# Patient Record
Sex: MALE | Race: WHITE | NOT HISPANIC OR LATINO | URBAN - METROPOLITAN AREA
[De-identification: names, ages, dates, MRNs, and addresses within clinical notes are randomized per-mention and may not be internally consistent; named-entity substitution may affect disease eponyms.]

---

## 2017-05-09 ENCOUNTER — OFFICE (OUTPATIENT)
Dept: URBAN - METROPOLITAN AREA CLINIC 64 | Facility: CLINIC | Age: 62
End: 2017-05-09

## 2017-05-09 VITALS
HEART RATE: 61 BPM | WEIGHT: 171 LBS | HEIGHT: 68 IN | DIASTOLIC BLOOD PRESSURE: 84 MMHG | SYSTOLIC BLOOD PRESSURE: 133 MMHG

## 2017-05-09 DIAGNOSIS — K51.00 ULCERATIVE (CHRONIC) PANCOLITIS WITHOUT COMPLICATIONS: ICD-10-CM

## 2017-05-09 LAB
C-REACTIVE PROTEIN, QUANT: 0.6 MG/L (ref 0–4.9)
CBC WITH DIFFERENTIAL/PLATELET: BASO (ABSOLUTE): 0 X10E3/UL (ref 0–0.2)
CBC WITH DIFFERENTIAL/PLATELET: BASOS: 0 %
CBC WITH DIFFERENTIAL/PLATELET: EOS (ABSOLUTE): 0.2 X10E3/UL (ref 0–0.4)
CBC WITH DIFFERENTIAL/PLATELET: EOS: 3 %
CBC WITH DIFFERENTIAL/PLATELET: HEMATOCRIT: 48.9 % (ref 37.5–51)
CBC WITH DIFFERENTIAL/PLATELET: HEMATOLOGY COMMENTS: (no result)
CBC WITH DIFFERENTIAL/PLATELET: HEMOGLOBIN: 16.7 G/DL (ref 12.6–17.7)
CBC WITH DIFFERENTIAL/PLATELET: IMMATURE CELLS: (no result)
CBC WITH DIFFERENTIAL/PLATELET: IMMATURE GRANS (ABS): 0 X10E3/UL (ref 0–0.1)
CBC WITH DIFFERENTIAL/PLATELET: IMMATURE GRANULOCYTES: 0 %
CBC WITH DIFFERENTIAL/PLATELET: LYMPHS (ABSOLUTE): 1.2 X10E3/UL (ref 0.7–3.1)
CBC WITH DIFFERENTIAL/PLATELET: LYMPHS: 19 %
CBC WITH DIFFERENTIAL/PLATELET: MCH: 32.2 PG (ref 26.6–33)
CBC WITH DIFFERENTIAL/PLATELET: MCHC: 34.2 G/DL (ref 31.5–35.7)
CBC WITH DIFFERENTIAL/PLATELET: MCV: 94 FL (ref 79–97)
CBC WITH DIFFERENTIAL/PLATELET: MONOCYTES(ABSOLUTE): 0.6 X10E3/UL (ref 0.1–0.9)
CBC WITH DIFFERENTIAL/PLATELET: MONOCYTES: 10 %
CBC WITH DIFFERENTIAL/PLATELET: NEUTROPHILS (ABSOLUTE): 4.2 X10E3/UL (ref 1.4–7)
CBC WITH DIFFERENTIAL/PLATELET: NEUTROPHILS: 68 %
CBC WITH DIFFERENTIAL/PLATELET: NRBC: (no result)
CBC WITH DIFFERENTIAL/PLATELET: PLATELETS: 212 X10E3/UL (ref 150–379)
CBC WITH DIFFERENTIAL/PLATELET: RBC: 5.19 X10E6/UL (ref 4.14–5.8)
CBC WITH DIFFERENTIAL/PLATELET: RDW: 13.5 % (ref 12.3–15.4)
CBC WITH DIFFERENTIAL/PLATELET: WBC: 6.2 X10E3/UL (ref 3.4–10.8)
COMP. METABOLIC PANEL (14): A/G RATIO: 1.7 (ref 1.2–2.2)
COMP. METABOLIC PANEL (14): ALBUMIN, SERUM: 4.5 G/DL (ref 3.6–4.8)
COMP. METABOLIC PANEL (14): ALKALINE PHOSPHATASE, S: 67 IU/L (ref 39–117)
COMP. METABOLIC PANEL (14): ALT (SGPT): 25 IU/L (ref 0–44)
COMP. METABOLIC PANEL (14): AST (SGOT): 29 IU/L (ref 0–40)
COMP. METABOLIC PANEL (14): BILIRUBIN, TOTAL: 0.5 MG/DL (ref 0–1.2)
COMP. METABOLIC PANEL (14): BUN/CREATININE RATIO: 21 (ref 10–24)
COMP. METABOLIC PANEL (14): BUN: 18 MG/DL (ref 8–27)
COMP. METABOLIC PANEL (14): CALCIUM, SERUM: 9.3 MG/DL (ref 8.6–10.2)
COMP. METABOLIC PANEL (14): CARBON DIOXIDE, TOTAL: 20 MMOL/L (ref 18–29)
COMP. METABOLIC PANEL (14): CHLORIDE, SERUM: 101 MMOL/L (ref 96–106)
COMP. METABOLIC PANEL (14): CREATININE, SERUM: 0.85 MG/DL (ref 0.76–1.27)
COMP. METABOLIC PANEL (14): EGFR IF AFRICN AM: 109 ML/MIN/1.73 (ref 59–?)
COMP. METABOLIC PANEL (14): EGFR IF NONAFRICN AM: 94 ML/MIN/1.73 (ref 59–?)
COMP. METABOLIC PANEL (14): GLOBULIN, TOTAL: 2.7 G/DL (ref 1.5–4.5)
COMP. METABOLIC PANEL (14): GLUCOSE, SERUM: 91 MG/DL (ref 65–99)
COMP. METABOLIC PANEL (14): POTASSIUM, SERUM: 4.3 MMOL/L (ref 3.5–5.2)
COMP. METABOLIC PANEL (14): PROTEIN, TOTAL, SERUM: 7.2 G/DL (ref 6–8.5)
COMP. METABOLIC PANEL (14): SODIUM, SERUM: 140 MMOL/L (ref 134–144)
SEDIMENTATION RATE-WESTERGREN: 6 MM/HR (ref 0–30)

## 2017-05-09 PROCEDURE — 99213 OFFICE O/P EST LOW 20 MIN: CPT | Performed by: NURSE PRACTITIONER

## 2017-05-09 RX ORDER — MESALAMINE 1.2 G/1
TABLET, DELAYED RELEASE ORAL
Qty: 360 | Refills: 3 | Status: ACTIVE

## 2017-05-09 RX ORDER — PREDNISONE 10 MG/1
TABLET ORAL
Qty: 100 | Refills: 1 | Status: COMPLETED
Start: 2017-05-09 | End: 2017-07-14

## 2017-07-14 ENCOUNTER — OFFICE (OUTPATIENT)
Dept: URBAN - METROPOLITAN AREA CLINIC 64 | Facility: CLINIC | Age: 62
End: 2017-07-14

## 2017-07-14 VITALS
SYSTOLIC BLOOD PRESSURE: 131 MMHG | HEIGHT: 68 IN | WEIGHT: 171 LBS | DIASTOLIC BLOOD PRESSURE: 81 MMHG | HEART RATE: 62 BPM

## 2017-07-14 DIAGNOSIS — K51.00 ULCERATIVE (CHRONIC) PANCOLITIS WITHOUT COMPLICATIONS: ICD-10-CM

## 2017-07-14 DIAGNOSIS — K62.5 HEMORRHAGE OF ANUS AND RECTUM: ICD-10-CM

## 2017-07-14 DIAGNOSIS — R19.7 DIARRHEA, UNSPECIFIED: ICD-10-CM

## 2017-07-14 PROCEDURE — 99213 OFFICE O/P EST LOW 20 MIN: CPT | Performed by: INTERNAL MEDICINE

## 2018-02-05 ENCOUNTER — OFFICE (OUTPATIENT)
Dept: URBAN - METROPOLITAN AREA CLINIC 64 | Facility: CLINIC | Age: 63
End: 2018-02-05

## 2018-02-05 VITALS
DIASTOLIC BLOOD PRESSURE: 68 MMHG | WEIGHT: 170 LBS | HEART RATE: 40 BPM | SYSTOLIC BLOOD PRESSURE: 128 MMHG | HEIGHT: 68 IN

## 2018-02-05 DIAGNOSIS — K51.00 ULCERATIVE (CHRONIC) PANCOLITIS WITHOUT COMPLICATIONS: ICD-10-CM

## 2018-02-05 PROCEDURE — 99213 OFFICE O/P EST LOW 20 MIN: CPT | Performed by: INTERNAL MEDICINE

## 2018-02-05 RX ORDER — BUDESONIDE 3 MG/1
9 CAPSULE ORAL
Qty: 90 | Refills: 1 | Status: COMPLETED
Start: 2018-01-26 | End: 2018-05-07

## 2018-05-07 ENCOUNTER — OFFICE (OUTPATIENT)
Dept: URBAN - METROPOLITAN AREA CLINIC 64 | Facility: CLINIC | Age: 63
End: 2018-05-07

## 2018-05-07 VITALS
SYSTOLIC BLOOD PRESSURE: 129 MMHG | WEIGHT: 165 LBS | HEART RATE: 59 BPM | HEIGHT: 68 IN | DIASTOLIC BLOOD PRESSURE: 73 MMHG

## 2018-05-07 DIAGNOSIS — K51.00 ULCERATIVE (CHRONIC) PANCOLITIS WITHOUT COMPLICATIONS: ICD-10-CM

## 2018-05-07 PROCEDURE — 99213 OFFICE O/P EST LOW 20 MIN: CPT | Performed by: INTERNAL MEDICINE

## 2018-10-29 ENCOUNTER — OFFICE (OUTPATIENT)
Dept: URBAN - METROPOLITAN AREA CLINIC 64 | Facility: CLINIC | Age: 63
End: 2018-10-29

## 2018-10-29 VITALS
HEART RATE: 95 BPM | DIASTOLIC BLOOD PRESSURE: 79 MMHG | SYSTOLIC BLOOD PRESSURE: 120 MMHG | WEIGHT: 152 LBS | HEIGHT: 68 IN

## 2018-10-29 DIAGNOSIS — R53.83 OTHER FATIGUE: ICD-10-CM

## 2018-10-29 DIAGNOSIS — K62.5 HEMORRHAGE OF ANUS AND RECTUM: ICD-10-CM

## 2018-10-29 DIAGNOSIS — R10.30 LOWER ABDOMINAL PAIN, UNSPECIFIED: ICD-10-CM

## 2018-10-29 DIAGNOSIS — R19.7 DIARRHEA, UNSPECIFIED: ICD-10-CM

## 2018-10-29 DIAGNOSIS — K51.30 ULCERATIVE (CHRONIC) RECTOSIGMOIDITIS WITHOUT COMPLICATIONS: ICD-10-CM

## 2018-10-29 DIAGNOSIS — R11.0 NAUSEA: ICD-10-CM

## 2018-10-29 PROCEDURE — 99214 OFFICE O/P EST MOD 30 MIN: CPT | Performed by: NURSE PRACTITIONER

## 2018-10-29 RX ORDER — DICYCLOMINE HYDROCHLORIDE 20 MG/1
40 TABLET ORAL
Qty: 60 | Refills: 11 | Status: COMPLETED
Start: 2018-10-29 | End: 2019-02-08

## 2018-10-29 RX ORDER — METRONIDAZOLE 500 MG/1
1500 TABLET, FILM COATED ORAL
Qty: 42 | Refills: 0 | Status: COMPLETED
Start: 2018-10-29 | End: 2018-11-29

## 2018-10-29 RX ORDER — ONDANSETRON HYDROCHLORIDE 4 MG/1
16 TABLET, ORALLY DISINTEGRATING ORAL
Qty: 60 | Refills: 0 | Status: COMPLETED
Start: 2018-10-29 | End: 2018-11-29

## 2018-11-29 ENCOUNTER — OFFICE (OUTPATIENT)
Dept: URBAN - METROPOLITAN AREA CLINIC 64 | Facility: CLINIC | Age: 63
End: 2018-11-29

## 2018-11-29 VITALS
HEART RATE: 97 BPM | WEIGHT: 141 LBS | HEIGHT: 68 IN | DIASTOLIC BLOOD PRESSURE: 76 MMHG | SYSTOLIC BLOOD PRESSURE: 117 MMHG

## 2018-11-29 DIAGNOSIS — R19.7 DIARRHEA, UNSPECIFIED: ICD-10-CM

## 2018-11-29 DIAGNOSIS — K51.30 ULCERATIVE (CHRONIC) RECTOSIGMOIDITIS WITHOUT COMPLICATIONS: ICD-10-CM

## 2018-11-29 DIAGNOSIS — A04.72 ENTEROCOLITIS DUE TO CLOSTRIDIUM DIFFICILE, NOT SPECIFIED AS: ICD-10-CM

## 2018-11-29 PROCEDURE — 99213 OFFICE O/P EST LOW 20 MIN: CPT | Performed by: INTERNAL MEDICINE

## 2018-11-29 RX ORDER — SACCHAROMYCES BOULARDII 50 MG
500 CAPSULE ORAL
Qty: 60 | Refills: 1 | Status: COMPLETED
Start: 2018-11-29 | End: 2019-02-08

## 2018-12-17 ENCOUNTER — CONVERSION ENCOUNTER (OUTPATIENT)
Dept: FAMILY MEDICINE CLINIC | Facility: CLINIC | Age: 63
End: 2018-12-17

## 2018-12-18 LAB
ALBUMIN SERPL-MCNC: 3.7 G/DL (ref 3.6–5.1)
ALP SERPL-CCNC: 72 U/L (ref 40–115)
ALT SERPL-CCNC: 16 U/L (ref 9–46)
AST SERPL-CCNC: 18 U/L (ref 10–35)
BASOPHILS # BLD AUTO: 40 CELLS/UL (ref 0–200)
BASOPHILS NFR BLD AUTO: 0.7 %
BILIRUB SERPL-MCNC: 0.4 MG/DL (ref 0.2–1.2)
BUN SERPL-MCNC: 12 MG/DL (ref 7–25)
BUN/CREAT SERPL: NORMAL (CALC) (ref 6–22)
CALCIUM SERPL-MCNC: 9.1 MG/DL (ref 8.6–10.3)
CHLORIDE SERPL-SCNC: 105 MMOL/L (ref 98–110)
CHOLEST SERPL-MCNC: 229 MG/DL
CHOLEST/HDLC SERPL: 5.6 (CALC)
CONV CO2: 31 MMOL/L (ref 20–32)
CONV TOTAL PROTEIN: 6.5 G/DL (ref 6.1–8.1)
CREAT UR-MCNC: 0.78 MG/DL (ref 0.7–1.25)
EOSINOPHIL # BLD AUTO: 262 CELLS/UL (ref 15–500)
EOSINOPHIL # BLD AUTO: 4.6 %
ERYTHROCYTE [DISTWIDTH] IN BLOOD BY AUTOMATED COUNT: 15.1 % (ref 11–15)
GLOBULIN UR ELPH-MCNC: 2.8 MG/DL (ref 1.9–3.7)
GLUCOSE UR QL: 75 MG/DL (ref 65–99)
HCT VFR BLD AUTO: 42.7 % (ref 38.5–50)
HDLC SERPL-MCNC: 41 MG/DL
HGB BLD-MCNC: 14.4 G/DL (ref 13.2–17.1)
INSULIN SERPL-ACNC: 1.3 (CALC) (ref 1–2.5)
LDLC SERPL CALC-MCNC: 166 MG/DL
LYMPHOCYTES # BLD AUTO: 906 CELLS/UL (ref 850–3900)
LYMPHOCYTES NFR BLD AUTO: 15.9 %
MCH RBC QN AUTO: 32 PG (ref 27–33)
MCHC RBC AUTO-ENTMCNC: 33.7 G/DL (ref 32–36)
MCV RBC AUTO: 94.9 FL (ref 80–100)
MONOCYTES # BLD AUTO: 462 CELLS/UL (ref 200–950)
MONOCYTES NFR BLD AUTO: 8.1 %
NEUTROPHILS # BLD AUTO: 4030 CELLS/UL (ref 1500–7800)
NEUTROPHILS NFR BLD AUTO: 70.7 %
NONHDLC SERPL-MCNC: 188 MG/DL
PLATELET # BLD AUTO: 248 10*3/UL (ref 140–400)
PMV BLD AUTO: 10.4 FL (ref 7.5–12.5)
POTASSIUM SERPL-SCNC: 4.5 MMOL/L (ref 3.5–5.3)
PSA SERPL-MCNC: 2.6 NG/ML
RBC # BLD AUTO: 4.5 MILLION/UL (ref 4.2–5.8)
SODIUM SERPL-SCNC: 141 MMOL/L (ref 135–146)
TRIGL SERPL-MCNC: 106 MG/DL
TSH SERPL-ACNC: 2.38 MIU/L (ref 0.4–4.5)
WBC # BLD AUTO: 5.7 10*3/UL (ref 3.8–10.8)

## 2019-02-08 ENCOUNTER — OFFICE (OUTPATIENT)
Dept: URBAN - METROPOLITAN AREA CLINIC 64 | Facility: CLINIC | Age: 64
End: 2019-02-08

## 2019-02-08 VITALS
WEIGHT: 161 LBS | SYSTOLIC BLOOD PRESSURE: 130 MMHG | DIASTOLIC BLOOD PRESSURE: 65 MMHG | HEART RATE: 39 BPM | HEIGHT: 68 IN

## 2019-02-08 DIAGNOSIS — K51.00 ULCERATIVE (CHRONIC) PANCOLITIS WITHOUT COMPLICATIONS: ICD-10-CM

## 2019-02-08 PROCEDURE — 99213 OFFICE O/P EST LOW 20 MIN: CPT | Performed by: INTERNAL MEDICINE

## 2019-04-03 ENCOUNTER — ON CAMPUS - OUTPATIENT (OUTPATIENT)
Dept: RURAL HOSPITAL 3 | Facility: HOSPITAL | Age: 64
End: 2019-04-03

## 2019-04-03 DIAGNOSIS — K51.90 ULCERATIVE COLITIS, UNSPECIFIED, WITHOUT COMPLICATIONS: ICD-10-CM

## 2019-04-03 DIAGNOSIS — K63.5 POLYP OF COLON: ICD-10-CM

## 2019-04-03 PROCEDURE — 45380 COLONOSCOPY AND BIOPSY: CPT | Mod: 33 | Performed by: INTERNAL MEDICINE

## 2019-08-01 ENCOUNTER — OFFICE (OUTPATIENT)
Dept: URBAN - METROPOLITAN AREA CLINIC 64 | Facility: CLINIC | Age: 64
End: 2019-08-01

## 2019-08-01 VITALS
HEIGHT: 68 IN | DIASTOLIC BLOOD PRESSURE: 85 MMHG | SYSTOLIC BLOOD PRESSURE: 131 MMHG | WEIGHT: 161 LBS | HEART RATE: 60 BPM

## 2019-08-01 DIAGNOSIS — K51.00 ULCERATIVE (CHRONIC) PANCOLITIS WITHOUT COMPLICATIONS: ICD-10-CM

## 2019-08-01 PROCEDURE — 99213 OFFICE O/P EST LOW 20 MIN: CPT | Performed by: INTERNAL MEDICINE

## 2019-12-18 PROBLEM — E66.3 OVERWEIGHT (BMI 25.0-29.9): Status: ACTIVE | Noted: 2019-12-18

## 2019-12-18 PROBLEM — E78.5 HYPERLIPIDEMIA: Status: ACTIVE | Noted: 2019-12-18

## 2019-12-18 PROBLEM — A04.72 C. DIFFICILE DIARRHEA: Status: ACTIVE | Noted: 2019-12-18

## 2019-12-18 PROBLEM — Z00.01 ANNUAL VISIT FOR GENERAL ADULT MEDICAL EXAMINATION WITH ABNORMAL FINDINGS: Status: ACTIVE | Noted: 2019-12-18

## 2019-12-18 PROBLEM — K51.20 CHRONIC ULCERATIVE PROCTITIS WITHOUT COMPLICATIONS (HCC): Status: ACTIVE | Noted: 2019-12-18

## 2019-12-18 PROBLEM — N41.0 PROSTATITIS, ACUTE: Status: ACTIVE | Noted: 2019-12-18

## 2019-12-18 PROBLEM — N52.9 ED (ERECTILE DYSFUNCTION): Status: ACTIVE | Noted: 2019-12-18

## 2019-12-18 PROBLEM — M15.9 GENERALIZED OSTEOARTHRITIS: Status: ACTIVE | Noted: 2019-12-18

## 2019-12-18 PROBLEM — Z12.11 COLON CANCER SCREENING: Status: ACTIVE | Noted: 2019-12-18

## 2019-12-18 PROBLEM — Z87.891 HISTORY OF TOBACCO USE: Status: ACTIVE | Noted: 2019-12-18

## 2019-12-18 PROBLEM — B35.1 ONYCHOMYCOSIS: Status: ACTIVE | Noted: 2019-12-18

## 2019-12-18 PROBLEM — Z12.5 SCREENING PSA (PROSTATE SPECIFIC ANTIGEN): Status: ACTIVE | Noted: 2019-12-18

## 2019-12-18 RX ORDER — MULTIVITAMIN/IRON/FOLIC ACID 18MG-0.4MG
1 TABLET ORAL DAILY
Status: ON HOLD | COMMUNITY
End: 2021-01-01

## 2019-12-18 RX ORDER — UREA 10 %
800 LOTION (ML) TOPICAL DAILY
COMMUNITY

## 2019-12-18 RX ORDER — VARDENAFIL HYDROCHLORIDE 20 MG/1
TABLET ORAL
COMMUNITY
Start: 2019-02-24 | End: 2020-06-10 | Stop reason: SDUPTHER

## 2019-12-18 RX ORDER — CRANBERRY FRUIT EXTRACT 200 MG
2 CAPSULE ORAL DAILY
COMMUNITY
End: 2021-01-01 | Stop reason: HOSPADM

## 2019-12-18 RX ORDER — DICYCLOMINE HCL 20 MG
TABLET ORAL
COMMUNITY
End: 2020-01-01

## 2019-12-18 RX ORDER — MESALAMINE 1.2 G/1
4800 TABLET, DELAYED RELEASE ORAL DAILY
COMMUNITY

## 2019-12-18 RX ORDER — BUDESONIDE 3 MG/1
1 CAPSULE, COATED PELLETS ORAL 3 TIMES DAILY
COMMUNITY
End: 2020-01-01

## 2019-12-18 NOTE — PROGRESS NOTES
Subjective   Gabriel Sandhu is a 64 y.o. male.     Chief Complaint   Patient presents with   • Annual Exam   • Hyperlipidemia       The patient is here: to discuss health maintenance and disease prevention to follow up on multiple medical problems.  Last comprehensive physical was on 12/19/2018.  Previous physical was performed by  Jose D Antunez MD  Overall has: moderate activity with work/home activities, exercises 2 - 3 times per week, good appetite, feels well with minor complaints, good energy level and is sleeping well. Nutrition: balanced diet and eating a variety of foods. Last tetanus shot was 11/12/2013. Patient's last colonoscopy was: 4/3/2019. Patient's last PSA was: 12/18/2019 2.5    Hyperlipidemia   This is a chronic problem. The current episode started more than 1 year ago. Recent lipid tests were reviewed and are high. Pertinent negatives include no chest pain or shortness of breath. Current antihyperlipidemic treatment includes diet change and exercise. There are no compliance problems.  Risk factors for coronary artery disease include dyslipidemia and male sex.        Recent Hospitalizations:  Recently treated at the following:  [facility]  for diarrhea 11/11/2019.  ccc      I personally reviewed and updated the patient's allergies, medications, problem list, and past medical, surgical, social, and family history.     Family History   Problem Relation Age of Onset   • Alzheimer's disease Father    • Dementia Father        Social History     Tobacco Use   • Smoking status: Former Smoker   • Smokeless tobacco: Never Used   Substance Use Topics   • Alcohol use: Yes     Frequency: Monthly or less     Comment: Drinks wine, Occasional alcohol use.   • Drug use: No       History reviewed. No pertinent surgical history.    Patient Active Problem List   Diagnosis   • Annual visit for general adult medical examination with abnormal findings   • Colon cancer screening   • Screening PSA (prostate  "specific antigen)   • C. 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   HENT: Negative for trouble swallowing and voice change.    Eyes: Negative for visual disturbance.   Respiratory: Negative for shortness of breath.    Cardiovascular: Negative for chest pain and palpitations.   Gastrointestinal: Negative for abdominal pain and nausea.   Endocrine: Negative for polydipsia and polyphagia.   Genitourinary: Negative for hematuria.   Musculoskeletal: Negative for neck stiffness.   Skin: Negative for color change and pallor.   Allergic/Immunologic: Negative for immunocompromised state.   Neurological: Negative for seizures and syncope.   Hematological: Negative for adenopathy.   Psychiatric/Behavioral: Negative for sleep disturbance and suicidal ideas.       Objective   /74   Pulse 98   Temp 98.5 °F (36.9 °C)   Resp 16   Ht 172.7 cm (68\")   Wt 77.2 kg (170 lb 3.2 oz)   SpO2 96%   BMI 25.88 kg/m²   Wt Readings from Last 3 Encounters:   12/20/19 77.2 kg (170 lb 3.2 oz)   12/19/18 67.5 kg (148 lb 12.8 oz)   11/19/18 64.7 kg (142 lb 9.6 oz)     Physical Exam   Constitutional: He is oriented to person, place, and time. He appears well-developed and well-nourished.   HENT:   Head: Normocephalic.   Right Ear: Tympanic membrane, external ear and ear canal normal.   Left Ear: Tympanic membrane, external ear and ear canal normal.   Nose: Nose normal.   Eyes: Pupils are equal, round, and reactive to light. Conjunctivae, EOM and lids are normal.   Neck: No JVD present. Carotid bruit is not present. No tracheal deviation present. No thyromegaly present.   Cardiovascular: Normal rate, regular rhythm, normal heart sounds and " intact distal pulses. Exam reveals no gallop and no friction rub.   No murmur heard.  Pulmonary/Chest: Effort normal and breath sounds normal. No stridor. He has no decreased breath sounds. He has no wheezes. He has no rales.   Abdominal: Soft. Bowel sounds are normal. He exhibits no distension and no mass. There is no tenderness. There is no rebound and no guarding. No hernia. Hernia confirmed negative in the right inguinal area and confirmed negative in the left inguinal area.   Genitourinary: Rectum normal, testes normal and penis normal. Rectal exam shows no mass, no tenderness and guaiac negative stool. Prostate is enlarged (mild, symmetric, no nodules). Prostate is not tender. Right testis shows no mass, no swelling and no tenderness. Left testis shows no mass, no swelling and no tenderness.   Lymphadenopathy:        Head (right side): No submental, no submandibular, no tonsillar, no preauricular, no posterior auricular and no occipital adenopathy present.        Head (left side): No submental, no submandibular, no tonsillar, no preauricular, no posterior auricular and no occipital adenopathy present.     He has no cervical adenopathy. No inguinal adenopathy noted on the right or left side.   Neurological: He is alert and oriented to person, place, and time. He has normal strength and normal reflexes. No cranial nerve deficit or sensory deficit. Coordination and gait normal.   Skin: Skin is warm and dry. Turgor is normal. He is not diaphoretic. No pallor.       Recent Lab Results:    Lab Results   Component Value Date    GLU 93 12/18/2019        Lab Results   Component Value Date    CHOL 229 (H) 12/17/2018    TRIG 139 12/18/2019    HDL 39 (L) 12/18/2019    VLDL 28 12/18/2019     LDL Cholesterol    Date Value Ref Range Status   12/18/2019 177 (H) 0 - 99 mg/dL Final   12/17/2018 166 (H) NR Final   12/15/2017 183 (H) <130 mg/dL Final   11/26/2016 146 (H) <130 Final     Lab Results   Component Value Date    PSA  2.5 12/18/2019    PSA 2.6 12/17/2018    PSA 1.4 12/15/2017     Lab Results   Component Value Date    WBC 5.5 12/18/2019    HGB 16.5 12/18/2019    HCT 47.8 12/18/2019    MCV 98 (H) 12/18/2019     12/18/2019     Lab Results   Component Value Date    TSH 2.620 12/18/2019     Lab Results   Component Value Date    GLUCOSE 116 (H) 11/11/2018    BUN 13 12/18/2019    CREATININE 1.13 12/18/2019    EGFRIFNONA 68 12/18/2019    EGFRIFAFRI 79 12/18/2019    BCR 12 12/18/2019    K 4.6 12/18/2019    CO2 26 12/18/2019    CALCIUM 9.7 12/18/2019    PROTENTOTREF 6.8 12/18/2019    ALBUMIN 4.7 12/18/2019    LABIL2 2.2 12/18/2019    AST 26 12/18/2019    ALT 21 12/18/2019         Age-appropriate Screening Schedule:  Refer to the list below for future screening recommendations based on patient's age, sex and/or medical conditions. Orders for these recommended tests are listed in the plan section. The patient has been provided with a written plan.    Health Maintenance   Topic Date Due   • ZOSTER VACCINE (1 of 2) 03/29/2013   • INFLUENZA VACCINE  08/01/2019   • LIPID PANEL  12/18/2020   • TDAP/TD VACCINES (2 - Td) 11/12/2023   • COLONOSCOPY  04/03/2029           Assessment/Plan       Physical.  Doing well, vaccines current.  He does not tolerate baby aspirin.  Discussed health maintenance, screening test, and lifestyle modification.  Hyperlipidemia.  Moderate elevation, overall improved on red yeast rice 2400 mg daily.  He is working hard on diet and exercise.  Recheck 1 year.  Colon cancer screening.  Colonoscopy current, followed by [facility].  Prostate cancer screening.  PSA/GABRIELLE benign.  Crohn's disease.  Overall stable on Rx per [facility].  Status post recent flare/C. difficile colitis.    Problem List Items Addressed This Visit        Cardiovascular and Mediastinum    Hyperlipidemia       Other    Annual visit for general adult medical examination with abnormal findings - Primary    Colon cancer screening    Screening PSA (prostate specific  antigen)    History of tobacco use    Overweight (BMI 25.0-29.9)              Expected course, medications, and adverse effects discussed.  Call or return if worsening or persistent symptoms.

## 2019-12-20 ENCOUNTER — OFFICE VISIT (OUTPATIENT)
Dept: FAMILY MEDICINE CLINIC | Facility: CLINIC | Age: 64
End: 2019-12-20

## 2019-12-20 VITALS
WEIGHT: 170.2 LBS | SYSTOLIC BLOOD PRESSURE: 132 MMHG | TEMPERATURE: 98.5 F | BODY MASS INDEX: 25.79 KG/M2 | OXYGEN SATURATION: 96 % | RESPIRATION RATE: 16 BRPM | HEART RATE: 98 BPM | DIASTOLIC BLOOD PRESSURE: 74 MMHG | HEIGHT: 68 IN

## 2019-12-20 DIAGNOSIS — Z12.5 SCREENING PSA (PROSTATE SPECIFIC ANTIGEN): ICD-10-CM

## 2019-12-20 DIAGNOSIS — Z12.11 COLON CANCER SCREENING: ICD-10-CM

## 2019-12-20 DIAGNOSIS — E78.2 MIXED HYPERLIPIDEMIA: ICD-10-CM

## 2019-12-20 DIAGNOSIS — Z00.01 ANNUAL VISIT FOR GENERAL ADULT MEDICAL EXAMINATION WITH ABNORMAL FINDINGS: Primary | ICD-10-CM

## 2019-12-20 DIAGNOSIS — Z87.891 HISTORY OF TOBACCO USE: ICD-10-CM

## 2019-12-20 DIAGNOSIS — E66.3 OVERWEIGHT (BMI 25.0-29.9): ICD-10-CM

## 2019-12-20 LAB
BILIRUB BLD-MCNC: NEGATIVE MG/DL
CLARITY, POC: CLEAR
COLOR UR: YELLOW
GLUCOSE UR STRIP-MCNC: NEGATIVE MG/DL
KETONES UR QL: NEGATIVE
LEUKOCYTE EST, POC: NEGATIVE
NITRITE UR-MCNC: NEGATIVE MG/ML
PH UR: 5 [PH] (ref 5–8)
PROT UR STRIP-MCNC: ABNORMAL MG/DL
RBC # UR STRIP: ABNORMAL /UL
SP GR UR: 1.01 (ref 1–1.03)
UROBILINOGEN UR QL: NORMAL

## 2019-12-20 PROCEDURE — 81003 URINALYSIS AUTO W/O SCOPE: CPT | Performed by: FAMILY MEDICINE

## 2019-12-20 PROCEDURE — 99396 PREV VISIT EST AGE 40-64: CPT | Performed by: FAMILY MEDICINE

## 2019-12-20 RX ORDER — AZATHIOPRINE 50 MG/1
75 TABLET ORAL DAILY
COMMUNITY
Start: 2019-11-12 | End: 2021-01-01 | Stop reason: HOSPADM

## 2020-01-01 ENCOUNTER — TELEPHONE (OUTPATIENT)
Dept: FAMILY MEDICINE CLINIC | Facility: CLINIC | Age: 65
End: 2020-01-01

## 2020-01-01 ENCOUNTER — OFFICE VISIT (OUTPATIENT)
Dept: FAMILY MEDICINE CLINIC | Facility: CLINIC | Age: 65
End: 2020-01-01

## 2020-01-01 VITALS
BODY MASS INDEX: 25.7 KG/M2 | WEIGHT: 169.6 LBS | OXYGEN SATURATION: 98 % | RESPIRATION RATE: 18 BRPM | HEIGHT: 68 IN | SYSTOLIC BLOOD PRESSURE: 129 MMHG | HEART RATE: 97 BPM | TEMPERATURE: 97.7 F | DIASTOLIC BLOOD PRESSURE: 84 MMHG

## 2020-01-01 DIAGNOSIS — Z00.00 WELCOME TO MEDICARE PREVENTIVE VISIT: Primary | ICD-10-CM

## 2020-01-01 DIAGNOSIS — R31.9 HEMATURIA, UNSPECIFIED TYPE: ICD-10-CM

## 2020-01-01 DIAGNOSIS — E66.3 OVERWEIGHT (BMI 25.0-29.9): ICD-10-CM

## 2020-01-01 DIAGNOSIS — Z87.891 HISTORY OF TOBACCO ABUSE: ICD-10-CM

## 2020-01-01 DIAGNOSIS — R07.9 CHEST PAIN, UNSPECIFIED TYPE: ICD-10-CM

## 2020-01-01 DIAGNOSIS — N52.9 ERECTILE DYSFUNCTION, UNSPECIFIED ERECTILE DYSFUNCTION TYPE: ICD-10-CM

## 2020-01-01 DIAGNOSIS — Z12.11 COLON CANCER SCREENING: ICD-10-CM

## 2020-01-01 DIAGNOSIS — Z23 NEED FOR PNEUMOCOCCAL VACCINE: ICD-10-CM

## 2020-01-01 DIAGNOSIS — Z00.00 WELCOME TO MEDICARE PREVENTIVE VISIT: ICD-10-CM

## 2020-01-01 DIAGNOSIS — E78.2 MIXED HYPERLIPIDEMIA: ICD-10-CM

## 2020-01-01 DIAGNOSIS — Z12.5 SCREENING PSA (PROSTATE SPECIFIC ANTIGEN): ICD-10-CM

## 2020-01-01 LAB
BACTERIA UR CULT: NO GROWTH
BACTERIA UR CULT: NORMAL
BILIRUB BLD-MCNC: NEGATIVE MG/DL
CLARITY, POC: CLEAR
COLOR UR: YELLOW
GLUCOSE UR STRIP-MCNC: NEGATIVE MG/DL
KETONES UR QL: NEGATIVE
LEUKOCYTE EST, POC: NEGATIVE
NITRITE UR-MCNC: NEGATIVE MG/ML
PH UR: 6.5 [PH] (ref 5–8)
PROT UR STRIP-MCNC: ABNORMAL MG/DL
PSA SERPL-MCNC: 2.6 NG/ML (ref 0–4)
RBC # UR STRIP: ABNORMAL /UL
SP GR UR: 1.01 (ref 1–1.03)
UROBILINOGEN UR QL: NORMAL

## 2020-01-01 PROCEDURE — 81003 URINALYSIS AUTO W/O SCOPE: CPT | Performed by: FAMILY MEDICINE

## 2020-01-01 PROCEDURE — G0009 ADMIN PNEUMOCOCCAL VACCINE: HCPCS | Performed by: FAMILY MEDICINE

## 2020-01-01 PROCEDURE — G0402 INITIAL PREVENTIVE EXAM: HCPCS | Performed by: FAMILY MEDICINE

## 2020-01-01 PROCEDURE — 90670 PCV13 VACCINE IM: CPT | Performed by: FAMILY MEDICINE

## 2020-01-01 PROCEDURE — 99213 OFFICE O/P EST LOW 20 MIN: CPT | Performed by: FAMILY MEDICINE

## 2020-01-01 PROCEDURE — 99497 ADVNCD CARE PLAN 30 MIN: CPT | Performed by: FAMILY MEDICINE

## 2020-01-01 RX ORDER — VARDENAFIL HYDROCHLORIDE 20 MG/1
20 TABLET ORAL DAILY PRN
Qty: 6 TABLET | Refills: 12 | Status: ON HOLD | OUTPATIENT
Start: 2020-01-01 | End: 2021-01-01 | Stop reason: SDUPTHER

## 2020-02-13 ENCOUNTER — OFFICE (OUTPATIENT)
Dept: URBAN - METROPOLITAN AREA CLINIC 64 | Facility: CLINIC | Age: 65
End: 2020-02-13

## 2020-02-13 VITALS
SYSTOLIC BLOOD PRESSURE: 127 MMHG | HEIGHT: 68 IN | DIASTOLIC BLOOD PRESSURE: 83 MMHG | HEART RATE: 67 BPM | WEIGHT: 173 LBS

## 2020-02-13 DIAGNOSIS — K51.00 ULCERATIVE (CHRONIC) PANCOLITIS WITHOUT COMPLICATIONS: ICD-10-CM

## 2020-02-13 DIAGNOSIS — R19.5 OTHER FECAL ABNORMALITIES: ICD-10-CM

## 2020-02-13 PROCEDURE — 99213 OFFICE O/P EST LOW 20 MIN: CPT | Performed by: INTERNAL MEDICINE

## 2020-02-13 RX ORDER — MESALAMINE 1.2 G/1
TABLET, DELAYED RELEASE ORAL
Qty: 360 | Refills: 3 | Status: ACTIVE

## 2020-02-13 RX ORDER — AZATHIOPRINE 50 MG/1
75 TABLET ORAL
Qty: 135 | Refills: 3 | Status: ACTIVE
Start: 2019-04-03

## 2020-06-10 ENCOUNTER — TELEPHONE (OUTPATIENT)
Dept: FAMILY MEDICINE CLINIC | Facility: CLINIC | Age: 65
End: 2020-06-10

## 2020-06-10 DIAGNOSIS — N52.9 ERECTILE DYSFUNCTION, UNSPECIFIED ERECTILE DYSFUNCTION TYPE: Primary | ICD-10-CM

## 2020-06-10 RX ORDER — VARDENAFIL HYDROCHLORIDE 20 MG/1
20 TABLET ORAL DAILY PRN
Qty: 6 TABLET | Refills: 2 | Status: SHIPPED | OUTPATIENT
Start: 2020-06-10 | End: 2020-07-27

## 2020-06-10 NOTE — TELEPHONE ENCOUNTER
Patient changed insurance company. Needs rx for Vardenafil 20mg sent to [facility]. He will want all future meds to also be sent to [facility] please.

## 2020-07-26 DIAGNOSIS — N52.9 ERECTILE DYSFUNCTION, UNSPECIFIED ERECTILE DYSFUNCTION TYPE: ICD-10-CM

## 2020-07-27 RX ORDER — VARDENAFIL HYDROCHLORIDE 20 MG/1
TABLET ORAL
Qty: 6 TABLET | Refills: 0 | Status: ON HOLD | OUTPATIENT
Start: 2020-07-27 | End: 2021-01-01

## 2020-08-27 ENCOUNTER — OFFICE (OUTPATIENT)
Dept: URBAN - METROPOLITAN AREA CLINIC 64 | Facility: CLINIC | Age: 65
End: 2020-08-27

## 2020-08-27 VITALS
HEART RATE: 69 BPM | SYSTOLIC BLOOD PRESSURE: 139 MMHG | DIASTOLIC BLOOD PRESSURE: 87 MMHG | WEIGHT: 170 LBS | HEIGHT: 68 IN

## 2020-08-27 DIAGNOSIS — K51.00 ULCERATIVE (CHRONIC) PANCOLITIS WITHOUT COMPLICATIONS: ICD-10-CM

## 2020-08-27 PROCEDURE — 99212 OFFICE O/P EST SF 10 MIN: CPT | Performed by: INTERNAL MEDICINE

## 2020-11-02 ENCOUNTER — OFFICE VISIT (OUTPATIENT)
Dept: FAMILY MEDICINE CLINIC | Facility: CLINIC | Age: 65
End: 2020-11-02

## 2020-11-02 ENCOUNTER — HOSPITAL ENCOUNTER (OUTPATIENT)
Dept: GENERAL RADIOLOGY | Facility: HOSPITAL | Age: 65
Discharge: HOME | End: 2020-11-02
Admitting: FAMILY MEDICINE

## 2020-11-02 VITALS
WEIGHT: 170.2 LBS | BODY MASS INDEX: 25.79 KG/M2 | RESPIRATION RATE: 16 BRPM | HEART RATE: 84 BPM | OXYGEN SATURATION: 98 % | TEMPERATURE: 97.7 F | DIASTOLIC BLOOD PRESSURE: 80 MMHG | SYSTOLIC BLOOD PRESSURE: 124 MMHG | HEIGHT: 68 IN

## 2020-11-02 DIAGNOSIS — R30.0 DYSURIA: Primary | ICD-10-CM

## 2020-11-02 DIAGNOSIS — M94.0 COSTAL CHONDRITIS: ICD-10-CM

## 2020-11-02 LAB
BILIRUB BLD-MCNC: NEGATIVE MG/DL
CLARITY, POC: CLEAR
COLOR UR: ABNORMAL
GLUCOSE UR STRIP-MCNC: NEGATIVE MG/DL
KETONES UR QL: NEGATIVE
LEUKOCYTE EST, POC: NEGATIVE
NITRITE UR-MCNC: NEGATIVE MG/ML
PH UR: 5 [PH] (ref 5–8)
PROT UR STRIP-MCNC: ABNORMAL MG/DL
RBC # UR STRIP: ABNORMAL /UL
SP GR UR: 1 (ref 1–1.03)
UROBILINOGEN UR QL: NORMAL

## 2020-11-02 PROCEDURE — 71046 X-RAY EXAM CHEST 2 VIEWS: CPT

## 2020-11-02 PROCEDURE — 81003 URINALYSIS AUTO W/O SCOPE: CPT | Performed by: FAMILY MEDICINE

## 2020-11-02 PROCEDURE — 99214 OFFICE O/P EST MOD 30 MIN: CPT | Performed by: FAMILY MEDICINE

## 2020-11-02 RX ORDER — SULFAMETHOXAZOLE AND TRIMETHOPRIM 800; 160 MG/1; MG/1
1 TABLET ORAL 2 TIMES DAILY
Qty: 20 TABLET | Refills: 0 | Status: SHIPPED | OUTPATIENT
Start: 2020-11-02 | End: 2020-01-01

## 2020-11-04 LAB
BACTERIA UR CULT: NO GROWTH
BACTERIA UR CULT: NORMAL

## 2020-11-13 ENCOUNTER — TELEPHONE (OUTPATIENT)
Dept: FAMILY MEDICINE CLINIC | Facility: CLINIC | Age: 65
End: 2020-11-13

## 2020-11-13 DIAGNOSIS — E78.2 MIXED HYPERLIPIDEMIA: Primary | ICD-10-CM

## 2020-11-13 DIAGNOSIS — N39.0 URINARY TRACT INFECTION WITH HEMATURIA, SITE UNSPECIFIED: Primary | ICD-10-CM

## 2020-11-13 DIAGNOSIS — R31.9 URINARY TRACT INFECTION WITH HEMATURIA, SITE UNSPECIFIED: Primary | ICD-10-CM

## 2020-11-13 DIAGNOSIS — R53.83 MALAISE AND FATIGUE: ICD-10-CM

## 2020-11-13 DIAGNOSIS — R53.81 MALAISE AND FATIGUE: ICD-10-CM

## 2020-11-13 RX ORDER — CIPROFLOXACIN 500 MG/1
500 TABLET, FILM COATED ORAL 2 TIMES DAILY
Qty: 30 TABLET | Refills: 0 | Status: SHIPPED | OUTPATIENT
Start: 2020-11-13 | End: 2020-11-23

## 2020-11-13 NOTE — TELEPHONE ENCOUNTER
Gabriel called and said he was seen for uti. Took all of antibiotic. It was improving but came right back after finished antibiotic. He is asking for another round?

## 2020-11-13 NOTE — TELEPHONE ENCOUNTER
Okay to send ciprofloxacin 500 mg twice daily for 15 days, but schedule him back in here in 3 weeks to recheck we will repeat a UA on him at that time to ensure his prostate infection is clearing up, thanks

## 2020-11-24 LAB
ALBUMIN SERPL-MCNC: 4.3 G/DL (ref 3.8–4.8)
ALBUMIN/GLOB SERPL: 1.7 {RATIO} (ref 1.2–2.2)
ALP SERPL-CCNC: 63 IU/L (ref 39–117)
ALT SERPL-CCNC: 18 IU/L (ref 0–44)
AST SERPL-CCNC: 27 IU/L (ref 0–40)
BASOPHILS # BLD AUTO: 0 X10E3/UL (ref 0–0.2)
BASOPHILS NFR BLD AUTO: 1 %
BILIRUB SERPL-MCNC: 0.4 MG/DL (ref 0–1.2)
BUN SERPL-MCNC: 15 MG/DL (ref 8–27)
BUN/CREAT SERPL: 15 (ref 10–24)
CALCIUM SERPL-MCNC: 9.2 MG/DL (ref 8.6–10.2)
CHLORIDE SERPL-SCNC: 104 MMOL/L (ref 96–106)
CHOLEST SERPL-MCNC: 209 MG/DL (ref 100–199)
CHOLEST/HDLC SERPL: 5.6 RATIO (ref 0–5)
CO2 SERPL-SCNC: 26 MMOL/L (ref 20–29)
CREAT SERPL-MCNC: 0.98 MG/DL (ref 0.76–1.27)
EOSINOPHIL # BLD AUTO: 0.1 X10E3/UL (ref 0–0.4)
EOSINOPHIL NFR BLD AUTO: 2 %
ERYTHROCYTE [DISTWIDTH] IN BLOOD BY AUTOMATED COUNT: 13.1 % (ref 11.6–15.4)
GLOBULIN SER CALC-MCNC: 2.6 G/DL (ref 1.5–4.5)
GLUCOSE SERPL-MCNC: 88 MG/DL (ref 65–99)
HCT VFR BLD AUTO: 47.9 % (ref 37.5–51)
HDLC SERPL-MCNC: 37 MG/DL
HGB BLD-MCNC: 16 G/DL (ref 13–17.7)
IMM GRANULOCYTES # BLD AUTO: 0 X10E3/UL (ref 0–0.1)
IMM GRANULOCYTES NFR BLD AUTO: 0 %
LDLC SERPL CALC-MCNC: 146 MG/DL (ref 0–99)
LYMPHOCYTES # BLD AUTO: 0.8 X10E3/UL (ref 0.7–3.1)
LYMPHOCYTES NFR BLD AUTO: 18 %
MCH RBC QN AUTO: 33.6 PG (ref 26.6–33)
MCHC RBC AUTO-ENTMCNC: 33.4 G/DL (ref 31.5–35.7)
MCV RBC AUTO: 101 FL (ref 79–97)
MONOCYTES # BLD AUTO: 0.5 X10E3/UL (ref 0.1–0.9)
MONOCYTES NFR BLD AUTO: 11 %
NEUTROPHILS # BLD AUTO: 3 X10E3/UL (ref 1.4–7)
NEUTROPHILS NFR BLD AUTO: 68 %
PLATELET # BLD AUTO: 188 X10E3/UL (ref 150–450)
POTASSIUM SERPL-SCNC: 4.5 MMOL/L (ref 3.5–5.2)
PROT SERPL-MCNC: 6.9 G/DL (ref 6–8.5)
RBC # BLD AUTO: 4.76 X10E6/UL (ref 4.14–5.8)
SODIUM SERPL-SCNC: 141 MMOL/L (ref 134–144)
TRIGL SERPL-MCNC: 144 MG/DL (ref 0–149)
TSH SERPL DL<=0.005 MIU/L-ACNC: 2.61 UIU/ML (ref 0.45–4.5)
VLDLC SERPL CALC-MCNC: 26 MG/DL (ref 5–40)
WBC # BLD AUTO: 4.4 X10E3/UL (ref 3.4–10.8)

## 2020-12-23 PROBLEM — Z00.00 WELCOME TO MEDICARE PREVENTIVE VISIT: Status: ACTIVE | Noted: 2020-01-01

## 2020-12-23 NOTE — PROGRESS NOTES
Subjective   Gabriel Sandhu is a 65 y.o. male.     Chief Complaint   Patient presents with   • Welcome To Medicare   • Hyperlipidemia       The patient is here: to discuss health maintenance and disease prevention to follow up on multiple medical problems.  Last comprehensive physical was on 2019.  Previous physical was performed by  Jose D Antunez MD  Overall has: moderate activity with work/home activities, good appetite, feels well with no complaints, good energy level and is sleeping well. Nutrition: balanced diet and eating a variety of foods. Last tetanus shot was 2013. Patient's last colonoscopy was: 4/3/2019. Patients last PSA was 2.5 on 2019.    Hyperlipidemia  This is a chronic problem. The current episode started more than 1 year ago. Recent lipid tests were reviewed and are high. Pertinent negatives include no chest pain or shortness of breath. Current antihyperlipidemic treatment includes diet change and exercise. There are no compliance problems.  Risk factors for coronary artery disease include dyslipidemia and male sex.        Recent Hospitalizations:  No hospitalization(s) within the last year..  ccc    I personally reviewed and updated the patient's allergies, medications, problem list, and past medical, surgical, social, and family history. I have reviewed and confirmed the accuracy of the HPI and ROS as documented by the MA/LPN/RN Jose D Antunez MD      Family History   Problem Relation Age of Onset   • Alzheimer's disease Father    • Dementia Father    • No Known Problems Mother    • No Known Problems Sister    • No Known Problems Brother    • No Known Problems Daughter    • No Known Problems Sister    • No Known Problems Brother    • No Known Problems Brother        Social History     Tobacco Use   • Smoking status: Former Smoker     Packs/day: 2.50     Types: Cigarettes     Start date:      Quit date:      Years since quittin.9   • Smokeless tobacco: Never Used    Substance Use Topics   • Alcohol use: Yes     Frequency: Monthly or less     Comment: Drinks wine, Occasional alcohol use.   • Drug use: No       History reviewed. No pertinent surgical history.    Patient Active Problem List   Diagnosis   • Annual visit for general adult medical examination with abnormal findings   • Colon cancer screening   • Screening PSA (prostate specific antigen)   • C. 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2 g EC tablet, Take  by mouth Daily., Disp: , Rfl:   •  Multiple Vitamins-Minerals (CENTRUM ADULTS) tablet, Take  by mouth Daily., Disp: , Rfl:   •  Red Yeast Rice Extract 600 MG capsule, Take 2 capsules by mouth Daily., Disp: , Rfl:   •  vardenafil (LEVITRA) 20 MG tablet, TAKE 1 TABLET BY MOUTH ONCE DAILY TAKE  1  HOUR  PRIOR  TO  SEXUAL  ACTIVITY  (NO  MORE  THAN  ONCE  A  DAY), Disp: 6 tablet, Rfl: 0         Review of Systems   Constitutional: Negative for chills and diaphoresis.   HENT: Negative for trouble swallowing and voice change.    Eyes: Negative for visual disturbance.   Respiratory: Negative for shortness of breath.    Cardiovascular: Negative for chest pain and palpitations.   Gastrointestinal: Negative for abdominal pain and nausea.   Endocrine: Negative for polydipsia and polyphagia.   Genitourinary: Negative for hematuria.   Musculoskeletal:  "Negative for neck stiffness.   Skin: Negative for color change and pallor.   Allergic/Immunologic: Negative for immunocompromised state.   Neurological: Negative for seizures and syncope.   Hematological: Negative for adenopathy.   Psychiatric/Behavioral: Negative for hallucinations, sleep disturbance and suicidal ideas.       I have reviewed and confirmed the accuracy of the ROS as documented by the MA/LPN/RN Jose D Antunez MD      Objective   /84 (BP Location: Right arm, Patient Position: Sitting, Cuff Size: Adult)   Pulse 97   Temp 97.7 °F (36.5 °C)   Resp 18   Ht 172.7 cm (68\")   Wt 76.9 kg (169 lb 9.6 oz)   SpO2 98%   BMI 25.79 kg/m²   BP Readings from Last 3 Encounters:   12/23/20 129/84   11/02/20 124/80   12/20/19 132/74     Wt Readings from Last 3 Encounters:   12/23/20 76.9 kg (169 lb 9.6 oz)   11/02/20 77.2 kg (170 lb 3.2 oz)   12/20/19 77.2 kg (170 lb 3.2 oz)     Physical Exam    Recent Lab Results:    No results found for: HGBA1C  Lab Results   Component Value Date    GLU 88 11/23/2020     Lab Results   Component Value Date     (H) 11/23/2020     (H) 12/18/2019     (H) 12/17/2018     Lab Results   Component Value Date    CHOL 229 (H) 12/17/2018    CHOL 247 (H) 12/15/2017    CHOL 203 (H) 11/26/2016     Lab Results   Component Value Date    TRIG 144 11/23/2020    TRIG 139 12/18/2019    TRIG 106 12/17/2018     Lab Results   Component Value Date    HDL 37 (L) 11/23/2020    HDL 39 (L) 12/18/2019    HDL 41 12/17/2018     Lab Results   Component Value Date    PSA 2.5 12/18/2019    PSA 2.6 12/17/2018    PSA 1.4 12/15/2017     Lab Results   Component Value Date    WBC 4.4 11/23/2020    HGB 16.0 11/23/2020    HCT 47.9 11/23/2020     (H) 11/23/2020     11/23/2020     Lab Results   Component Value Date    TSH 2.610 11/23/2020      Lab Results   Component Value Date    GLUCOSE 116 (H) 11/11/2018    BUN 15 11/23/2020    CREATININE 0.98 11/23/2020    EGFRIFNONA 81 " 11/23/2020    EGFRIFAFRI 93 11/23/2020    BCR 15 11/23/2020    K 4.5 11/23/2020    CO2 26 11/23/2020    CALCIUM 9.2 11/23/2020    PROTENTOTREF 6.9 11/23/2020    ALBUMIN 4.3 11/23/2020    LABIL2 1.7 11/23/2020    AST 27 11/23/2020    ALT 18 11/23/2020     No results found for: GALE, RF, SEDRATE   No results found for: CRP   No results found for: IRON, TIBC, FERRITIN   No results found for: [id number]       Age-appropriate Screening Schedule:  Refer to the list below for future screening recommendations based on patient's age, sex and/or medical conditions. Orders for these recommended tests are listed in the plan section. The patient has been provided with a written plan.    Health Maintenance   Topic Date Due   • ZOSTER VACCINE (2 of 2) 12/02/2020   • LIPID PANEL  11/23/2021   • TDAP/TD VACCINES (2 - Td) 11/12/2023   • COLONOSCOPY  04/03/2029   • INFLUENZA VACCINE  Completed       Depression Screen:   PHQ-2/PHQ-9 Depression Screening 12/23/2020   Little interest or pleasure in doing things 0   Feeling down, depressed, or hopeless 0   Trouble falling or staying asleep, or sleeping too much 0   Feeling tired or having little energy 0   Poor appetite or overeating 0   Feeling bad about yourself - or that you are a failure or have let yourself or your family down 0   Trouble concentrating on things, such as reading the newspaper or watching television 0   Moving or speaking so slowly that other people could have noticed. Or the opposite - being so fidgety or restless that you have been moving around a lot more than usual 0   Thoughts that you would be better off dead, or of hurting yourself in some way 0   Total Score 0   If you checked off any problems, how difficult have these problems made it for you to do your work, take care of things at home, or get along with other people? Not difficult at all     I spent more than 16 minutes asking patient questions, counseling and documenting in the chart.    Health Habits and  Functional and Cognitive Screening:  Functional & Cognitive Status 12/23/2020   Do you have difficulty preparing food and eating? No   Do you have difficulty bathing yourself, getting dressed or grooming yourself? No   Do you have difficulty using the toilet? No   Do you have difficulty moving around from place to place? No   Do you have trouble with steps or getting out of a bed or a chair? No   Current Diet Well Balanced Diet   Dental Exam Up to date   Eye Exam Up to date   Exercise (times per week) 5 times per week   Current Exercises Include Yard Work   Do you need help using the phone?  No   Are you deaf or do you have serious difficulty hearing?  No   Do you need help with transportation? No   Do you need help shopping? No   Do you need help preparing meals?  No   Do you need help with housework?  No   Do you need help with laundry? No   Do you need help taking your medications? No   Do you need help managing money? No   Do you ever drive or ride in a car without wearing a seat belt? No   Have you felt unusual stress, anger or loneliness in the last month? No   Who do you live with? Spouse   If you need help, do you have trouble finding someone available to you? No   Have you been bothered in the last four weeks by sexual problems? No   Do you have difficulty concentrating, remembering or making decisions? No       Does the patient have evidence of cognitive impairment? No    Advance Care Planning:  ACP discussion was held with the patient during this visit. Patient does not have an advance directive, information provided.     A face-to-face visit was completed today with patient.  Counseling explanation, and discussion of advanced directives was performed.   The last advanced care visit was performed in 2019.  In a near life ending situation, from which he is not expected to recover functionally, and he is not able to speak for him, he does not want life sustaining measures. We discussed feeding tubes,  ventilators and cardiac support as well as dialysis.    I spent more than 16 minutes discussing Advanced Care Planning information and documenting in the chart.    Identification of Risk Factors:  Risk factors include: Abdominal Aortic Aneurysm Screening  Advance Directive Discussion  Cardiovascular risk  Chronic Pain   Colon Cancer Screening  Fall Risk  Immunizations Discussed/Encouraged (specific immunizations; up to date )  Lung Cancer Risk  Prostate Cancer Screening .    Compared to one year ago, the patient feels his physical health is the same.  Compared to one year ago, the patient feels his mental health is the same.    Patient Self-Management and Personalized Health Advice  The patient has been provided with information about: diet, exercise, prevention of cardiac or vascular disease, fall prevention, designing advance directives and supplements and preventive services including:   · Annual Wellness Visit (AWV)  · Cardiovascular Disease Screening Tests (may do this order every 5 years in beneficiaries without signs or symptoms of cardiovascular disease)  · Colorectal Cancer Screening, Colonoscopy  · Colorectal Cancer Screening, Cologuard Test   · Depression Screening (15 minutes face to face, Code )  · Influenza Vaccine and Administration  · Pneumococcal Vaccine and Administration  · Prostate Cancer Screening .      Assessment/Plan      Medications        Problem List         LOS    Funmilayo to Medicare.  Doing well, vaccines updated.  He does not tolerate baby aspirin.  Discussed health maintenance, screening test, and lifestyle modification.  EKG benign today, AAA screening ultrasound scheduled.  Hyperlipidemia.    Improved, mild elevation, overall improved on red yeast rice 2400 mg daily.  He is working hard on diet and exercise.  Recheck 1 year.  Colon cancer screening.  Colonoscopy current, followed by [facility].  Prostate cancer screening.  Check PSA.  Crohn's disease.  Overall stable on Rx per [facility].   Status post recent flare/C. difficile colitis.  Hematuria.  Improved.  Likely secondary to recent prostatitis status post Rx.  Culture sent.  Follow-up recheck.  Consider further eval if persistent symptoms.  Updated          Problem List Items Addressed This Visit          Diagnoses and all orders for this visit:    1. Welcome to Medicare preventive visit (Primary)  -     POC Urinalysis Dipstick, Automated    2. Mixed hyperlipidemia    3. Colon cancer screening    4. Screening PSA (prostate specific antigen)    5. Overweight (BMI 25.0-29.9)    6. Hematuria, unspecified type  -     Urine Culture - Urine, Urine, Clean Catch              Expected course, medications, and adverse effects discussed.  Call or return if worsening or persistent symptoms.  I wore protective equipment throughout this patient encounter including a mask, gloves, and eye protection.  Hand hygiene was performed before donning protective equipment and after removal when leaving the room. The complete contents of the Assessment and Plan as documented above have been reviewed and addressed by myself with the patient today as part of an ongoing evaluation / treatment plan.  If some of the documentation has been copied from a previous note and is unchanged it indicates that this problem / plan has been assessed today but is stable from a previous visit and no changes have been recommended.

## 2020-12-29 NOTE — TELEPHONE ENCOUNTER
----- Message from Jose D Antunez MD sent at 12/29/2020  7:54 AM EST -----  Let him know his PSA blood test is normal, thanks

## 2021-01-01 ENCOUNTER — ANESTHESIA EVENT (OUTPATIENT)
Dept: PERIOP | Facility: HOSPITAL | Age: 66
End: 2021-01-01

## 2021-01-01 ENCOUNTER — OFFICE VISIT (OUTPATIENT)
Dept: FAMILY MEDICINE CLINIC | Facility: CLINIC | Age: 66
End: 2021-01-01

## 2021-01-01 ENCOUNTER — READMISSION MANAGEMENT (OUTPATIENT)
Dept: CALL CENTER | Facility: HOSPITAL | Age: 66
End: 2021-01-01

## 2021-01-01 ENCOUNTER — APPOINTMENT (OUTPATIENT)
Dept: ULTRASOUND IMAGING | Facility: HOSPITAL | Age: 66
End: 2021-01-01

## 2021-01-01 ENCOUNTER — APPOINTMENT (OUTPATIENT)
Dept: CT IMAGING | Facility: HOSPITAL | Age: 66
End: 2021-01-01

## 2021-01-01 ENCOUNTER — APPOINTMENT (OUTPATIENT)
Dept: GENERAL RADIOLOGY | Facility: HOSPITAL | Age: 66
End: 2021-01-01

## 2021-01-01 ENCOUNTER — TRANSITIONAL CARE MANAGEMENT TELEPHONE ENCOUNTER (OUTPATIENT)
Dept: CALL CENTER | Facility: HOSPITAL | Age: 66
End: 2021-01-01

## 2021-01-01 ENCOUNTER — HOSPITAL ENCOUNTER (INPATIENT)
Facility: HOSPITAL | Age: 66
LOS: 2 days | Discharge: HOME | End: 2021-11-10
Attending: HOSPITALIST | Admitting: HOSPITALIST

## 2021-01-01 ENCOUNTER — INPATIENT HOSPITAL (OUTPATIENT)
Dept: URBAN - METROPOLITAN AREA HOSPITAL 84 | Facility: HOSPITAL | Age: 66
End: 2021-01-01

## 2021-01-01 ENCOUNTER — HOSPITAL ENCOUNTER (OUTPATIENT)
Dept: ULTRASOUND IMAGING | Facility: HOSPITAL | Age: 66
Discharge: HOME | End: 2021-01-11
Admitting: FAMILY MEDICINE

## 2021-01-01 ENCOUNTER — ANESTHESIA (OUTPATIENT)
Dept: PERIOP | Facility: HOSPITAL | Age: 66
End: 2021-01-01

## 2021-01-01 ENCOUNTER — TELEPHONE (OUTPATIENT)
Dept: FAMILY MEDICINE CLINIC | Facility: CLINIC | Age: 66
End: 2021-01-01

## 2021-01-01 ENCOUNTER — APPOINTMENT (OUTPATIENT)
Dept: OTHER | Facility: HOSPITAL | Age: 66
End: 2021-01-01

## 2021-01-01 ENCOUNTER — OFFICE (OUTPATIENT)
Dept: URBAN - METROPOLITAN AREA CLINIC 64 | Facility: CLINIC | Age: 66
End: 2021-01-01

## 2021-01-01 ENCOUNTER — HOSPITAL ENCOUNTER (INPATIENT)
Facility: HOSPITAL | Age: 66
LOS: 2 days | End: 2021-12-20
Attending: EMERGENCY MEDICINE | Admitting: INTERNAL MEDICINE

## 2021-01-01 ENCOUNTER — HOSPITAL ENCOUNTER (INPATIENT)
Facility: HOSPITAL | Age: 66
LOS: 3 days | Discharge: HOME | End: 2021-11-28
Attending: EMERGENCY MEDICINE | Admitting: HOSPITALIST

## 2021-01-01 ENCOUNTER — APPOINTMENT (OUTPATIENT)
Dept: CARDIOLOGY | Facility: HOSPITAL | Age: 66
End: 2021-01-01

## 2021-01-01 ENCOUNTER — ON CAMPUS - OUTPATIENT (OUTPATIENT)
Dept: RURAL HOSPITAL 3 | Facility: HOSPITAL | Age: 66
End: 2021-01-01

## 2021-01-01 ENCOUNTER — TELEPHONE (OUTPATIENT)
Dept: ONCOLOGY | Facility: CLINIC | Age: 66
End: 2021-01-01

## 2021-01-01 ENCOUNTER — HOSPITAL ENCOUNTER (OUTPATIENT)
Dept: CT IMAGING | Facility: HOSPITAL | Age: 66
Discharge: HOME | End: 2021-04-30
Admitting: FAMILY MEDICINE

## 2021-01-01 VITALS
HEART RATE: 73 BPM | TEMPERATURE: 98.1 F | SYSTOLIC BLOOD PRESSURE: 107 MMHG | WEIGHT: 139 LBS | OXYGEN SATURATION: 96 % | DIASTOLIC BLOOD PRESSURE: 69 MMHG | RESPIRATION RATE: 16 BRPM | HEIGHT: 69 IN | BODY MASS INDEX: 20.59 KG/M2

## 2021-01-01 VITALS
SYSTOLIC BLOOD PRESSURE: 98 MMHG | TEMPERATURE: 98.2 F | OXYGEN SATURATION: 98 % | DIASTOLIC BLOOD PRESSURE: 70 MMHG | HEIGHT: 68 IN | RESPIRATION RATE: 18 BRPM | WEIGHT: 138.2 LBS | HEART RATE: 113 BPM | BODY MASS INDEX: 20.95 KG/M2

## 2021-01-01 VITALS
HEIGHT: 68 IN | SYSTOLIC BLOOD PRESSURE: 125 MMHG | WEIGHT: 172 LBS | HEART RATE: 65 BPM | DIASTOLIC BLOOD PRESSURE: 80 MMHG

## 2021-01-01 VITALS — RESPIRATION RATE: 24 BRPM | WEIGHT: 130 LBS | TEMPERATURE: 100.2 F | BODY MASS INDEX: 19.7 KG/M2 | HEIGHT: 68 IN

## 2021-01-01 VITALS
SYSTOLIC BLOOD PRESSURE: 113 MMHG | HEART RATE: 80 BPM | WEIGHT: 155.16 LBS | TEMPERATURE: 98.7 F | RESPIRATION RATE: 16 BRPM | HEIGHT: 68 IN | BODY MASS INDEX: 23.52 KG/M2 | DIASTOLIC BLOOD PRESSURE: 70 MMHG | OXYGEN SATURATION: 94 %

## 2021-01-01 VITALS
BODY MASS INDEX: 26.1 KG/M2 | HEART RATE: 76 BPM | DIASTOLIC BLOOD PRESSURE: 80 MMHG | TEMPERATURE: 97.5 F | SYSTOLIC BLOOD PRESSURE: 120 MMHG | WEIGHT: 172.2 LBS | HEIGHT: 68 IN | RESPIRATION RATE: 18 BRPM | OXYGEN SATURATION: 98 %

## 2021-01-01 VITALS
HEART RATE: 102 BPM | SYSTOLIC BLOOD PRESSURE: 92 MMHG | HEIGHT: 68 IN | DIASTOLIC BLOOD PRESSURE: 68 MMHG | WEIGHT: 135 LBS

## 2021-01-01 DIAGNOSIS — E44.0 MODERATE MALNUTRITION (HCC): ICD-10-CM

## 2021-01-01 DIAGNOSIS — K56.609 BOWEL OBSTRUCTION (HCC): ICD-10-CM

## 2021-01-01 DIAGNOSIS — K51.018 ULCERATIVE PANCOLITIS WITH OTHER COMPLICATION (HCC): Chronic | ICD-10-CM

## 2021-01-01 DIAGNOSIS — K51.00 ULCERATIVE (CHRONIC) PANCOLITIS WITHOUT COMPLICATIONS: ICD-10-CM

## 2021-01-01 DIAGNOSIS — A04.72 C. DIFFICILE COLITIS: Primary | ICD-10-CM

## 2021-01-01 DIAGNOSIS — R63.4 ABNORMAL WEIGHT LOSS: ICD-10-CM

## 2021-01-01 DIAGNOSIS — R19.8 PERFORATED VISCUS: Primary | ICD-10-CM

## 2021-01-01 DIAGNOSIS — R10.12 LEFT UPPER QUADRANT PAIN: ICD-10-CM

## 2021-01-01 DIAGNOSIS — Z87.891 HISTORY OF TOBACCO USE: ICD-10-CM

## 2021-01-01 DIAGNOSIS — E87.1 HYPO-OSMOLALITY AND HYPONATREMIA: ICD-10-CM

## 2021-01-01 DIAGNOSIS — K51.018 ULCERATIVE (CHRONIC) PANCOLITIS WITH OTHER COMPLICATION: ICD-10-CM

## 2021-01-01 DIAGNOSIS — D72.825 BANDEMIA: ICD-10-CM

## 2021-01-01 DIAGNOSIS — R10.32 LEFT LOWER QUADRANT PAIN: ICD-10-CM

## 2021-01-01 DIAGNOSIS — D12.5 BENIGN NEOPLASM OF SIGMOID COLON: ICD-10-CM

## 2021-01-01 DIAGNOSIS — E87.1 HYPONATREMIA: ICD-10-CM

## 2021-01-01 DIAGNOSIS — R10.9 ABDOMINAL PAIN, UNSPECIFIED ABDOMINAL LOCATION: Primary | ICD-10-CM

## 2021-01-01 DIAGNOSIS — K64.1 SECOND DEGREE HEMORRHOIDS: ICD-10-CM

## 2021-01-01 DIAGNOSIS — A41.9 SEPTIC SHOCK (HCC): ICD-10-CM

## 2021-01-01 DIAGNOSIS — K51.011 ULCERATIVE (CHRONIC) PANCOLITIS WITH RECTAL BLEEDING: ICD-10-CM

## 2021-01-01 DIAGNOSIS — E87.1 HYPONATREMIA: Primary | ICD-10-CM

## 2021-01-01 DIAGNOSIS — Z09 FOLLOW UP: ICD-10-CM

## 2021-01-01 DIAGNOSIS — R30.0 DYSURIA: ICD-10-CM

## 2021-01-01 DIAGNOSIS — E66.3 OVERWEIGHT (BMI 25.0-29.9): ICD-10-CM

## 2021-01-01 DIAGNOSIS — D12.2 BENIGN NEOPLASM OF ASCENDING COLON: ICD-10-CM

## 2021-01-01 DIAGNOSIS — R10.9 ABDOMINAL PAIN, UNSPECIFIED ABDOMINAL LOCATION: ICD-10-CM

## 2021-01-01 DIAGNOSIS — R19.7 DIARRHEA, UNSPECIFIED: ICD-10-CM

## 2021-01-01 DIAGNOSIS — R65.21 SEPTIC SHOCK (HCC): ICD-10-CM

## 2021-01-01 DIAGNOSIS — A04.72 ENTEROCOLITIS DUE TO CLOSTRIDIUM DIFFICILE, NOT SPECIFIED AS: ICD-10-CM

## 2021-01-01 DIAGNOSIS — R63.4 WEIGHT LOSS: ICD-10-CM

## 2021-01-01 DIAGNOSIS — A09 DIARRHEA OF INFECTIOUS ORIGIN: Primary | ICD-10-CM

## 2021-01-01 DIAGNOSIS — R06.6 INTRACTABLE HICCUPS: ICD-10-CM

## 2021-01-01 DIAGNOSIS — K63.1 PERFORATED BOWEL (HCC): ICD-10-CM

## 2021-01-01 DIAGNOSIS — Z87.891 HISTORY OF TOBACCO ABUSE: ICD-10-CM

## 2021-01-01 DIAGNOSIS — R31.0 GROSS HEMATURIA: Primary | ICD-10-CM

## 2021-01-01 LAB
ABO GROUP BLD: NORMAL
ABO GROUP BLD: NORMAL
ADV 40+41 DNA STL QL NAA+NON-PROBE: NOT DETECTED
ADV 40+41 DNA STL QL NAA+NON-PROBE: NOT DETECTED
ALBUMIN SERPL-MCNC: 0.6 G/DL (ref 3.5–5.2)
ALBUMIN SERPL-MCNC: 1.6 G/DL (ref 3.5–5.2)
ALBUMIN SERPL-MCNC: 1.7 G/DL (ref 3.5–5.2)
ALBUMIN SERPL-MCNC: 1.8 G/DL (ref 3.5–5.2)
ALBUMIN SERPL-MCNC: 1.9 G/DL (ref 3.5–5.2)
ALBUMIN SERPL-MCNC: 2 G/DL (ref 3.5–5.2)
ALBUMIN SERPL-MCNC: 2.5 G/DL (ref 3.5–5.2)
ALBUMIN SERPL-MCNC: 2.8 G/DL (ref 3.5–5.2)
ALBUMIN SERPL-MCNC: 2.8 G/DL (ref 3.5–5.2)
ALBUMIN SERPL-MCNC: 2.9 G/DL (ref 3.8–4.8)
ALBUMIN SERPL-MCNC: 3.1 G/DL (ref 3.5–5.2)
ALBUMIN/GLOB SERPL: 0.3 G/DL
ALBUMIN/GLOB SERPL: 0.4 G/DL
ALBUMIN/GLOB SERPL: 0.7 G/DL
ALBUMIN/GLOB SERPL: 0.8 G/DL
ALBUMIN/GLOB SERPL: 0.8 G/DL
ALBUMIN/GLOB SERPL: 1 G/DL
ALBUMIN/GLOB SERPL: 1 {RATIO} (ref 1.2–2.2)
ALBUMIN/GLOB SERPL: 2 G/DL
ALBUMIN/GLOB SERPL: 2.1 G/DL
ALBUMIN/GLOB SERPL: 2.1 G/DL
ALBUMIN/GLOB SERPL: 4 G/DL
ALP SERPL-CCNC: 112 IU/L (ref 44–121)
ALP SERPL-CCNC: 130 U/L (ref 39–117)
ALP SERPL-CCNC: 141 U/L (ref 39–117)
ALP SERPL-CCNC: 407 U/L (ref 39–117)
ALP SERPL-CCNC: 419 U/L (ref 39–117)
ALP SERPL-CCNC: 55 U/L (ref 39–117)
ALP SERPL-CCNC: 617 U/L (ref 39–117)
ALP SERPL-CCNC: 65 U/L (ref 39–117)
ALP SERPL-CCNC: 69 U/L (ref 39–117)
ALP SERPL-CCNC: 87 U/L (ref 39–117)
ALP SERPL-CCNC: 91 U/L (ref 39–117)
ALT SERPL W P-5'-P-CCNC: 1305 U/L (ref 1–41)
ALT SERPL W P-5'-P-CCNC: 15 U/L (ref 1–41)
ALT SERPL W P-5'-P-CCNC: 17 U/L (ref 1–41)
ALT SERPL W P-5'-P-CCNC: 1855 U/L (ref 1–41)
ALT SERPL W P-5'-P-CCNC: 19 U/L (ref 1–41)
ALT SERPL W P-5'-P-CCNC: 2275 U/L (ref 1–41)
ALT SERPL W P-5'-P-CCNC: 26 U/L (ref 1–41)
ALT SERPL W P-5'-P-CCNC: 2687 U/L (ref 1–41)
ALT SERPL W P-5'-P-CCNC: 28 U/L (ref 1–41)
ALT SERPL W P-5'-P-CCNC: 35 U/L (ref 1–41)
ALT SERPL-CCNC: 41 IU/L (ref 0–44)
ANION GAP SERPL CALCULATED.3IONS-SCNC: 11 MMOL/L (ref 5–15)
ANION GAP SERPL CALCULATED.3IONS-SCNC: 11 MMOL/L (ref 5–15)
ANION GAP SERPL CALCULATED.3IONS-SCNC: 13 MMOL/L (ref 5–15)
ANION GAP SERPL CALCULATED.3IONS-SCNC: 13 MMOL/L (ref 5–15)
ANION GAP SERPL CALCULATED.3IONS-SCNC: 14 MMOL/L (ref 5–15)
ANION GAP SERPL CALCULATED.3IONS-SCNC: 14 MMOL/L (ref 5–15)
ANION GAP SERPL CALCULATED.3IONS-SCNC: 16 MMOL/L (ref 5–15)
ANION GAP SERPL CALCULATED.3IONS-SCNC: 17 MMOL/L (ref 5–15)
ANION GAP SERPL CALCULATED.3IONS-SCNC: 20 MMOL/L (ref 5–15)
ANION GAP SERPL CALCULATED.3IONS-SCNC: 20 MMOL/L (ref 5–15)
ANION GAP SERPL CALCULATED.3IONS-SCNC: 23 MMOL/L (ref 5–15)
ANION GAP SERPL CALCULATED.3IONS-SCNC: 30 MMOL/L (ref 5–15)
ANION GAP SERPL CALCULATED.3IONS-SCNC: 34 MMOL/L (ref 5–15)
ANISOCYTOSIS BLD QL: ABNORMAL
APTT PPP: 109.6 SECONDS (ref 24–31)
APTT PPP: 56.1 SECONDS (ref 24–31)
ARTERIAL PATENCY WRIST A: ABNORMAL
ARTERIAL PATENCY WRIST A: POSITIVE
ARTERIAL PATENCY WRIST A: POSITIVE
AST SERPL-CCNC: 14 U/L (ref 1–40)
AST SERPL-CCNC: 14 U/L (ref 1–40)
AST SERPL-CCNC: 24 U/L (ref 1–40)
AST SERPL-CCNC: 25 U/L (ref 1–40)
AST SERPL-CCNC: 31 U/L (ref 1–40)
AST SERPL-CCNC: 33 U/L (ref 1–40)
AST SERPL-CCNC: 34 IU/L (ref 0–40)
AST SERPL-CCNC: 6065 U/L (ref 1–40)
AST SERPL-CCNC: >7000 U/L (ref 1–40)
ASTRO TYP 1-8 RNA STL QL NAA+NON-PROBE: NOT DETECTED
ASTRO TYP 1-8 RNA STL QL NAA+NON-PROBE: NOT DETECTED
ATMOSPHERIC PRESS: ABNORMAL MM[HG]
BACTERIA FLD CULT: ABNORMAL
BACTERIA SPEC AEROBE CULT: NORMAL
BACTERIA SPEC AEROBE CULT: NORMAL
BACTERIA UR CULT: NO GROWTH
BACTERIA UR CULT: NORMAL
BACTERIA UR QL AUTO: ABNORMAL /HPF
BASE DEFICIT: ABNORMAL
BASE DEFICIT: ABNORMAL
BASE EXCESS BLDA CALC-SCNC: -10 MMOL/L (ref 0–3)
BASE EXCESS BLDA CALC-SCNC: -17 MMOL/L (ref 0–3)
BASE EXCESS BLDA CALC-SCNC: -6.4 MMOL/L (ref 0–3)
BASE EXCESS BLDA CALC-SCNC: -6.5 MMOL/L (ref 0–3)
BASE EXCESS BLDA CALC-SCNC: -8 MMOL/L (ref 0–3)
BASE EXCESS BLDA CALC-SCNC: -8.9 MMOL/L (ref 0–3)
BASE EXCESS BLDA CALC-SCNC: <0 MMOL/L (ref 0–3)
BASE EXCESS BLDA CALC-SCNC: <0 MMOL/L (ref 0–3)
BASOPHILS # BLD AUTO: 0 10*3/MM3 (ref 0–0.2)
BASOPHILS # BLD AUTO: 0 X10E3/UL (ref 0–0.2)
BASOPHILS NFR BLD AUTO: 0 %
BASOPHILS NFR BLD AUTO: 0.1 % (ref 0–1.5)
BASOPHILS NFR BLD AUTO: 0.2 % (ref 0–1.5)
BDY SITE: ABNORMAL
BH BB BLOOD EXPIRATION DATE: NORMAL
BH BB BLOOD EXPIRATION DATE: NORMAL
BH BB BLOOD TYPE BARCODE: 6200
BH BB BLOOD TYPE BARCODE: 9500
BH BB DISPENSE STATUS: NORMAL
BH BB DISPENSE STATUS: NORMAL
BH BB PRODUCT CODE: NORMAL
BH BB PRODUCT CODE: NORMAL
BH BB UNIT NUMBER: NORMAL
BH BB UNIT NUMBER: NORMAL
BH CV ECHO MEAS - ACS: 1.5 CM
BH CV ECHO MEAS - AO MAX PG (FULL): 4.5 MMHG
BH CV ECHO MEAS - AO MAX PG: 7.8 MMHG
BH CV ECHO MEAS - AO MEAN PG (FULL): 2 MMHG
BH CV ECHO MEAS - AO MEAN PG: 3.7 MMHG
BH CV ECHO MEAS - AO ROOT AREA (BSA CORRECTED): 1.7
BH CV ECHO MEAS - AO ROOT AREA: 6.8 CM^2
BH CV ECHO MEAS - AO ROOT DIAM: 3 CM
BH CV ECHO MEAS - AO V2 MAX: 139.8 CM/SEC
BH CV ECHO MEAS - AO V2 MEAN: 85.9 CM/SEC
BH CV ECHO MEAS - AO V2 VTI: 18.6 CM
BH CV ECHO MEAS - AORTIC HR: 133.6 BPM
BH CV ECHO MEAS - AORTIC R-R: 0.45 SEC
BH CV ECHO MEAS - ASC AORTA: 2.8 CM
BH CV ECHO MEAS - AVA(I,A): 2 CM^2
BH CV ECHO MEAS - AVA(I,D): 2 CM^2
BH CV ECHO MEAS - AVA(V,A): 1.8 CM^2
BH CV ECHO MEAS - AVA(V,D): 1.8 CM^2
BH CV ECHO MEAS - BSA(HAYCOCK): 1.7 M^2
BH CV ECHO MEAS - BSA: 1.7 M^2
BH CV ECHO MEAS - BZI_BMI: 19.8 KILOGRAMS/M^2
BH CV ECHO MEAS - BZI_METRIC_HEIGHT: 172.7 CM
BH CV ECHO MEAS - BZI_METRIC_WEIGHT: 59 KG
BH CV ECHO MEAS - CI(AO): 10 L/MIN/M^2
BH CV ECHO MEAS - CI(LVOT): 2.9 L/MIN/M^2
BH CV ECHO MEAS - CO(AO): 17 L/MIN
BH CV ECHO MEAS - CO(LVOT): 4.9 L/MIN
BH CV ECHO MEAS - EDV(CUBED): 118.3 ML
BH CV ECHO MEAS - EDV(MOD-SP4): 101.5 ML
BH CV ECHO MEAS - EDV(TEICH): 113.3 ML
BH CV ECHO MEAS - EF(CUBED): 54.7 %
BH CV ECHO MEAS - EF(MOD-BP): 43 %
BH CV ECHO MEAS - EF(MOD-SP4): 42.5 %
BH CV ECHO MEAS - EF(TEICH): 46.3 %
BH CV ECHO MEAS - ESV(CUBED): 53.6 ML
BH CV ECHO MEAS - ESV(MOD-SP4): 58.4 ML
BH CV ECHO MEAS - ESV(TEICH): 60.8 ML
BH CV ECHO MEAS - FS: 23.2 %
BH CV ECHO MEAS - IVS/LVPW: 0.84
BH CV ECHO MEAS - IVSD: 0.8 CM
BH CV ECHO MEAS - LA DIMENSION(2D): 2.8 CM
BH CV ECHO MEAS - LV DIASTOLIC VOL/BSA (35-75): 59.6 ML/M^2
BH CV ECHO MEAS - LV MASS(C)D: 147.9 GRAMS
BH CV ECHO MEAS - LV MASS(C)DI: 86.9 GRAMS/M^2
BH CV ECHO MEAS - LV MAX PG: 3.3 MMHG
BH CV ECHO MEAS - LV MEAN PG: 1.7 MMHG
BH CV ECHO MEAS - LV SYSTOLIC VOL/BSA (12-30): 34.3 ML/M^2
BH CV ECHO MEAS - LV V1 MAX: 90.6 CM/SEC
BH CV ECHO MEAS - LV V1 MEAN: 60.3 CM/SEC
BH CV ECHO MEAS - LV V1 VTI: 13.5 CM
BH CV ECHO MEAS - LVIDD: 4.9 CM
BH CV ECHO MEAS - LVIDS: 3.8 CM
BH CV ECHO MEAS - LVOT AREA: 2.7 CM^2
BH CV ECHO MEAS - LVOT DIAM: 1.9 CM
BH CV ECHO MEAS - LVPWD: 0.95 CM
BH CV ECHO MEAS - MR MAX PG: 32 MMHG
BH CV ECHO MEAS - MR MAX VEL: 282.7 CM/SEC
BH CV ECHO MEAS - MV A MAX VEL: 85.1 CM/SEC
BH CV ECHO MEAS - MV DEC SLOPE: 1160 CM/SEC^2
BH CV ECHO MEAS - MV DEC TIME: 0.09 SEC
BH CV ECHO MEAS - MV E MAX VEL: 102 CM/SEC
BH CV ECHO MEAS - MV E/A: 1.2
BH CV ECHO MEAS - MV MAX PG: 3.3 MMHG
BH CV ECHO MEAS - MV MEAN PG: 2 MMHG
BH CV ECHO MEAS - MV V2 MAX: 91.1 CM/SEC
BH CV ECHO MEAS - MV V2 MEAN: 69.1 CM/SEC
BH CV ECHO MEAS - MV V2 VTI: 13.4 CM
BH CV ECHO MEAS - MVA(VTI): 2.7 CM^2
BH CV ECHO MEAS - PA ACC TIME: 0.08 SEC
BH CV ECHO MEAS - PA MAX PG (FULL): 5.8 MMHG
BH CV ECHO MEAS - PA MAX PG: 7.8 MMHG
BH CV ECHO MEAS - PA MEAN PG (FULL): 2.6 MMHG
BH CV ECHO MEAS - PA MEAN PG: 3.7 MMHG
BH CV ECHO MEAS - PA PR(ACCEL): 44.7 MMHG
BH CV ECHO MEAS - PA V2 MAX: 139.8 CM/SEC
BH CV ECHO MEAS - PA V2 MEAN: 87.2 CM/SEC
BH CV ECHO MEAS - PA V2 VTI: 19.6 CM
BH CV ECHO MEAS - PVA(I,A): 2.4 CM^2
BH CV ECHO MEAS - PVA(I,D): 2.4 CM^2
BH CV ECHO MEAS - PVA(V,A): 2.3 CM^2
BH CV ECHO MEAS - PVA(V,D): 2.3 CM^2
BH CV ECHO MEAS - QP/QS: 1.3
BH CV ECHO MEAS - RAP SYSTOLE: 3 MMHG
BH CV ECHO MEAS - RV MAX PG: 2 MMHG
BH CV ECHO MEAS - RV MEAN PG: 1 MMHG
BH CV ECHO MEAS - RV V1 MAX: 71.5 CM/SEC
BH CV ECHO MEAS - RV V1 MEAN: 47 CM/SEC
BH CV ECHO MEAS - RV V1 VTI: 10.4 CM
BH CV ECHO MEAS - RVDD: 3.3 CM
BH CV ECHO MEAS - RVOT AREA: 4.5 CM^2
BH CV ECHO MEAS - RVOT DIAM: 2.4 CM
BH CV ECHO MEAS - RVSP: 38.8 MMHG
BH CV ECHO MEAS - SI(AO): 75 ML/M^2
BH CV ECHO MEAS - SI(CUBED): 38 ML/M^2
BH CV ECHO MEAS - SI(LVOT): 21.6 ML/M^2
BH CV ECHO MEAS - SI(MOD-SP4): 25.3 ML/M^2
BH CV ECHO MEAS - SI(TEICH): 30.8 ML/M^2
BH CV ECHO MEAS - SV(AO): 127.6 ML
BH CV ECHO MEAS - SV(CUBED): 64.7 ML
BH CV ECHO MEAS - SV(LVOT): 36.8 ML
BH CV ECHO MEAS - SV(MOD-SP4): 43.1 ML
BH CV ECHO MEAS - SV(RVOT): 47.1 ML
BH CV ECHO MEAS - SV(TEICH): 52.5 ML
BH CV ECHO MEAS - TR MAX VEL: 299 CM/SEC
BILIRUB BLD-MCNC: NEGATIVE MG/DL
BILIRUB SERPL-MCNC: 0.3 MG/DL (ref 0–1.2)
BILIRUB SERPL-MCNC: 0.3 MG/DL (ref 0–1.2)
BILIRUB SERPL-MCNC: 0.4 MG/DL (ref 0–1.2)
BILIRUB SERPL-MCNC: 0.4 MG/DL (ref 0–1.2)
BILIRUB SERPL-MCNC: 0.5 MG/DL (ref 0–1.2)
BILIRUB SERPL-MCNC: 0.6 MG/DL (ref 0–1.2)
BILIRUB SERPL-MCNC: 0.7 MG/DL (ref 0–1.2)
BILIRUB SERPL-MCNC: 0.9 MG/DL (ref 0–1.2)
BILIRUB SERPL-MCNC: 1.8 MG/DL (ref 0–1.2)
BILIRUB SERPL-MCNC: 2.1 MG/DL (ref 0–1.2)
BILIRUB SERPL-MCNC: 2.9 MG/DL (ref 0–1.2)
BILIRUB UR QL STRIP: NEGATIVE
BLD GP AB SCN SERPL QL: NEGATIVE
BLD GP AB SCN SERPL QL: NEGATIVE
BUN SERPL-MCNC: 11 MG/DL (ref 8–23)
BUN SERPL-MCNC: 11 MG/DL (ref 8–23)
BUN SERPL-MCNC: 16 MG/DL (ref 8–27)
BUN SERPL-MCNC: 17 MG/DL (ref 8–23)
BUN SERPL-MCNC: 33 MG/DL (ref 8–23)
BUN SERPL-MCNC: 36 MG/DL (ref 8–23)
BUN SERPL-MCNC: 36 MG/DL (ref 8–23)
BUN SERPL-MCNC: 37 MG/DL (ref 8–23)
BUN SERPL-MCNC: 45 MG/DL (ref 8–23)
BUN SERPL-MCNC: 46 MG/DL (ref 8–23)
BUN SERPL-MCNC: 5 MG/DL (ref 8–23)
BUN SERPL-MCNC: 5 MG/DL (ref 8–23)
BUN SERPL-MCNC: 7 MG/DL (ref 8–23)
BUN SERPL-MCNC: 8 MG/DL (ref 8–23)
BUN/CREAT SERPL: 10.4 (ref 7–25)
BUN/CREAT SERPL: 11.5 (ref 7–25)
BUN/CREAT SERPL: 12.1 (ref 7–25)
BUN/CREAT SERPL: 16.1 (ref 7–25)
BUN/CREAT SERPL: 16.2 (ref 7–25)
BUN/CREAT SERPL: 17 (ref 7–25)
BUN/CREAT SERPL: 21 (ref 10–24)
BUN/CREAT SERPL: 22.5 (ref 7–25)
BUN/CREAT SERPL: 25.5 (ref 7–25)
BUN/CREAT SERPL: 30.8 (ref 7–25)
BUN/CREAT SERPL: 31.9 (ref 7–25)
BUN/CREAT SERPL: 34.9 (ref 7–25)
BUN/CREAT SERPL: 6.3 (ref 7–25)
BUN/CREAT SERPL: 6.5 (ref 7–25)
BURR CELLS BLD QL SMEAR: ABNORMAL
C CAYETANENSIS DNA STL QL NAA+NON-PROBE: NOT DETECTED
C CAYETANENSIS DNA STL QL NAA+NON-PROBE: NOT DETECTED
C COLI+JEJ+UPSA DNA STL QL NAA+NON-PROBE: NOT DETECTED
C COLI+JEJ+UPSA DNA STL QL NAA+NON-PROBE: NOT DETECTED
C DIFF GDH STL QL: NEGATIVE
C DIFF GDH STL QL: NORMAL
C DIFF TOX GENS STL QL NAA+PROBE: DETECTED
CA-I BLDA-SCNC: 0.9 MMOL/L (ref 1.12–1.32)
CA-I BLDA-SCNC: 0.92 MMOL/L (ref 1.15–1.33)
CA-I BLDA-SCNC: 1.09 MMOL/L (ref 1.12–1.32)
CA-I SERPL ISE-MCNC: 0.66 MMOL/L (ref 1.2–1.3)
CA-I SERPL ISE-MCNC: 0.74 MMOL/L (ref 1.2–1.3)
CA-I SERPL ISE-MCNC: 0.76 MMOL/L (ref 1.2–1.3)
CA-I SERPL ISE-MCNC: 0.86 MMOL/L (ref 1.2–1.3)
CA-I SERPL ISE-MCNC: 1.07 MMOL/L (ref 1.2–1.3)
CALCIUM SERPL-MCNC: 8.8 MG/DL (ref 8.6–10.2)
CALCIUM SPEC-SCNC: 4.8 MG/DL (ref 8.6–10.5)
CALCIUM SPEC-SCNC: 5.1 MG/DL (ref 8.6–10.5)
CALCIUM SPEC-SCNC: 5.5 MG/DL (ref 8.6–10.5)
CALCIUM SPEC-SCNC: 5.8 MG/DL (ref 8.6–10.5)
CALCIUM SPEC-SCNC: 6.5 MG/DL (ref 8.6–10.5)
CALCIUM SPEC-SCNC: 6.5 MG/DL (ref 8.6–10.5)
CALCIUM SPEC-SCNC: 7.1 MG/DL (ref 8.6–10.5)
CALCIUM SPEC-SCNC: 7.8 MG/DL (ref 8.6–10.5)
CALCIUM SPEC-SCNC: 8 MG/DL (ref 8.6–10.5)
CALCIUM SPEC-SCNC: 8.3 MG/DL (ref 8.6–10.5)
CALCIUM SPEC-SCNC: 8.6 MG/DL (ref 8.6–10.5)
CALPROTECTIN STL-MCNT: 2148 UG/G (ref 0–120)
CHLORIDE SERPL-SCNC: 100 MMOL/L (ref 98–107)
CHLORIDE SERPL-SCNC: 101 MMOL/L (ref 98–107)
CHLORIDE SERPL-SCNC: 104 MMOL/L (ref 98–107)
CHLORIDE SERPL-SCNC: 107 MMOL/L (ref 98–107)
CHLORIDE SERPL-SCNC: 85 MMOL/L (ref 98–107)
CHLORIDE SERPL-SCNC: 89 MMOL/L (ref 98–107)
CHLORIDE SERPL-SCNC: 90 MMOL/L (ref 96–106)
CHLORIDE SERPL-SCNC: 93 MMOL/L (ref 98–107)
CHLORIDE SERPL-SCNC: 94 MMOL/L (ref 98–107)
CHLORIDE SERPL-SCNC: 95 MMOL/L (ref 98–107)
CHLORIDE SERPL-SCNC: 98 MMOL/L (ref 98–107)
CHLORIDE SERPL-SCNC: 99 MMOL/L (ref 98–107)
CLARITY UR: CLEAR
CLARITY, POC: CLEAR
CO2 BLDA-SCNC: 17 MMOL/L (ref 23–27)
CO2 BLDA-SCNC: 18.1 MMOL/L (ref 22–29)
CO2 BLDA-SCNC: 18.4 MMOL/L (ref 22–29)
CO2 BLDA-SCNC: 18.4 MMOL/L (ref 22–29)
CO2 BLDA-SCNC: 18.6 MMOL/L (ref 22–29)
CO2 BLDA-SCNC: 20.9 MMOL/L (ref 22–29)
CO2 BLDA-SCNC: 21 MMOL/L (ref 23–27)
CO2 BLDA-SCNC: 9.2 MMOL/L (ref 22–29)
CO2 SERPL-SCNC: 11 MMOL/L (ref 22–29)
CO2 SERPL-SCNC: 16 MMOL/L (ref 22–29)
CO2 SERPL-SCNC: 17 MMOL/L (ref 22–29)
CO2 SERPL-SCNC: 17 MMOL/L (ref 22–29)
CO2 SERPL-SCNC: 20 MMOL/L (ref 20–29)
CO2 SERPL-SCNC: 20 MMOL/L (ref 22–29)
CO2 SERPL-SCNC: 22 MMOL/L (ref 22–29)
CO2 SERPL-SCNC: 23 MMOL/L (ref 22–29)
CO2 SERPL-SCNC: 24 MMOL/L (ref 22–29)
CO2 SERPL-SCNC: 25 MMOL/L (ref 22–29)
CO2 SERPL-SCNC: 9 MMOL/L (ref 22–29)
COLOR UR: ABNORMAL
COLOR UR: YELLOW
CREAT SERPL-MCNC: 0.61 MG/DL (ref 0.76–1.27)
CREAT SERPL-MCNC: 0.68 MG/DL (ref 0.76–1.27)
CREAT SERPL-MCNC: 0.77 MG/DL (ref 0.76–1.27)
CREAT SERPL-MCNC: 0.8 MG/DL (ref 0.76–1.27)
CREAT SERPL-MCNC: 0.91 MG/DL (ref 0.76–1.27)
CREAT SERPL-MCNC: 1 MG/DL (ref 0.76–1.27)
CREAT SERPL-MCNC: 1.07 MG/DL (ref 0.76–1.27)
CREAT SERPL-MCNC: 1.29 MG/DL (ref 0.76–1.27)
CREAT SERPL-MCNC: 1.41 MG/DL (ref 0.76–1.27)
CREAT SERPL-MCNC: 1.44 MG/DL (ref 0.76–1.27)
CREAT SERPL-MCNC: 1.6 MG/DL (ref 0.76–1.27)
CREAT SERPL-MCNC: 2.3 MG/DL (ref 0.76–1.27)
CROSSMATCH INTERPRETATION: NORMAL
CRP SERPL-MCNC: 10.01 MG/DL (ref 0–0.5)
CRP SERPL-MCNC: 20.78 MG/DL (ref 0–0.5)
CRP SERPL-MCNC: 3.75 MG/DL (ref 0–0.5)
CRYPTOSP DNA STL QL NAA+NON-PROBE: NOT DETECTED
CRYPTOSP DNA STL QL NAA+NON-PROBE: NOT DETECTED
D DIMER PPP FEU-MCNC: 10.28 MG/L (FEU) (ref 0–0.59)
D-LACTATE SERPL-SCNC: 0.9 MMOL/L (ref 0.5–2)
D-LACTATE SERPL-SCNC: 10 MMOL/L (ref 0.5–2)
D-LACTATE SERPL-SCNC: 13 MMOL/L (ref 0.5–2)
D-LACTATE SERPL-SCNC: 14.3 MMOL/L (ref 0.5–2)
D-LACTATE SERPL-SCNC: 19.5 MMOL/L (ref 0.5–2)
D-LACTATE SERPL-SCNC: 19.9 MMOL/L (ref 0.5–2)
D-LACTATE SERPL-SCNC: 20.1 MMOL/L (ref 0.5–2)
D-LACTATE SERPL-SCNC: 20.1 MMOL/L (ref 0.5–2)
D-LACTATE SERPL-SCNC: 20.7 MMOL/L (ref 0.5–2)
D-LACTATE SERPL-SCNC: 5 MMOL/L (ref 0.5–2)
D-LACTATE SERPL-SCNC: 7 MMOL/L (ref 0.5–2)
DEPRECATED RDW RBC AUTO: 48.1 FL (ref 37–54)
DEPRECATED RDW RBC AUTO: 48.6 FL (ref 37–54)
DEPRECATED RDW RBC AUTO: 49.4 FL (ref 37–54)
DEPRECATED RDW RBC AUTO: 49.9 FL (ref 37–54)
DEPRECATED RDW RBC AUTO: 49.9 FL (ref 37–54)
DEPRECATED RDW RBC AUTO: 50.8 FL (ref 37–54)
DEPRECATED RDW RBC AUTO: 51.6 FL (ref 37–54)
DEPRECATED RDW RBC AUTO: 52.1 FL (ref 37–54)
DEPRECATED RDW RBC AUTO: 52.1 FL (ref 37–54)
DEPRECATED RDW RBC AUTO: 52.9 FL (ref 37–54)
DEPRECATED RDW RBC AUTO: 53.4 FL (ref 37–54)
DEPRECATED RDW RBC AUTO: 53.4 FL (ref 37–54)
DEPRECATED RDW RBC AUTO: 61.3 FL (ref 37–54)
DOHLE BODIES: PRESENT
DOHLE BODIES: PRESENT
E HISTOLYT DNA STL QL NAA+NON-PROBE: NOT DETECTED
E HISTOLYT DNA STL QL NAA+NON-PROBE: NOT DETECTED
EAEC PAA PLAS AGGR+AATA ST NAA+NON-PRB: NOT DETECTED
EAEC PAA PLAS AGGR+AATA ST NAA+NON-PRB: NOT DETECTED
EC STX1+STX2 GENES STL QL NAA+NON-PROBE: NOT DETECTED
EC STX1+STX2 GENES STL QL NAA+NON-PROBE: NOT DETECTED
EOSINOPHIL # BLD AUTO: 0 10*3/MM3 (ref 0–0.4)
EOSINOPHIL # BLD AUTO: 0 10*3/MM3 (ref 0–0.4)
EOSINOPHIL # BLD AUTO: 0 X10E3/UL (ref 0–0.4)
EOSINOPHIL # BLD AUTO: 0.1 10*3/MM3 (ref 0–0.4)
EOSINOPHIL # BLD MANUAL: 0.16 10*3/MM3 (ref 0–0.4)
EOSINOPHIL # BLD MANUAL: 0.22 10*3/MM3 (ref 0–0.4)
EOSINOPHIL NFR BLD AUTO: 0 %
EOSINOPHIL NFR BLD AUTO: 0 % (ref 0.3–6.2)
EOSINOPHIL NFR BLD AUTO: 0 % (ref 0.3–6.2)
EOSINOPHIL NFR BLD AUTO: 0.6 % (ref 0.3–6.2)
EOSINOPHIL NFR BLD AUTO: 0.9 % (ref 0.3–6.2)
EOSINOPHIL NFR BLD AUTO: 1.9 % (ref 0.3–6.2)
EOSINOPHIL NFR BLD MANUAL: 1 % (ref 0.3–6.2)
EOSINOPHIL NFR BLD MANUAL: 1 % (ref 0.3–6.2)
EPEC EAE GENE STL QL NAA+NON-PROBE: NOT DETECTED
EPEC EAE GENE STL QL NAA+NON-PROBE: NOT DETECTED
ERYTHROCYTE [DISTWIDTH] IN BLOOD BY AUTOMATED COUNT: 13.4 % (ref 11.6–15.4)
ERYTHROCYTE [DISTWIDTH] IN BLOOD BY AUTOMATED COUNT: 14.5 % (ref 12.3–15.4)
ERYTHROCYTE [DISTWIDTH] IN BLOOD BY AUTOMATED COUNT: 14.8 % (ref 12.3–15.4)
ERYTHROCYTE [DISTWIDTH] IN BLOOD BY AUTOMATED COUNT: 15 % (ref 12.3–15.4)
ERYTHROCYTE [DISTWIDTH] IN BLOOD BY AUTOMATED COUNT: 15.4 % (ref 12.3–15.4)
ERYTHROCYTE [DISTWIDTH] IN BLOOD BY AUTOMATED COUNT: 16 % (ref 12.3–15.4)
ERYTHROCYTE [DISTWIDTH] IN BLOOD BY AUTOMATED COUNT: 16.1 % (ref 12.3–15.4)
ERYTHROCYTE [DISTWIDTH] IN BLOOD BY AUTOMATED COUNT: 16.3 % (ref 12.3–15.4)
ERYTHROCYTE [DISTWIDTH] IN BLOOD BY AUTOMATED COUNT: 16.5 % (ref 12.3–15.4)
ERYTHROCYTE [DISTWIDTH] IN BLOOD BY AUTOMATED COUNT: 16.7 % (ref 12.3–15.4)
ERYTHROCYTE [DISTWIDTH] IN BLOOD BY AUTOMATED COUNT: 16.9 % (ref 12.3–15.4)
ERYTHROCYTE [DISTWIDTH] IN BLOOD BY AUTOMATED COUNT: 18.6 % (ref 12.3–15.4)
ERYTHROCYTE [SEDIMENTATION RATE] IN BLOOD: 56 MM/HR (ref 0–20)
ETEC LTA+ST1A+ST1B TOX ST NAA+NON-PROBE: NOT DETECTED
ETEC LTA+ST1A+ST1B TOX ST NAA+NON-PROBE: NOT DETECTED
FIBRINOGEN PPP-MCNC: 113 MG/DL (ref 210–450)
FIBRINOGEN PPP-MCNC: 70 MG/DL (ref 210–450)
FIBRINOGEN PPP-MCNC: 96 MG/DL (ref 210–450)
G LAMBLIA DNA STL QL NAA+NON-PROBE: NOT DETECTED
G LAMBLIA DNA STL QL NAA+NON-PROBE: NOT DETECTED
GFR SERPL CREATININE-BSD FRML MDRD: 101 ML/MIN/1.73
GFR SERPL CREATININE-BSD FRML MDRD: 101 ML/MIN/1.73
GFR SERPL CREATININE-BSD FRML MDRD: 117 ML/MIN/1.73
GFR SERPL CREATININE-BSD FRML MDRD: 132 ML/MIN/1.73
GFR SERPL CREATININE-BSD FRML MDRD: 29 ML/MIN/1.73
GFR SERPL CREATININE-BSD FRML MDRD: 43 ML/MIN/1.73
GFR SERPL CREATININE-BSD FRML MDRD: 49 ML/MIN/1.73
GFR SERPL CREATININE-BSD FRML MDRD: 50 ML/MIN/1.73
GFR SERPL CREATININE-BSD FRML MDRD: 56 ML/MIN/1.73
GFR SERPL CREATININE-BSD FRML MDRD: 69 ML/MIN/1.73
GFR SERPL CREATININE-BSD FRML MDRD: 75 ML/MIN/1.73
GFR SERPL CREATININE-BSD FRML MDRD: 83 ML/MIN/1.73
GFR SERPL CREATININE-BSD FRML MDRD: 97 ML/MIN/1.73
GIANT PLATELETS: ABNORMAL
GLOBULIN SER CALC-MCNC: 3 G/DL (ref 1.5–4.5)
GLOBULIN UR ELPH-MCNC: 0.7 GM/DL
GLOBULIN UR ELPH-MCNC: 0.8 GM/DL
GLOBULIN UR ELPH-MCNC: 0.9 GM/DL
GLOBULIN UR ELPH-MCNC: 0.9 GM/DL
GLOBULIN UR ELPH-MCNC: 1.8 GM/DL
GLOBULIN UR ELPH-MCNC: 2.6 GM/DL
GLOBULIN UR ELPH-MCNC: 2.8 GM/DL
GLOBULIN UR ELPH-MCNC: 3.6 GM/DL
GLOBULIN UR ELPH-MCNC: 3.6 GM/DL
GLOBULIN UR ELPH-MCNC: 3.8 GM/DL
GLUCOSE BLDC GLUCOMTR-MCNC: 109 MG/DL (ref 70–105)
GLUCOSE BLDC GLUCOMTR-MCNC: 109 MG/DL (ref 70–105)
GLUCOSE BLDC GLUCOMTR-MCNC: 112 MG/DL (ref 74–100)
GLUCOSE BLDC GLUCOMTR-MCNC: 112 MG/DL (ref 74–100)
GLUCOSE BLDC GLUCOMTR-MCNC: 113 MG/DL (ref 70–105)
GLUCOSE BLDC GLUCOMTR-MCNC: 113 MG/DL (ref 70–105)
GLUCOSE BLDC GLUCOMTR-MCNC: 132 MG/DL (ref 70–105)
GLUCOSE BLDC GLUCOMTR-MCNC: 132 MG/DL (ref 70–105)
GLUCOSE BLDC GLUCOMTR-MCNC: 140 MG/DL (ref 70–105)
GLUCOSE BLDC GLUCOMTR-MCNC: 147 MG/DL (ref 70–105)
GLUCOSE BLDC GLUCOMTR-MCNC: 37 MG/DL (ref 70–105)
GLUCOSE BLDC GLUCOMTR-MCNC: 77 MG/DL (ref 70–105)
GLUCOSE BLDC GLUCOMTR-MCNC: 85 MG/DL (ref 70–105)
GLUCOSE SERPL-MCNC: 104 MG/DL (ref 65–99)
GLUCOSE SERPL-MCNC: 113 MG/DL (ref 65–99)
GLUCOSE SERPL-MCNC: 115 MG/DL (ref 65–99)
GLUCOSE SERPL-MCNC: 120 MG/DL (ref 65–99)
GLUCOSE SERPL-MCNC: 121 MG/DL (ref 65–99)
GLUCOSE SERPL-MCNC: 122 MG/DL (ref 65–99)
GLUCOSE SERPL-MCNC: 122 MG/DL (ref 65–99)
GLUCOSE SERPL-MCNC: 123 MG/DL (ref 65–99)
GLUCOSE SERPL-MCNC: 138 MG/DL (ref 65–99)
GLUCOSE SERPL-MCNC: 141 MG/DL (ref 65–99)
GLUCOSE SERPL-MCNC: 65 MG/DL (ref 65–99)
GLUCOSE SERPL-MCNC: 84 MG/DL (ref 65–99)
GLUCOSE SERPL-MCNC: 88 MG/DL (ref 65–99)
GLUCOSE SERPL-MCNC: ABNORMAL MG/DL
GLUCOSE UR STRIP-MCNC: NEGATIVE MG/DL
GLUCOSE UR STRIP-MCNC: NEGATIVE MG/DL
GRAM STN SPEC: ABNORMAL
HCO3 BLDA-SCNC: 15.9 MMOL/L (ref 22–26)
HCO3 BLDA-SCNC: 17.1 MMOL/L (ref 21–28)
HCO3 BLDA-SCNC: 17.3 MMOL/L (ref 21–28)
HCO3 BLDA-SCNC: 17.6 MMOL/L (ref 21–28)
HCO3 BLDA-SCNC: 17.7 MMOL/L (ref 21–28)
HCO3 BLDA-SCNC: 19.1 MMOL/L (ref 21–28)
HCO3 BLDA-SCNC: 19.4 MMOL/L (ref 22–26)
HCO3 BLDA-SCNC: 8.6 MMOL/L (ref 21–28)
HCT VFR BLD AUTO: 17.8 % (ref 37.5–51)
HCT VFR BLD AUTO: 20.7 % (ref 37.5–51)
HCT VFR BLD AUTO: 22.9 % (ref 37.5–51)
HCT VFR BLD AUTO: 23 % (ref 37.5–51)
HCT VFR BLD AUTO: 23.9 % (ref 37.5–51)
HCT VFR BLD AUTO: 25.1 % (ref 37.5–51)
HCT VFR BLD AUTO: 32.4 % (ref 37.5–51)
HCT VFR BLD AUTO: 36 % (ref 37.5–51)
HCT VFR BLD AUTO: 36.1 % (ref 37.5–51)
HCT VFR BLD AUTO: 37.8 % (ref 37.5–51)
HCT VFR BLD AUTO: 38.4 % (ref 37.5–51)
HCT VFR BLD AUTO: 39 % (ref 37.5–51)
HCT VFR BLD AUTO: 40.3 % (ref 37.5–51)
HCT VFR BLD AUTO: 40.8 % (ref 37.5–51)
HCT VFR BLD AUTO: 41.7 % (ref 37.5–51)
HCT VFR BLD AUTO: 48.8 % (ref 37.5–51)
HCT VFR BLDA CALC: 25 % (ref 38–51)
HCT VFR BLDA CALC: 26 % (ref 38–51)
HCT VFR BLDA CALC: 29 % (ref 38–51)
HEMODILUTION: NO
HEMODILUTION: YES
HGB BLD-MCNC: 10.6 G/DL (ref 13–17.7)
HGB BLD-MCNC: 12.7 G/DL (ref 13–17.7)
HGB BLD-MCNC: 12.7 G/DL (ref 13–17.7)
HGB BLD-MCNC: 13.1 G/DL (ref 13–17.7)
HGB BLD-MCNC: 13.5 G/DL (ref 13–17.7)
HGB BLD-MCNC: 13.6 G/DL (ref 13–17.7)
HGB BLD-MCNC: 14 G/DL (ref 13–17.7)
HGB BLD-MCNC: 14.5 G/DL (ref 13–17.7)
HGB BLD-MCNC: 14.6 G/DL (ref 13–17.7)
HGB BLD-MCNC: 17.4 G/DL (ref 13–17.7)
HGB BLD-MCNC: 5.5 G/DL (ref 13–17.7)
HGB BLD-MCNC: 6.7 G/DL (ref 13–17.7)
HGB BLD-MCNC: 7.4 G/DL (ref 13–17.7)
HGB BLD-MCNC: 7.5 G/DL (ref 13–17.7)
HGB BLD-MCNC: 7.7 G/DL (ref 13–17.7)
HGB BLD-MCNC: 8.1 G/DL (ref 13–17.7)
HGB BLDA-MCNC: 8.5 G/DL (ref 12–17)
HGB BLDA-MCNC: 8.8 G/DL (ref 12–17)
HGB BLDA-MCNC: 9.9 G/DL (ref 12–17)
HGB UR QL STRIP.AUTO: ABNORMAL
HOLD SPECIMEN: NORMAL
HYALINE CASTS UR QL AUTO: ABNORMAL /LPF
IMM GRANULOCYTES # BLD AUTO: 0.2 X10E3/UL (ref 0–0.1)
IMM GRANULOCYTES NFR BLD AUTO: 1 %
INHALED O2 CONCENTRATION: 100 %
INHALED O2 CONCENTRATION: 100 %
INHALED O2 CONCENTRATION: 80 %
INHALED O2 CONCENTRATION: <21 %
INR PPP: 1.13 (ref 0.93–1.1)
INR PPP: 1.13 (ref 0.93–1.1)
INR PPP: 1.37 (ref 0.93–1.1)
INR PPP: 3.53 (ref 0.93–1.1)
KETONES UR QL STRIP: ABNORMAL
KETONES UR QL: NEGATIVE
LAB AP CASE REPORT: NORMAL
LARGE PLATELETS: ABNORMAL
LDH SERPL-CCNC: >2500 U/L (ref 135–225)
LEUKOCYTE EST, POC: NEGATIVE
LEUKOCYTE ESTERASE UR QL STRIP.AUTO: NEGATIVE
LIPASE SERPL-CCNC: 11 U/L (ref 13–60)
LIPASE SERPL-CCNC: 17 U/L (ref 13–78)
LYMPHOCYTES # BLD AUTO: 0.4 10*3/MM3 (ref 0.7–3.1)
LYMPHOCYTES # BLD AUTO: 0.4 X10E3/UL (ref 0.7–3.1)
LYMPHOCYTES # BLD AUTO: 0.5 10*3/MM3 (ref 0.7–3.1)
LYMPHOCYTES # BLD AUTO: 0.5 10*3/MM3 (ref 0.7–3.1)
LYMPHOCYTES # BLD AUTO: 0.8 10*3/MM3 (ref 0.7–3.1)
LYMPHOCYTES # BLD AUTO: 0.8 10*3/MM3 (ref 0.7–3.1)
LYMPHOCYTES # BLD MANUAL: 0.55 10*3/MM3 (ref 0.7–3.1)
LYMPHOCYTES # BLD MANUAL: 0.83 10*3/MM3 (ref 0.7–3.1)
LYMPHOCYTES # BLD MANUAL: 0.89 10*3/MM3 (ref 0.7–3.1)
LYMPHOCYTES # BLD MANUAL: 1.07 10*3/MM3 (ref 0.7–3.1)
LYMPHOCYTES # BLD MANUAL: 1.09 10*3/MM3 (ref 0.7–3.1)
LYMPHOCYTES # BLD MANUAL: 2.68 10*3/MM3 (ref 0.7–3.1)
LYMPHOCYTES # BLD MANUAL: 4.38 10*3/MM3 (ref 0.7–3.1)
LYMPHOCYTES # BLD MANUAL: 4.67 10*3/MM3 (ref 0.7–3.1)
LYMPHOCYTES NFR BLD AUTO: 10.4 % (ref 19.6–45.3)
LYMPHOCYTES NFR BLD AUTO: 3 %
LYMPHOCYTES NFR BLD AUTO: 3.8 % (ref 19.6–45.3)
LYMPHOCYTES NFR BLD AUTO: 5.4 % (ref 19.6–45.3)
LYMPHOCYTES NFR BLD AUTO: 5.7 % (ref 19.6–45.3)
LYMPHOCYTES NFR BLD AUTO: 7.7 % (ref 19.6–45.3)
LYMPHOCYTES NFR BLD MANUAL: 1 % (ref 5–12)
LYMPHOCYTES NFR BLD MANUAL: 2 % (ref 5–12)
LYMPHOCYTES NFR BLD MANUAL: 6 % (ref 19.6–45.3)
LYMPHOCYTES NFR BLD MANUAL: 6 % (ref 5–12)
LYMPHOCYTES NFR BLD MANUAL: 6 % (ref 5–12)
LYMPHOCYTES NFR BLD MANUAL: 7 % (ref 5–12)
MAGNESIUM SERPL-MCNC: 1.9 MG/DL (ref 1.6–2.4)
MAGNESIUM SERPL-MCNC: 2 MG/DL (ref 1.6–2.4)
MAGNESIUM SERPL-MCNC: 2.1 MG/DL (ref 1.6–2.4)
MAGNESIUM SERPL-MCNC: 2.1 MG/DL (ref 1.6–2.4)
MAGNESIUM SERPL-MCNC: 2.4 MG/DL (ref 1.6–2.4)
MCH RBC QN AUTO: 28.6 PG (ref 26.6–33)
MCH RBC QN AUTO: 28.8 PG (ref 26.6–33)
MCH RBC QN AUTO: 29.2 PG (ref 26.6–33)
MCH RBC QN AUTO: 29.4 PG (ref 26.6–33)
MCH RBC QN AUTO: 29.9 PG (ref 26.6–33)
MCH RBC QN AUTO: 30.3 PG (ref 26.6–33)
MCH RBC QN AUTO: 30.8 PG (ref 26.6–33)
MCH RBC QN AUTO: 31.2 PG (ref 26.6–33)
MCH RBC QN AUTO: 33 PG (ref 26.6–33)
MCH RBC QN AUTO: 33.2 PG (ref 26.6–33)
MCH RBC QN AUTO: 33.5 PG (ref 26.6–33)
MCH RBC QN AUTO: 33.6 PG (ref 26.6–33)
MCH RBC QN AUTO: 33.6 PG (ref 26.6–33)
MCH RBC QN AUTO: 33.7 PG (ref 26.6–33)
MCHC RBC AUTO-ENTMCNC: 31.1 G/DL (ref 31.5–35.7)
MCHC RBC AUTO-ENTMCNC: 32.1 G/DL (ref 31.5–35.7)
MCHC RBC AUTO-ENTMCNC: 32.3 G/DL (ref 31.5–35.7)
MCHC RBC AUTO-ENTMCNC: 32.3 G/DL (ref 31.5–35.7)
MCHC RBC AUTO-ENTMCNC: 32.5 G/DL (ref 31.5–35.7)
MCHC RBC AUTO-ENTMCNC: 32.6 G/DL (ref 31.5–35.7)
MCHC RBC AUTO-ENTMCNC: 32.6 G/DL (ref 31.5–35.7)
MCHC RBC AUTO-ENTMCNC: 34.7 G/DL (ref 31.5–35.7)
MCHC RBC AUTO-ENTMCNC: 34.7 G/DL (ref 31.5–35.7)
MCHC RBC AUTO-ENTMCNC: 34.9 G/DL (ref 31.5–35.7)
MCHC RBC AUTO-ENTMCNC: 34.9 G/DL (ref 31.5–35.7)
MCHC RBC AUTO-ENTMCNC: 35.2 G/DL (ref 31.5–35.7)
MCHC RBC AUTO-ENTMCNC: 35.2 G/DL (ref 31.5–35.7)
MCHC RBC AUTO-ENTMCNC: 35.6 G/DL (ref 31.5–35.7)
MCV RBC AUTO: 89 FL (ref 79–97)
MCV RBC AUTO: 89 FL (ref 79–97)
MCV RBC AUTO: 89.1 FL (ref 79–97)
MCV RBC AUTO: 90.2 FL (ref 79–97)
MCV RBC AUTO: 91.9 FL (ref 79–97)
MCV RBC AUTO: 93.6 FL (ref 79–97)
MCV RBC AUTO: 93.8 FL (ref 79–97)
MCV RBC AUTO: 94.2 FL (ref 79–97)
MCV RBC AUTO: 94.4 FL (ref 79–97)
MCV RBC AUTO: 94.4 FL (ref 79–97)
MCV RBC AUTO: 94.9 FL (ref 79–97)
MCV RBC AUTO: 95.1 FL (ref 79–97)
MCV RBC AUTO: 96.5 FL (ref 79–97)
MCV RBC AUTO: 96.8 FL (ref 79–97)
METAMYELOCYTES NFR BLD MANUAL: 12 % (ref 0–0)
METAMYELOCYTES NFR BLD MANUAL: 2 % (ref 0–0)
METAMYELOCYTES NFR BLD MANUAL: 3 % (ref 0–0)
METAMYELOCYTES NFR BLD MANUAL: 4 % (ref 0–0)
METAMYELOCYTES NFR BLD MANUAL: 4 % (ref 0–0)
METAMYELOCYTES NFR BLD MANUAL: 6 % (ref 0–0)
METAMYELOCYTES NFR BLD MANUAL: 8 % (ref 0–0)
MODALITY: ABNORMAL
MONOCYTES # BLD AUTO: 0.3 10*3/MM3 (ref 0.1–0.9)
MONOCYTES # BLD AUTO: 0.6 10*3/MM3 (ref 0.1–0.9)
MONOCYTES # BLD AUTO: 0.7 10*3/MM3 (ref 0.1–0.9)
MONOCYTES # BLD AUTO: 0.8 10*3/MM3 (ref 0.1–0.9)
MONOCYTES # BLD AUTO: 0.8 10*3/MM3 (ref 0.1–0.9)
MONOCYTES # BLD AUTO: 0.8 X10E3/UL (ref 0.1–0.9)
MONOCYTES # BLD AUTO: 0.83 10*3/MM3 (ref 0.1–0.9)
MONOCYTES # BLD: 0.11 10*3/MM3 (ref 0.1–0.9)
MONOCYTES # BLD: 0.16 10*3/MM3 (ref 0.1–0.9)
MONOCYTES # BLD: 0.22 10*3/MM3 (ref 0.1–0.9)
MONOCYTES # BLD: 0.53 10*3/MM3 (ref 0.1–0.9)
MONOCYTES # BLD: 0.78 10*3/MM3 (ref 0.1–0.9)
MONOCYTES # BLD: 1.53 10*3/MM3 (ref 0.1–0.9)
MONOCYTES NFR BLD AUTO: 10 % (ref 5–12)
MONOCYTES NFR BLD AUTO: 3.3 % (ref 5–12)
MONOCYTES NFR BLD AUTO: 5 %
MONOCYTES NFR BLD AUTO: 6.6 % (ref 5–12)
MONOCYTES NFR BLD AUTO: 8.1 % (ref 5–12)
MONOCYTES NFR BLD AUTO: 8.3 % (ref 5–12)
MORPHOLOGY BLD-IMP: ABNORMAL
MYELOCYTES NFR BLD MANUAL: 3 % (ref 0–0)
MYELOCYTES NFR BLD MANUAL: 3 % (ref 0–0)
MYELOCYTES NFR BLD MANUAL: 4 % (ref 0–0)
MYELOCYTES NFR BLD MANUAL: 4 % (ref 0–0)
MYELOCYTES NFR BLD MANUAL: 9 % (ref 0–0)
NEUTROPHILS # BLD AUTO: 12.23 10*3/MM3 (ref 1.7–7)
NEUTROPHILS # BLD AUTO: 12.4 10*3/MM3 (ref 1.7–7)
NEUTROPHILS # BLD AUTO: 12.48 10*3/MM3 (ref 1.7–7)
NEUTROPHILS # BLD AUTO: 15.3 X10E3/UL (ref 1.4–7)
NEUTROPHILS # BLD AUTO: 32.68 10*3/MM3 (ref 1.7–7)
NEUTROPHILS # BLD AUTO: 33.32 10*3/MM3 (ref 1.7–7)
NEUTROPHILS # BLD AUTO: 4.18 10*3/MM3 (ref 1.7–7)
NEUTROPHILS # BLD AUTO: 6.94 10*3/MM3 (ref 1.7–7)
NEUTROPHILS # BLD AUTO: 8.55 10*3/MM3 (ref 1.7–7)
NEUTROPHILS NFR BLD AUTO: 10.9 10*3/MM3 (ref 1.7–7)
NEUTROPHILS NFR BLD AUTO: 5.7 10*3/MM3 (ref 1.7–7)
NEUTROPHILS NFR BLD AUTO: 6.2 10*3/MM3 (ref 1.7–7)
NEUTROPHILS NFR BLD AUTO: 7.9 10*3/MM3 (ref 1.7–7)
NEUTROPHILS NFR BLD AUTO: 79.5 % (ref 42.7–76)
NEUTROPHILS NFR BLD AUTO: 8.5 10*3/MM3 (ref 1.7–7)
NEUTROPHILS NFR BLD AUTO: 83.2 % (ref 42.7–76)
NEUTROPHILS NFR BLD AUTO: 84.5 % (ref 42.7–76)
NEUTROPHILS NFR BLD AUTO: 88.6 % (ref 42.7–76)
NEUTROPHILS NFR BLD AUTO: 90.9 % (ref 42.7–76)
NEUTROPHILS NFR BLD AUTO: 91 %
NEUTROPHILS NFR BLD MANUAL: 11 % (ref 42.7–76)
NEUTROPHILS NFR BLD MANUAL: 26 % (ref 42.7–76)
NEUTROPHILS NFR BLD MANUAL: 36 % (ref 42.7–76)
NEUTROPHILS NFR BLD MANUAL: 40 % (ref 42.7–76)
NEUTROPHILS NFR BLD MANUAL: 43 % (ref 42.7–76)
NEUTROPHILS NFR BLD MANUAL: 44 % (ref 42.7–76)
NEUTROPHILS NFR BLD MANUAL: 59 % (ref 42.7–76)
NEUTROPHILS NFR BLD MANUAL: 8 % (ref 42.7–76)
NEUTS BAND NFR BLD MANUAL: 29 % (ref 0–5)
NEUTS BAND NFR BLD MANUAL: 35 % (ref 0–5)
NEUTS BAND NFR BLD MANUAL: 40 % (ref 0–5)
NEUTS BAND NFR BLD MANUAL: 40 % (ref 0–5)
NEUTS BAND NFR BLD MANUAL: 43 % (ref 0–5)
NEUTS BAND NFR BLD MANUAL: 46 % (ref 0–5)
NEUTS BAND NFR BLD MANUAL: 58 % (ref 0–5)
NEUTS BAND NFR BLD MANUAL: 69 % (ref 0–5)
NEUTS VAC BLD QL SMEAR: ABNORMAL
NITRITE UR QL STRIP: NEGATIVE
NITRITE UR-MCNC: NEGATIVE MG/ML
NOROVIRUS GI+II RNA STL QL NAA+NON-PROBE: NOT DETECTED
NOROVIRUS GI+II RNA STL QL NAA+NON-PROBE: NOT DETECTED
NRBC BLD AUTO-RTO: 0 /100 WBC (ref 0–0.2)
NRBC BLD AUTO-RTO: 0.1 /100 WBC (ref 0–0.2)
NRBC SPEC MANUAL: 1 /100 WBC (ref 0–0.2)
NRBC SPEC MANUAL: 10 /100 WBC (ref 0–0.2)
NRBC SPEC MANUAL: 2 /100 WBC (ref 0–0.2)
NRBC SPEC MANUAL: 25 /100 WBC (ref 0–0.2)
NRBC SPEC MANUAL: 8 /100 WBC (ref 0–0.2)
NRBC SPEC MANUAL: 8 /100 WBC (ref 0–0.2)
NT-PROBNP SERPL-MCNC: ABNORMAL PG/ML (ref 0–900)
P SHIGELLOIDES DNA STL QL NAA+NON-PROBE: NOT DETECTED
P SHIGELLOIDES DNA STL QL NAA+NON-PROBE: NOT DETECTED
PATH REPORT.FINAL DX SPEC: NORMAL
PATHOLOGY REVIEW: YES
PCO2 BLDA: 20.1 MM HG (ref 35–48)
PCO2 BLDA: 28.1 MM HG (ref 35–48)
PCO2 BLDA: 28.2 MM HG (ref 35–48)
PCO2 BLDA: 32 MM HG (ref 35–48)
PCO2 BLDA: 37.6 MM HG (ref 35–48)
PCO2 BLDA: 47.8 MM HG (ref 35–45)
PCO2 BLDA: 50.1 MM HG (ref 35–45)
PCO2 BLDA: 58.7 MM HG (ref 35–48)
PEEP RESPIRATORY: 5 CM[H2O]
PH BLDA: 7.12 PH UNITS (ref 7.35–7.45)
PH BLDA: 7.13 PH UNITS (ref 7.35–7.45)
PH BLDA: 7.2 PH UNITS (ref 7.35–7.45)
PH BLDA: 7.24 PH UNITS (ref 7.35–7.45)
PH BLDA: 7.27 PH UNITS (ref 7.35–7.45)
PH BLDA: 7.33 PH UNITS (ref 7.35–7.45)
PH BLDA: 7.4 PH UNITS (ref 7.35–7.45)
PH BLDA: 7.41 PH UNITS (ref 7.35–7.45)
PH UR STRIP.AUTO: 6.5 [PH] (ref 5–8)
PH UR: 7 [PH] (ref 5–8)
PHOSPHATE SERPL-MCNC: 6.7 MG/DL (ref 2.5–4.5)
PHOSPHATE SERPL-MCNC: 8.1 MG/DL (ref 2.5–4.5)
PHOSPHATE SERPL-MCNC: 8.5 MG/DL (ref 2.5–4.5)
PLASMA CELL PREC NFR BLD MANUAL: 1 % (ref 0–0)
PLAT MORPH BLD: NORMAL
PLATELET # BLD AUTO: 214 10*3/MM3 (ref 140–450)
PLATELET # BLD AUTO: 23 10*3/MM3 (ref 140–450)
PLATELET # BLD AUTO: 24 10*3/MM3 (ref 140–450)
PLATELET # BLD AUTO: 282 10*3/MM3 (ref 140–450)
PLATELET # BLD AUTO: 29 10*3/MM3 (ref 140–450)
PLATELET # BLD AUTO: 343 10*3/MM3 (ref 140–450)
PLATELET # BLD AUTO: 357 10*3/MM3 (ref 140–450)
PLATELET # BLD AUTO: 38 10*3/MM3 (ref 140–450)
PLATELET # BLD AUTO: 392 10*3/MM3 (ref 140–450)
PLATELET # BLD AUTO: 455 X10E3/UL (ref 150–450)
PLATELET # BLD AUTO: 467 10*3/MM3 (ref 140–450)
PLATELET # BLD AUTO: 478 10*3/MM3 (ref 140–450)
PLATELET # BLD AUTO: 492 10*3/MM3 (ref 140–450)
PLATELET # BLD AUTO: 86 10*3/MM3 (ref 140–450)
PMV BLD AUTO: 6.1 FL (ref 6–12)
PMV BLD AUTO: 6.3 FL (ref 6–12)
PMV BLD AUTO: 6.4 FL (ref 6–12)
PMV BLD AUTO: 6.4 FL (ref 6–12)
PMV BLD AUTO: 7.1 FL (ref 6–12)
PMV BLD AUTO: 7.2 FL (ref 6–12)
PMV BLD AUTO: 7.6 FL (ref 6–12)
PMV BLD AUTO: 7.8 FL (ref 6–12)
PMV BLD AUTO: 7.8 FL (ref 6–12)
PMV BLD AUTO: 8.6 FL (ref 6–12)
PMV BLD AUTO: 8.6 FL (ref 6–12)
PMV BLD AUTO: 9.3 FL (ref 6–12)
PMV BLD AUTO: 9.5 FL (ref 6–12)
PO2 BLDA: 140.4 MM HG (ref 83–108)
PO2 BLDA: 176.5 MM HG (ref 83–108)
PO2 BLDA: 197.4 MM HG (ref 83–108)
PO2 BLDA: 298 MM HG (ref 80–105)
PO2 BLDA: 344.6 MM HG (ref 83–108)
PO2 BLDA: 356 MM HG (ref 80–105)
PO2 BLDA: 71.6 MM HG (ref 83–108)
PO2 BLDA: 82.9 MM HG (ref 83–108)
POIKILOCYTOSIS BLD QL SMEAR: ABNORMAL
POIKILOCYTOSIS BLD QL SMEAR: ABNORMAL
POLYCHROMASIA BLD QL SMEAR: ABNORMAL
POTASSIUM BLDA-SCNC: 5.4 MMOL/L (ref 3.5–4.5)
POTASSIUM BLDA-SCNC: 5.4 MMOL/L (ref 3.5–4.9)
POTASSIUM BLDA-SCNC: 6 MMOL/L (ref 3.5–4.9)
POTASSIUM SERPL-SCNC: 3.5 MMOL/L (ref 3.5–5.2)
POTASSIUM SERPL-SCNC: 3.8 MMOL/L (ref 3.5–5.2)
POTASSIUM SERPL-SCNC: 4.1 MMOL/L (ref 3.5–5.2)
POTASSIUM SERPL-SCNC: 4.1 MMOL/L (ref 3.5–5.2)
POTASSIUM SERPL-SCNC: 4.2 MMOL/L (ref 3.5–5.2)
POTASSIUM SERPL-SCNC: 4.4 MMOL/L (ref 3.5–5.2)
POTASSIUM SERPL-SCNC: 4.4 MMOL/L (ref 3.5–5.2)
POTASSIUM SERPL-SCNC: 4.6 MMOL/L (ref 3.5–5.2)
POTASSIUM SERPL-SCNC: 4.6 MMOL/L (ref 3.5–5.2)
POTASSIUM SERPL-SCNC: 4.7 MMOL/L (ref 3.5–5.2)
POTASSIUM SERPL-SCNC: 4.8 MMOL/L (ref 3.5–5.2)
POTASSIUM SERPL-SCNC: 5 MMOL/L (ref 3.5–5.2)
POTASSIUM SERPL-SCNC: 6.4 MMOL/L (ref 3.5–5.2)
POTASSIUM SERPL-SCNC: ABNORMAL MMOL/L
PROT SERPL-MCNC: 2.4 G/DL (ref 6–8.5)
PROT SERPL-MCNC: 2.5 G/DL (ref 6–8.5)
PROT SERPL-MCNC: 2.7 G/DL (ref 6–8.5)
PROT SERPL-MCNC: 2.8 G/DL (ref 6–8.5)
PROT SERPL-MCNC: 3.5 G/DL (ref 6–8.5)
PROT SERPL-MCNC: 4.6 G/DL (ref 6–8.5)
PROT SERPL-MCNC: 5.2 G/DL (ref 6–8.5)
PROT SERPL-MCNC: 5.6 G/DL (ref 6–8.5)
PROT SERPL-MCNC: 5.9 G/DL (ref 6–8.5)
PROT SERPL-MCNC: 6.1 G/DL (ref 6–8.5)
PROT SERPL-MCNC: 6.9 G/DL (ref 6–8.5)
PROT UR QL STRIP: NEGATIVE
PROT UR STRIP-MCNC: NEGATIVE MG/DL
PROTHROMBIN TIME: 12.4 SECONDS (ref 9.6–11.7)
PROTHROMBIN TIME: 12.4 SECONDS (ref 9.6–11.7)
PROTHROMBIN TIME: 14.9 SECONDS (ref 9.6–11.7)
PROTHROMBIN TIME: 35.7 SECONDS (ref 9.6–11.7)
QT INTERVAL: 335 MS
QT INTERVAL: 359 MS
RBC # BLD AUTO: 1.89 10*6/MM3 (ref 4.14–5.8)
RBC # BLD AUTO: 2.21 10*6/MM3 (ref 4.14–5.8)
RBC # BLD AUTO: 2.54 10*6/MM3 (ref 4.14–5.8)
RBC # BLD AUTO: 2.58 10*6/MM3 (ref 4.14–5.8)
RBC # BLD AUTO: 2.69 10*6/MM3 (ref 4.14–5.8)
RBC # BLD AUTO: 2.73 10*6/MM3 (ref 4.14–5.8)
RBC # BLD AUTO: 3.44 10*6/MM3 (ref 4.14–5.8)
RBC # BLD AUTO: 3.8 10*6/MM3 (ref 4.14–5.8)
RBC # BLD AUTO: 3.82 10*6/MM3 (ref 4.14–5.8)
RBC # BLD AUTO: 3.91 10*6/MM3 (ref 4.14–5.8)
RBC # BLD AUTO: 4.24 10*6/MM3 (ref 4.14–5.8)
RBC # BLD AUTO: 4.32 10*6/MM3 (ref 4.14–5.8)
RBC # BLD AUTO: 4.36 X10E6/UL (ref 4.14–5.8)
RBC # BLD AUTO: 5.17 10*6/MM3 (ref 4.14–5.8)
RBC # UR STRIP: ABNORMAL /UL
RBC # UR: ABNORMAL /HPF
RBC MORPH BLD: NORMAL
REF LAB TEST METHOD: ABNORMAL
RESPIRATORY RATE: 20
RESPIRATORY RATE: 24
RH BLD: NEGATIVE
RH BLD: NEGATIVE
RVA RNA STL QL NAA+NON-PROBE: NOT DETECTED
RVA RNA STL QL NAA+NON-PROBE: NOT DETECTED
S ENT+BONG DNA STL QL NAA+NON-PROBE: NOT DETECTED
S ENT+BONG DNA STL QL NAA+NON-PROBE: NOT DETECTED
SAO2 % BLDCOA: 100 % (ref 95–98)
SAO2 % BLDCOA: 100 % (ref 95–98)
SAO2 % BLDCOA: 94.4 % (ref 94–98)
SAO2 % BLDCOA: 94.6 % (ref 94–98)
SAO2 % BLDCOA: 99 % (ref 94–98)
SAO2 % BLDCOA: 99.6 % (ref 94–98)
SAO2 % BLDCOA: 99.6 % (ref 94–98)
SAO2 % BLDCOA: 99.9 % (ref 94–98)
SAPO I+II+IV+V RNA STL QL NAA+NON-PROBE: NOT DETECTED
SAPO I+II+IV+V RNA STL QL NAA+NON-PROBE: NOT DETECTED
SARS-COV-2 RNA PNL SPEC NAA+PROBE: NOT DETECTED
SARS-COV-2 RNA PNL SPEC NAA+PROBE: NOT DETECTED
SCAN SLIDE: NORMAL
SHIGELLA SP+EIEC IPAH ST NAA+NON-PROBE: NOT DETECTED
SHIGELLA SP+EIEC IPAH ST NAA+NON-PROBE: NOT DETECTED
SMALL PLATELETS BLD QL SMEAR: ABNORMAL
SMALL PLATELETS BLD QL SMEAR: ABNORMAL
SMALL PLATELETS BLD QL SMEAR: ADEQUATE
SODIUM BLD-SCNC: 129 MMOL/L (ref 138–146)
SODIUM BLD-SCNC: 131 MMOL/L (ref 138–146)
SODIUM BLD-SCNC: 136 MMOL/L (ref 138–146)
SODIUM SERPL-SCNC: 123 MMOL/L (ref 136–145)
SODIUM SERPL-SCNC: 125 MMOL/L (ref 136–145)
SODIUM SERPL-SCNC: 127 MMOL/L (ref 134–144)
SODIUM SERPL-SCNC: 127 MMOL/L (ref 136–145)
SODIUM SERPL-SCNC: 129 MMOL/L (ref 136–145)
SODIUM SERPL-SCNC: 131 MMOL/L (ref 136–145)
SODIUM SERPL-SCNC: 133 MMOL/L (ref 136–145)
SODIUM SERPL-SCNC: 140 MMOL/L (ref 136–145)
SODIUM SERPL-SCNC: 140 MMOL/L (ref 136–145)
SODIUM SERPL-SCNC: 141 MMOL/L (ref 136–145)
SODIUM SERPL-SCNC: 141 MMOL/L (ref 136–145)
SODIUM SERPL-SCNC: 143 MMOL/L (ref 136–145)
SP GR UR STRIP: 1.04 (ref 1–1.03)
SP GR UR: 1.01 (ref 1–1.03)
SQUAMOUS #/AREA URNS HPF: ABNORMAL /HPF
T&S EXPIRATION DATE: NORMAL
T&S EXPIRATION DATE: NORMAL
TOTAL RATE: 22 BREATHS/MINUTE
TOTAL RATE: 30 BREATHS/MINUTE
TOXIC GRANULATION: ABNORMAL
TROPONIN T SERPL-MCNC: 0.16 NG/ML (ref 0–0.03)
TROPONIN T SERPL-MCNC: 0.22 NG/ML (ref 0–0.03)
TROPONIN T SERPL-MCNC: 0.32 NG/ML (ref 0–0.03)
TROPONIN T SERPL-MCNC: 0.32 NG/ML (ref 0–0.03)
TROPONIN T SERPL-MCNC: 0.34 NG/ML (ref 0–0.03)
UNIT  ABO: NORMAL
UNIT  ABO: NORMAL
UNIT  RH: NORMAL
UNIT  RH: NORMAL
UROBILINOGEN UR QL STRIP: ABNORMAL
UROBILINOGEN UR QL: NORMAL
V CHOL+PARA+VUL DNA STL QL NAA+NON-PROBE: NOT DETECTED
V CHOL+PARA+VUL DNA STL QL NAA+NON-PROBE: NOT DETECTED
V CHOLERAE DNA STL QL NAA+NON-PROBE: NOT DETECTED
V CHOLERAE DNA STL QL NAA+NON-PROBE: NOT DETECTED
VARIANT LYMPHS NFR BLD MANUAL: 1 % (ref 0–5)
VARIANT LYMPHS NFR BLD MANUAL: 12 % (ref 19.6–45.3)
VARIANT LYMPHS NFR BLD MANUAL: 12 % (ref 19.6–45.3)
VARIANT LYMPHS NFR BLD MANUAL: 2 % (ref 0–5)
VARIANT LYMPHS NFR BLD MANUAL: 20 % (ref 19.6–45.3)
VARIANT LYMPHS NFR BLD MANUAL: 6 % (ref 19.6–45.3)
VARIANT LYMPHS NFR BLD MANUAL: 7 % (ref 19.6–45.3)
VARIANT LYMPHS NFR BLD MANUAL: 8 % (ref 19.6–45.3)
VARIANT LYMPHS NFR BLD MANUAL: 8 % (ref 19.6–45.3)
VENTILATOR MODE: ABNORMAL
VT ON VENT VENT: 450 ML
VT ON VENT VENT: 500 ML
WBC # BLD AUTO: 12.3 10*3/MM3 (ref 3.4–10.8)
WBC # BLD AUTO: 13.9 10*3/MM3 (ref 3.4–10.8)
WBC # BLD AUTO: 16.7 X10E3/UL (ref 3.4–10.8)
WBC # BLD AUTO: 7.3 10*3/MM3 (ref 3.4–10.8)
WBC MORPH BLD: NORMAL
WBC NRBC COR # BLD: 10.2 10*3/MM3 (ref 3.4–10.8)
WBC NRBC COR # BLD: 11.1 10*3/MM3 (ref 3.4–10.8)
WBC NRBC COR # BLD: 15.5 10*3/MM3 (ref 3.4–10.8)
WBC NRBC COR # BLD: 21.9 10*3/MM3 (ref 3.4–10.8)
WBC NRBC COR # BLD: 38.3 10*3/MM3 (ref 3.4–10.8)
WBC NRBC COR # BLD: 38.9 10*3/MM3 (ref 3.4–10.8)
WBC NRBC COR # BLD: 5.5 10*3/MM3 (ref 3.4–10.8)
WBC NRBC COR # BLD: 7.2 10*3/MM3 (ref 3.4–10.8)
WBC NRBC COR # BLD: 8.7 10*3/MM3 (ref 3.4–10.8)
WBC NRBC COR # BLD: 8.9 10*3/MM3 (ref 3.4–10.8)
WBC UR QL AUTO: ABNORMAL /HPF
WHOLE BLOOD HOLD SPECIMEN: NORMAL
WHOLE BLOOD HOLD SPECIMEN: NORMAL
Y ENTEROCOL DNA STL QL NAA+NON-PROBE: NOT DETECTED
Y ENTEROCOL DNA STL QL NAA+NON-PROBE: NOT DETECTED

## 2021-01-01 PROCEDURE — 93010 ELECTROCARDIOGRAM REPORT: CPT | Performed by: INTERNAL MEDICINE

## 2021-01-01 PROCEDURE — 25010000002 PHENYLEPHRINE 10 MG/ML SOLUTION: Performed by: NURSE PRACTITIONER

## 2021-01-01 PROCEDURE — 82962 GLUCOSE BLOOD TEST: CPT

## 2021-01-01 PROCEDURE — 85018 HEMOGLOBIN: CPT

## 2021-01-01 PROCEDURE — 36430 TRANSFUSION BLD/BLD COMPNT: CPT

## 2021-01-01 PROCEDURE — 25010000002 EPINEPHRINE 1 MG/ML SOLUTION 30 ML VIAL: Performed by: NURSE PRACTITIONER

## 2021-01-01 PROCEDURE — 83605 ASSAY OF LACTIC ACID: CPT

## 2021-01-01 PROCEDURE — 82330 ASSAY OF CALCIUM: CPT

## 2021-01-01 PROCEDURE — 85300 ANTITHROMBIN III ACTIVITY: CPT | Performed by: NURSE PRACTITIONER

## 2021-01-01 PROCEDURE — P9047 ALBUMIN (HUMAN), 25%, 50ML: HCPCS | Performed by: INTERNAL MEDICINE

## 2021-01-01 PROCEDURE — 85301 ANTITHROMBIN III ANTIGEN: CPT | Performed by: NURSE PRACTITIONER

## 2021-01-01 PROCEDURE — 94799 UNLISTED PULMONARY SVC/PX: CPT

## 2021-01-01 PROCEDURE — 99213 OFFICE O/P EST LOW 20 MIN: CPT | Performed by: NURSE PRACTITIONER

## 2021-01-01 PROCEDURE — 99239 HOSP IP/OBS DSCHRG MGMT >30: CPT | Performed by: HOSPITALIST

## 2021-01-01 PROCEDURE — 25010000002 HEPARIN (PORCINE) PER 1000 UNITS: Performed by: HOSPITALIST

## 2021-01-01 PROCEDURE — 87635 SARS-COV-2 COVID-19 AMP PRB: CPT | Performed by: NURSE PRACTITIONER

## 2021-01-01 PROCEDURE — 25010000002 HYDROMORPHONE PER 4 MG: Performed by: NURSE PRACTITIONER

## 2021-01-01 PROCEDURE — 93005 ELECTROCARDIOGRAM TRACING: CPT

## 2021-01-01 PROCEDURE — 83605 ASSAY OF LACTIC ACID: CPT | Performed by: NURSE PRACTITIONER

## 2021-01-01 PROCEDURE — P9016 RBC LEUKOCYTES REDUCED: HCPCS

## 2021-01-01 PROCEDURE — P9035 PLATELET PHERES LEUKOREDUCED: HCPCS

## 2021-01-01 PROCEDURE — 82803 BLOOD GASES ANY COMBINATION: CPT

## 2021-01-01 PROCEDURE — 71045 X-RAY EXAM CHEST 1 VIEW: CPT

## 2021-01-01 PROCEDURE — 80053 COMPREHEN METABOLIC PANEL: CPT | Performed by: SURGERY

## 2021-01-01 PROCEDURE — 85014 HEMATOCRIT: CPT | Performed by: NURSE PRACTITIONER

## 2021-01-01 PROCEDURE — 99232 SBSQ HOSP IP/OBS MODERATE 35: CPT | Performed by: INTERNAL MEDICINE

## 2021-01-01 PROCEDURE — 86850 RBC ANTIBODY SCREEN: CPT | Performed by: EMERGENCY MEDICINE

## 2021-01-01 PROCEDURE — 93306 TTE W/DOPPLER COMPLETE: CPT

## 2021-01-01 PROCEDURE — 83880 ASSAY OF NATRIURETIC PEPTIDE: CPT | Performed by: NURSE PRACTITIONER

## 2021-01-01 PROCEDURE — 99024 POSTOP FOLLOW-UP VISIT: CPT | Performed by: SURGERY

## 2021-01-01 PROCEDURE — 99221 1ST HOSP IP/OBS SF/LOW 40: CPT | Performed by: NURSE PRACTITIONER

## 2021-01-01 PROCEDURE — 74177 CT ABD & PELVIS W/CONTRAST: CPT

## 2021-01-01 PROCEDURE — 99223 1ST HOSP IP/OBS HIGH 75: CPT | Performed by: HOSPITALIST

## 2021-01-01 PROCEDURE — 99214 OFFICE O/P EST MOD 30 MIN: CPT | Performed by: FAMILY MEDICINE

## 2021-01-01 PROCEDURE — 84484 ASSAY OF TROPONIN QUANT: CPT | Performed by: NURSE PRACTITIONER

## 2021-01-01 PROCEDURE — 86850 RBC ANTIBODY SCREEN: CPT | Performed by: NURSE PRACTITIONER

## 2021-01-01 PROCEDURE — 84132 ASSAY OF SERUM POTASSIUM: CPT

## 2021-01-01 PROCEDURE — 99232 SBSQ HOSP IP/OBS MODERATE 35: CPT | Performed by: NURSE PRACTITIONER

## 2021-01-01 PROCEDURE — 1111F DSCHRG MED/CURRENT MED MERGE: CPT | Performed by: FAMILY MEDICINE

## 2021-01-01 PROCEDURE — 87493 C DIFF AMPLIFIED PROBE: CPT | Performed by: NURSE PRACTITIONER

## 2021-01-01 PROCEDURE — 0D1B0Z4 BYPASS ILEUM TO CUTANEOUS, OPEN APPROACH: ICD-10-PCS | Performed by: SURGERY

## 2021-01-01 PROCEDURE — P9047 ALBUMIN (HUMAN), 25%, 50ML: HCPCS | Performed by: STUDENT IN AN ORGANIZED HEALTH CARE EDUCATION/TRAINING PROGRAM

## 2021-01-01 PROCEDURE — 25010000002 HYDROCORTISONE SODIUM SUCCINATE 100 MG RECONSTITUTED SOLUTION: Performed by: EMERGENCY MEDICINE

## 2021-01-01 PROCEDURE — 85652 RBC SED RATE AUTOMATED: CPT | Performed by: NURSE PRACTITIONER

## 2021-01-01 PROCEDURE — 25010000002 ALBUMIN HUMAN 25% PER 50 ML: Performed by: STUDENT IN AN ORGANIZED HEALTH CARE EDUCATION/TRAINING PROGRAM

## 2021-01-01 PROCEDURE — 99284 EMERGENCY DEPT VISIT MOD MDM: CPT

## 2021-01-01 PROCEDURE — 86901 BLOOD TYPING SEROLOGIC RH(D): CPT | Performed by: EMERGENCY MEDICINE

## 2021-01-01 PROCEDURE — 82330 ASSAY OF CALCIUM: CPT | Performed by: STUDENT IN AN ORGANIZED HEALTH CARE EDUCATION/TRAINING PROGRAM

## 2021-01-01 PROCEDURE — 83735 ASSAY OF MAGNESIUM: CPT | Performed by: NURSE PRACTITIONER

## 2021-01-01 PROCEDURE — 82330 ASSAY OF CALCIUM: CPT | Performed by: NURSE PRACTITIONER

## 2021-01-01 PROCEDURE — 25010000002 EPINEPHRINE 1 MG/ML SOLUTION 30 ML VIAL: Performed by: ANESTHESIOLOGY

## 2021-01-01 PROCEDURE — 25010000002 ONDANSETRON PER 1 MG: Performed by: NURSE PRACTITIONER

## 2021-01-01 PROCEDURE — 84295 ASSAY OF SERUM SODIUM: CPT

## 2021-01-01 PROCEDURE — 85025 COMPLETE CBC W/AUTO DIFF WBC: CPT | Performed by: HOSPITALIST

## 2021-01-01 PROCEDURE — 88307 TISSUE EXAM BY PATHOLOGIST: CPT | Performed by: SURGERY

## 2021-01-01 PROCEDURE — P9100 PATHOGEN TEST FOR PLATELETS: HCPCS

## 2021-01-01 PROCEDURE — 25010000002 EPINEPHRINE 1 MG/10ML SOLUTION PREFILLED SYRINGE: Performed by: ANESTHESIOLOGY

## 2021-01-01 PROCEDURE — 80053 COMPREHEN METABOLIC PANEL: CPT | Performed by: EMERGENCY MEDICINE

## 2021-01-01 PROCEDURE — 83605 ASSAY OF LACTIC ACID: CPT | Performed by: STUDENT IN AN ORGANIZED HEALTH CARE EDUCATION/TRAINING PROGRAM

## 2021-01-01 PROCEDURE — 80048 BASIC METABOLIC PNL TOTAL CA: CPT | Performed by: HOSPITALIST

## 2021-01-01 PROCEDURE — 25010000002 CALCIUM GLUCONATE-NACL 1-0.675 GM/50ML-% SOLUTION: Performed by: ANESTHESIOLOGY

## 2021-01-01 PROCEDURE — 25010000002 FENTANYL CITRATE (PF) 50 MCG/ML SOLUTION: Performed by: NURSE PRACTITIONER

## 2021-01-01 PROCEDURE — 85007 BL SMEAR W/DIFF WBC COUNT: CPT | Performed by: NURSE PRACTITIONER

## 2021-01-01 PROCEDURE — 25010000002 DOPAMINE PER 40 MG: Performed by: NURSE PRACTITIONER

## 2021-01-01 PROCEDURE — 0 IOPAMIDOL PER 1 ML: Performed by: NURSE PRACTITIONER

## 2021-01-01 PROCEDURE — 85610 PROTHROMBIN TIME: CPT | Performed by: NURSE PRACTITIONER

## 2021-01-01 PROCEDURE — 85384 FIBRINOGEN ACTIVITY: CPT | Performed by: INTERNAL MEDICINE

## 2021-01-01 PROCEDURE — 99232 SBSQ HOSP IP/OBS MODERATE 35: CPT | Performed by: HOSPITALIST

## 2021-01-01 PROCEDURE — 25010000002 MICAFUNGIN SODIUM 100 MG RECONSTITUTED SOLUTION 1 EACH VIAL: Performed by: NURSE PRACTITIONER

## 2021-01-01 PROCEDURE — 25010000002 MORPHINE PER 10 MG: Performed by: STUDENT IN AN ORGANIZED HEALTH CARE EDUCATION/TRAINING PROGRAM

## 2021-01-01 PROCEDURE — C1889 IMPLANT/INSERT DEVICE, NOC: HCPCS | Performed by: SURGERY

## 2021-01-01 PROCEDURE — P9012 CRYOPRECIPITATE EACH UNIT: HCPCS

## 2021-01-01 PROCEDURE — 85025 COMPLETE CBC W/AUTO DIFF WBC: CPT | Performed by: NURSE PRACTITIONER

## 2021-01-01 PROCEDURE — 36600 WITHDRAWAL OF ARTERIAL BLOOD: CPT

## 2021-01-01 PROCEDURE — 86022 PLATELET ANTIBODIES: CPT | Performed by: NURSE PRACTITIONER

## 2021-01-01 PROCEDURE — 25010000002 PIPERACILLIN SOD-TAZOBACTAM PER 1 G: Performed by: INTERNAL MEDICINE

## 2021-01-01 PROCEDURE — 87040 BLOOD CULTURE FOR BACTERIA: CPT | Performed by: NURSE PRACTITIONER

## 2021-01-01 PROCEDURE — 93005 ELECTROCARDIOGRAM TRACING: CPT | Performed by: HOSPITALIST

## 2021-01-01 PROCEDURE — 25010000002 MICAFUNGIN PER 1 MG: Performed by: NURSE PRACTITIONER

## 2021-01-01 PROCEDURE — 85025 COMPLETE CBC W/AUTO DIFF WBC: CPT | Performed by: EMERGENCY MEDICINE

## 2021-01-01 PROCEDURE — 83615 LACTATE (LD) (LDH) ENZYME: CPT | Performed by: NURSE PRACTITIONER

## 2021-01-01 PROCEDURE — 85362 FIBRIN DEGRADATION PRODUCTS: CPT | Performed by: NURSE PRACTITIONER

## 2021-01-01 PROCEDURE — 86140 C-REACTIVE PROTEIN: CPT | Performed by: NURSE PRACTITIONER

## 2021-01-01 PROCEDURE — 85384 FIBRINOGEN ACTIVITY: CPT | Performed by: NURSE PRACTITIONER

## 2021-01-01 PROCEDURE — 25010000002 METHYLPREDNISOLONE PER 40 MG: Performed by: HOSPITALIST

## 2021-01-01 PROCEDURE — 81002 URINALYSIS NONAUTO W/O SCOPE: CPT | Performed by: FAMILY MEDICINE

## 2021-01-01 PROCEDURE — 85025 COMPLETE CBC W/AUTO DIFF WBC: CPT | Performed by: STUDENT IN AN ORGANIZED HEALTH CARE EDUCATION/TRAINING PROGRAM

## 2021-01-01 PROCEDURE — 25010000002 ALBUMIN HUMAN 25% PER 50 ML: Performed by: NURSE PRACTITIONER

## 2021-01-01 PROCEDURE — 86927 PLASMA FRESH FROZEN: CPT

## 2021-01-01 PROCEDURE — 36415 COLL VENOUS BLD VENIPUNCTURE: CPT | Performed by: NURSE PRACTITIONER

## 2021-01-01 PROCEDURE — 94002 VENT MGMT INPAT INIT DAY: CPT

## 2021-01-01 PROCEDURE — P9047 ALBUMIN (HUMAN), 25%, 50ML: HCPCS

## 2021-01-01 PROCEDURE — 25010000002 ALBUMIN HUMAN 25% PER 50 ML: Performed by: INTERNAL MEDICINE

## 2021-01-01 PROCEDURE — 81001 URINALYSIS AUTO W/SCOPE: CPT | Performed by: NURSE PRACTITIONER

## 2021-01-01 PROCEDURE — 99222 1ST HOSP IP/OBS MODERATE 55: CPT | Performed by: HOSPITALIST

## 2021-01-01 PROCEDURE — 86923 COMPATIBILITY TEST ELECTRIC: CPT

## 2021-01-01 PROCEDURE — 80053 COMPREHEN METABOLIC PANEL: CPT | Performed by: NURSE PRACTITIONER

## 2021-01-01 PROCEDURE — 84484 ASSAY OF TROPONIN QUANT: CPT | Performed by: EMERGENCY MEDICINE

## 2021-01-01 PROCEDURE — 86900 BLOOD TYPING SEROLOGIC ABO: CPT

## 2021-01-01 PROCEDURE — P9041 ALBUMIN (HUMAN),5%, 50ML: HCPCS | Performed by: NURSE PRACTITIONER

## 2021-01-01 PROCEDURE — 25010000002 PIPERACILLIN SOD-TAZOBACTAM PER 1 G: Performed by: SURGERY

## 2021-01-01 PROCEDURE — 94003 VENT MGMT INPAT SUBQ DAY: CPT

## 2021-01-01 PROCEDURE — 80051 ELECTROLYTE PANEL: CPT

## 2021-01-01 PROCEDURE — 85007 BL SMEAR W/DIFF WBC COUNT: CPT | Performed by: SURGERY

## 2021-01-01 PROCEDURE — P9040 RBC LEUKOREDUCED IRRADIATED: HCPCS

## 2021-01-01 PROCEDURE — 85007 BL SMEAR W/DIFF WBC COUNT: CPT | Performed by: STUDENT IN AN ORGANIZED HEALTH CARE EDUCATION/TRAINING PROGRAM

## 2021-01-01 PROCEDURE — 87070 CULTURE OTHR SPECIMN AEROBIC: CPT | Performed by: SURGERY

## 2021-01-01 PROCEDURE — 63710000001 AZATHIOPRINE PER 50 MG: Performed by: HOSPITALIST

## 2021-01-01 PROCEDURE — 87205 SMEAR GRAM STAIN: CPT | Performed by: SURGERY

## 2021-01-01 PROCEDURE — 87635 SARS-COV-2 COVID-19 AMP PRB: CPT | Performed by: SURGERY

## 2021-01-01 PROCEDURE — 85379 FIBRIN DEGRADATION QUANT: CPT | Performed by: NURSE PRACTITIONER

## 2021-01-01 PROCEDURE — 25010000002 MORPHINE PER 10 MG: Performed by: SURGERY

## 2021-01-01 PROCEDURE — 25010000002 HYDROCORTISONE SODIUM SUCCINATE 100 MG RECONSTITUTED SOLUTION: Performed by: ANESTHESIOLOGY

## 2021-01-01 PROCEDURE — 0097U HC BIOFIRE FILMARRAY GI PANEL: CPT | Performed by: HOSPITALIST

## 2021-01-01 PROCEDURE — 85610 PROTHROMBIN TIME: CPT | Performed by: EMERGENCY MEDICINE

## 2021-01-01 PROCEDURE — 25010000002 ALBUMIN HUMAN 25% PER 50 ML

## 2021-01-01 PROCEDURE — 44160 REMOVAL OF COLON: CPT | Performed by: SURGERY

## 2021-01-01 PROCEDURE — 25010000002 PHENYLEPHRINE 10 MG/ML SOLUTION 5 ML VIAL: Performed by: ANESTHESIOLOGY

## 2021-01-01 PROCEDURE — 86901 BLOOD TYPING SEROLOGIC RH(D): CPT

## 2021-01-01 PROCEDURE — 83993 ASSAY FOR CALPROTECTIN FECAL: CPT | Performed by: INTERNAL MEDICINE

## 2021-01-01 PROCEDURE — 99285 EMERGENCY DEPT VISIT HI MDM: CPT

## 2021-01-01 PROCEDURE — 84100 ASSAY OF PHOSPHORUS: CPT | Performed by: NURSE PRACTITIONER

## 2021-01-01 PROCEDURE — 45380 COLONOSCOPY AND BIOPSY: CPT | Mod: PT | Performed by: INTERNAL MEDICINE

## 2021-01-01 PROCEDURE — 99233 SBSQ HOSP IP/OBS HIGH 50: CPT | Performed by: INTERNAL MEDICINE

## 2021-01-01 PROCEDURE — 99221 1ST HOSP IP/OBS SF/LOW 40: CPT | Performed by: INTERNAL MEDICINE

## 2021-01-01 PROCEDURE — 74176 CT ABD & PELVIS W/O CONTRAST: CPT

## 2021-01-01 PROCEDURE — 74018 RADEX ABDOMEN 1 VIEW: CPT

## 2021-01-01 PROCEDURE — 99223 1ST HOSP IP/OBS HIGH 75: CPT | Performed by: INTERNAL MEDICINE

## 2021-01-01 PROCEDURE — 5A1945Z RESPIRATORY VENTILATION, 24-96 CONSECUTIVE HOURS: ICD-10-PCS | Performed by: SURGERY

## 2021-01-01 PROCEDURE — 93306 TTE W/DOPPLER COMPLETE: CPT | Performed by: INTERNAL MEDICINE

## 2021-01-01 PROCEDURE — 25010000002 PHENYLEPHRINE 10 MG/ML SOLUTION

## 2021-01-01 PROCEDURE — 87075 CULTR BACTERIA EXCEPT BLOOD: CPT | Performed by: SURGERY

## 2021-01-01 PROCEDURE — 99213 OFFICE O/P EST LOW 20 MIN: CPT | Performed by: INTERNAL MEDICINE

## 2021-01-01 PROCEDURE — 25010000002 HYDROMORPHONE PER 4 MG: Performed by: STUDENT IN AN ORGANIZED HEALTH CARE EDUCATION/TRAINING PROGRAM

## 2021-01-01 PROCEDURE — 99223 1ST HOSP IP/OBS HIGH 75: CPT | Performed by: SURGERY

## 2021-01-01 PROCEDURE — 76706 US ABDL AORTA SCREEN AAA: CPT

## 2021-01-01 PROCEDURE — 85018 HEMOGLOBIN: CPT | Performed by: NURSE PRACTITIONER

## 2021-01-01 PROCEDURE — P9047 ALBUMIN (HUMAN), 25%, 50ML: HCPCS | Performed by: NURSE PRACTITIONER

## 2021-01-01 PROCEDURE — 85730 THROMBOPLASTIN TIME PARTIAL: CPT | Performed by: NURSE PRACTITIONER

## 2021-01-01 PROCEDURE — 36415 COLL VENOUS BLD VENIPUNCTURE: CPT | Performed by: EMERGENCY MEDICINE

## 2021-01-01 PROCEDURE — 25010000002 CALCIUM GLUCONATE PER 10 ML: Performed by: STUDENT IN AN ORGANIZED HEALTH CARE EDUCATION/TRAINING PROGRAM

## 2021-01-01 PROCEDURE — 85025 COMPLETE CBC W/AUTO DIFF WBC: CPT | Performed by: SURGERY

## 2021-01-01 PROCEDURE — 25010000002 MIDAZOLAM PER 1 MG: Performed by: NURSE PRACTITIONER

## 2021-01-01 PROCEDURE — 86901 BLOOD TYPING SEROLOGIC RH(D): CPT | Performed by: NURSE PRACTITIONER

## 2021-01-01 PROCEDURE — 87324 CLOSTRIDIUM AG IA: CPT | Performed by: EMERGENCY MEDICINE

## 2021-01-01 PROCEDURE — 25010000002 PHENYLEPHRINE 10 MG/ML SOLUTION: Performed by: ANESTHESIOLOGY

## 2021-01-01 PROCEDURE — 85014 HEMATOCRIT: CPT

## 2021-01-01 PROCEDURE — 86920 COMPATIBILITY TEST SPIN: CPT

## 2021-01-01 PROCEDURE — 85007 BL SMEAR W/DIFF WBC COUNT: CPT | Performed by: EMERGENCY MEDICINE

## 2021-01-01 PROCEDURE — 25010000002 PIPERACILLIN SOD-TAZOBACTAM PER 1 G: Performed by: ANESTHESIOLOGY

## 2021-01-01 PROCEDURE — C1751 CATH, INF, PER/CENT/MIDLINE: HCPCS

## 2021-01-01 PROCEDURE — 0DTF0ZZ RESECTION OF RIGHT LARGE INTESTINE, OPEN APPROACH: ICD-10-PCS | Performed by: SURGERY

## 2021-01-01 PROCEDURE — 86900 BLOOD TYPING SEROLOGIC ABO: CPT | Performed by: EMERGENCY MEDICINE

## 2021-01-01 PROCEDURE — 25010000002 CALCIUM GLUCONATE PER 10 ML: Performed by: NURSE PRACTITIONER

## 2021-01-01 PROCEDURE — 99238 HOSP IP/OBS DSCHRG MGMT 30/<: CPT | Performed by: HOSPITALIST

## 2021-01-01 PROCEDURE — 86900 BLOOD TYPING SEROLOGIC ABO: CPT | Performed by: NURSE PRACTITIONER

## 2021-01-01 PROCEDURE — 25010000002 ALBUMIN HUMAN 5% PER 50 ML: Performed by: NURSE PRACTITIONER

## 2021-01-01 PROCEDURE — 0097U HC BIOFIRE FILMARRAY GI PANEL: CPT | Performed by: NURSE PRACTITIONER

## 2021-01-01 PROCEDURE — 85670 THROMBIN TIME PLASMA: CPT | Performed by: NURSE PRACTITIONER

## 2021-01-01 PROCEDURE — 87449 NOS EACH ORGANISM AG IA: CPT | Performed by: EMERGENCY MEDICINE

## 2021-01-01 PROCEDURE — 02HV33Z INSERTION OF INFUSION DEVICE INTO SUPERIOR VENA CAVA, PERCUTANEOUS APPROACH: ICD-10-PCS | Performed by: EMERGENCY MEDICINE

## 2021-01-01 PROCEDURE — 83690 ASSAY OF LIPASE: CPT | Performed by: NURSE PRACTITIONER

## 2021-01-01 PROCEDURE — 82947 ASSAY GLUCOSE BLOOD QUANT: CPT

## 2021-01-01 DEVICE — PROXIMATE LINEAR CUTTER RELOAD, BLUE, 75MM
Type: IMPLANTABLE DEVICE | Site: ABDOMEN | Status: FUNCTIONAL
Brand: PROXIMATE

## 2021-01-01 DEVICE — SEAL HEMO SURG ARISTA/AH ABS/PWDR 3GM: Type: IMPLANTABLE DEVICE | Site: ABDOMEN | Status: FUNCTIONAL

## 2021-01-01 DEVICE — FLOSEAL HEMOSTATIC MATRIX, 10ML
Type: IMPLANTABLE DEVICE | Site: ABDOMEN | Status: FUNCTIONAL
Brand: FLOSEAL HEMOSTATIC MATRIX

## 2021-01-01 DEVICE — PROXIMATE RELOADABLE LINEAR CUTTER WITH SAFETY LOCK-OUT, 75MM
Type: IMPLANTABLE DEVICE | Site: ABDOMEN | Status: FUNCTIONAL
Brand: PROXIMATE

## 2021-01-01 RX ORDER — HYDROCODONE BITARTRATE AND ACETAMINOPHEN 5; 325 MG/1; MG/1
1 TABLET ORAL ONCE AS NEEDED
Status: DISCONTINUED | OUTPATIENT
Start: 2021-01-01 | End: 2021-01-01 | Stop reason: HOSPADM

## 2021-01-01 RX ORDER — LANOLIN ALCOHOL/MO/W.PET/CERES
800 CREAM (GRAM) TOPICAL DAILY
Status: DISCONTINUED | OUTPATIENT
Start: 2021-01-01 | End: 2021-01-01 | Stop reason: HOSPADM

## 2021-01-01 RX ORDER — PANTOPRAZOLE SODIUM 40 MG/10ML
40 INJECTION, POWDER, LYOPHILIZED, FOR SOLUTION INTRAVENOUS ONCE
Status: COMPLETED | OUTPATIENT
Start: 2021-01-01 | End: 2021-01-01

## 2021-01-01 RX ORDER — PANTOPRAZOLE SODIUM 40 MG/10ML
80 INJECTION, POWDER, LYOPHILIZED, FOR SOLUTION INTRAVENOUS ONCE
Status: COMPLETED | OUTPATIENT
Start: 2021-01-01 | End: 2021-01-01

## 2021-01-01 RX ORDER — AZATHIOPRINE 75 MG/1
75 TABLET ORAL DAILY
Qty: 30 TABLET | Refills: 5 | Status: SHIPPED | OUTPATIENT
Start: 2021-01-01

## 2021-01-01 RX ORDER — DEXTROSE AND SODIUM CHLORIDE 5; .9 G/100ML; G/100ML
150 INJECTION, SOLUTION INTRAVENOUS CONTINUOUS
Status: DISCONTINUED | OUTPATIENT
Start: 2021-01-01 | End: 2021-01-01 | Stop reason: HOSPADM

## 2021-01-01 RX ORDER — IBUPROFEN 400 MG/1
400 TABLET ORAL EVERY 8 HOURS PRN
Status: DISCONTINUED | OUTPATIENT
Start: 2021-01-01 | End: 2021-01-01 | Stop reason: HOSPADM

## 2021-01-01 RX ORDER — SACCHAROMYCES BOULARDII 250 MG
250 CAPSULE ORAL 2 TIMES DAILY
Qty: 28 CAPSULE | Refills: 0 | Status: ON HOLD | OUTPATIENT
Start: 2021-01-01 | End: 2021-01-01

## 2021-01-01 RX ORDER — CHOLECALCIFEROL (VITAMIN D3) 125 MCG
5 CAPSULE ORAL NIGHTLY PRN
Status: DISCONTINUED | OUTPATIENT
Start: 2021-01-01 | End: 2021-01-01 | Stop reason: HOSPADM

## 2021-01-01 RX ORDER — ETOMIDATE 2 MG/ML
INJECTION INTRAVENOUS AS NEEDED
Status: DISCONTINUED | OUTPATIENT
Start: 2021-01-01 | End: 2021-01-01 | Stop reason: SURG

## 2021-01-01 RX ORDER — ALBUMIN (HUMAN) 12.5 G/50ML
25 SOLUTION INTRAVENOUS ONCE
Status: COMPLETED | OUTPATIENT
Start: 2021-01-01 | End: 2021-01-01

## 2021-01-01 RX ORDER — ALBUMIN, HUMAN INJ 5% 5 %
500 SOLUTION INTRAVENOUS ONCE
Status: COMPLETED | OUTPATIENT
Start: 2021-01-01 | End: 2021-01-01

## 2021-01-01 RX ORDER — ACETAMINOPHEN 650 MG/1
650 SUPPOSITORY RECTAL ONCE AS NEEDED
Status: DISCONTINUED | OUTPATIENT
Start: 2021-01-01 | End: 2021-01-01 | Stop reason: HOSPADM

## 2021-01-01 RX ORDER — TRAMADOL HYDROCHLORIDE 50 MG/1
50 TABLET ORAL EVERY 6 HOURS PRN
Status: DISCONTINUED | OUTPATIENT
Start: 2021-01-01 | End: 2021-01-01 | Stop reason: HOSPADM

## 2021-01-01 RX ORDER — PREDNISONE 10 MG/1
10 TABLET ORAL DAILY
Qty: 30 TABLET | Refills: 0 | Status: ON HOLD | OUTPATIENT
Start: 2021-01-01 | End: 2021-01-01

## 2021-01-01 RX ORDER — SODIUM CHLORIDE 9 MG/ML
100 INJECTION, SOLUTION INTRAVENOUS CONTINUOUS
Status: DISCONTINUED | OUTPATIENT
Start: 2021-01-01 | End: 2021-01-01

## 2021-01-01 RX ORDER — NOREPINEPHRINE BIT/0.9 % NACL 8 MG/250ML
.02-.3 INFUSION BOTTLE (ML) INTRAVENOUS
Status: DISCONTINUED | OUTPATIENT
Start: 2021-01-01 | End: 2021-01-01

## 2021-01-01 RX ORDER — SODIUM CHLORIDE 0.9 % (FLUSH) 0.9 %
10 SYRINGE (ML) INJECTION AS NEEDED
Status: DISCONTINUED | OUTPATIENT
Start: 2021-01-01 | End: 2021-01-01 | Stop reason: HOSPADM

## 2021-01-01 RX ORDER — ACETAMINOPHEN 325 MG/1
650 TABLET ORAL ONCE AS NEEDED
Status: DISCONTINUED | OUTPATIENT
Start: 2021-01-01 | End: 2021-01-01 | Stop reason: HOSPADM

## 2021-01-01 RX ORDER — ALBUMIN (HUMAN) 12.5 G/50ML
50 SOLUTION INTRAVENOUS ONCE
Status: COMPLETED | OUTPATIENT
Start: 2021-01-01 | End: 2021-01-01

## 2021-01-01 RX ORDER — HYDROMORPHONE HCL 110MG/55ML
2 PATIENT CONTROLLED ANALGESIA SYRINGE INTRAVENOUS
Status: DISCONTINUED | OUTPATIENT
Start: 2021-01-01 | End: 2021-01-01 | Stop reason: HOSPADM

## 2021-01-01 RX ORDER — PROMETHAZINE HYDROCHLORIDE 12.5 MG/1
12.5 TABLET ORAL EVERY 6 HOURS PRN
Status: DISCONTINUED | OUTPATIENT
Start: 2021-01-01 | End: 2021-01-01 | Stop reason: HOSPADM

## 2021-01-01 RX ORDER — MULTIPLE VITAMINS W/ MINERALS TAB 9MG-400MCG
1 TAB ORAL DAILY
Status: DISCONTINUED | OUTPATIENT
Start: 2021-01-01 | End: 2021-01-01 | Stop reason: HOSPADM

## 2021-01-01 RX ORDER — ONDANSETRON 4 MG/1
4 TABLET, FILM COATED ORAL EVERY 6 HOURS PRN
Status: DISCONTINUED | OUTPATIENT
Start: 2021-01-01 | End: 2021-01-01 | Stop reason: HOSPADM

## 2021-01-01 RX ORDER — HYDROMORPHONE HCL 110MG/55ML
1 PATIENT CONTROLLED ANALGESIA SYRINGE INTRAVENOUS ONCE
Status: COMPLETED | OUTPATIENT
Start: 2021-01-01 | End: 2021-01-01

## 2021-01-01 RX ORDER — SODIUM CHLORIDE 9 MG/ML
150 INJECTION, SOLUTION INTRAVENOUS CONTINUOUS
Status: DISCONTINUED | OUTPATIENT
Start: 2021-01-01 | End: 2021-01-01

## 2021-01-01 RX ORDER — CHOLESTYRAMINE LIGHT 4 G/5.7G
1 POWDER, FOR SUSPENSION ORAL DAILY
Status: CANCELLED | OUTPATIENT
Start: 2021-01-01

## 2021-01-01 RX ORDER — METHYLPREDNISOLONE SODIUM SUCCINATE 40 MG/ML
40 INJECTION, POWDER, LYOPHILIZED, FOR SOLUTION INTRAMUSCULAR; INTRAVENOUS EVERY 8 HOURS
Status: DISCONTINUED | OUTPATIENT
Start: 2021-01-01 | End: 2021-01-01 | Stop reason: HOSPADM

## 2021-01-01 RX ORDER — MIDAZOLAM HCL IN 0.9 % NACL/PF 1 MG/ML
1 PLASTIC BAG, INJECTION (ML) INTRAVENOUS
Status: DISCONTINUED | OUTPATIENT
Start: 2021-01-01 | End: 2021-01-01 | Stop reason: HOSPADM

## 2021-01-01 RX ORDER — AZATHIOPRINE 50 MG/1
75 TABLET ORAL
Qty: 135 | Refills: 3 | Status: ACTIVE
Start: 2019-04-03

## 2021-01-01 RX ORDER — HYDROMORPHONE HCL 110MG/55ML
0.5 PATIENT CONTROLLED ANALGESIA SYRINGE INTRAVENOUS
Status: DISCONTINUED | OUTPATIENT
Start: 2021-01-01 | End: 2021-01-01

## 2021-01-01 RX ORDER — EPINEPHRINE 0.1 MG/ML
SYRINGE (ML) INJECTION AS NEEDED
Status: DISCONTINUED | OUTPATIENT
Start: 2021-01-01 | End: 2021-01-01 | Stop reason: SURG

## 2021-01-01 RX ORDER — ONDANSETRON 2 MG/ML
4 INJECTION INTRAMUSCULAR; INTRAVENOUS ONCE AS NEEDED
Status: DISCONTINUED | OUTPATIENT
Start: 2021-01-01 | End: 2021-01-01 | Stop reason: HOSPADM

## 2021-01-01 RX ORDER — PROMETHAZINE HYDROCHLORIDE 12.5 MG/1
12.5 SUPPOSITORY RECTAL EVERY 6 HOURS PRN
Status: DISCONTINUED | OUTPATIENT
Start: 2021-01-01 | End: 2021-01-01 | Stop reason: HOSPADM

## 2021-01-01 RX ORDER — ACETAMINOPHEN 650 MG/1
650 SUPPOSITORY RECTAL EVERY 4 HOURS PRN
Status: DISCONTINUED | OUTPATIENT
Start: 2021-01-01 | End: 2021-01-01 | Stop reason: HOSPADM

## 2021-01-01 RX ORDER — ROCURONIUM BROMIDE 10 MG/ML
INJECTION, SOLUTION INTRAVENOUS AS NEEDED
Status: DISCONTINUED | OUTPATIENT
Start: 2021-01-01 | End: 2021-01-01 | Stop reason: SURG

## 2021-01-01 RX ORDER — ONDANSETRON 2 MG/ML
4 INJECTION INTRAMUSCULAR; INTRAVENOUS EVERY 6 HOURS PRN
Status: DISCONTINUED | OUTPATIENT
Start: 2021-01-01 | End: 2021-01-01 | Stop reason: HOSPADM

## 2021-01-01 RX ORDER — DOPAMINE HYDROCHLORIDE 160 MG/100ML
2-20 INJECTION, SOLUTION INTRAVENOUS
Status: DISCONTINUED | OUTPATIENT
Start: 2021-01-01 | End: 2021-01-01 | Stop reason: HOSPADM

## 2021-01-01 RX ORDER — FENTANYL CITRATE 50 UG/ML
25 INJECTION, SOLUTION INTRAMUSCULAR; INTRAVENOUS
Status: DISCONTINUED | OUTPATIENT
Start: 2021-01-01 | End: 2021-01-01

## 2021-01-01 RX ORDER — COLESEVELAM 180 1/1
1875 TABLET ORAL 2 TIMES DAILY WITH MEALS
COMMUNITY

## 2021-01-01 RX ORDER — MIDAZOLAM HYDROCHLORIDE 1 MG/ML
1 INJECTION INTRAMUSCULAR; INTRAVENOUS
Status: DISCONTINUED | OUTPATIENT
Start: 2021-01-01 | End: 2021-01-01 | Stop reason: HOSPADM

## 2021-01-01 RX ORDER — POTASSIUM CHLORIDE 20 MEQ/1
40 TABLET, EXTENDED RELEASE ORAL AS NEEDED
Status: DISCONTINUED | OUTPATIENT
Start: 2021-01-01 | End: 2021-01-01 | Stop reason: HOSPADM

## 2021-01-01 RX ORDER — NALOXONE HCL 0.4 MG/ML
0.4 VIAL (ML) INJECTION
Status: DISCONTINUED | OUTPATIENT
Start: 2021-01-01 | End: 2021-01-01

## 2021-01-01 RX ORDER — ACETAMINOPHEN 325 MG/1
650 TABLET ORAL EVERY 4 HOURS PRN
Status: DISCONTINUED | OUTPATIENT
Start: 2021-01-01 | End: 2021-01-01 | Stop reason: HOSPADM

## 2021-01-01 RX ORDER — MEPERIDINE HYDROCHLORIDE 25 MG/ML
12.5 INJECTION INTRAMUSCULAR; INTRAVENOUS; SUBCUTANEOUS
Status: DISCONTINUED | OUTPATIENT
Start: 2021-01-01 | End: 2021-01-01 | Stop reason: HOSPADM

## 2021-01-01 RX ORDER — DEXTROSE MONOHYDRATE 25 G/50ML
INJECTION, SOLUTION INTRAVENOUS
Status: COMPLETED
Start: 2021-01-01 | End: 2021-01-01

## 2021-01-01 RX ORDER — MORPHINE SULFATE 4 MG/ML
4 INJECTION, SOLUTION INTRAMUSCULAR; INTRAVENOUS
Status: DISCONTINUED | OUTPATIENT
Start: 2021-01-01 | End: 2021-01-01

## 2021-01-01 RX ORDER — SODIUM CHLORIDE 0.9 % (FLUSH) 0.9 %
10 SYRINGE (ML) INJECTION EVERY 12 HOURS SCHEDULED
Status: DISCONTINUED | OUTPATIENT
Start: 2021-01-01 | End: 2021-01-01 | Stop reason: HOSPADM

## 2021-01-01 RX ORDER — CHOLESTYRAMINE LIGHT 4 G/5.7G
1 POWDER, FOR SUSPENSION ORAL EVERY 8 HOURS SCHEDULED
Status: DISCONTINUED | OUTPATIENT
Start: 2021-01-01 | End: 2021-01-01 | Stop reason: HOSPADM

## 2021-01-01 RX ORDER — VANCOMYCIN HYDROCHLORIDE 250 MG/1
500 CAPSULE ORAL EVERY 6 HOURS SCHEDULED
Qty: 20 CAPSULE | Refills: 0 | Status: SHIPPED | OUTPATIENT
Start: 2021-01-01 | End: 2021-01-01

## 2021-01-01 RX ORDER — BACLOFEN 5 MG/1
5-10 TABLET ORAL 3 TIMES DAILY PRN
Qty: 180 TABLET | Refills: 2 | Status: SHIPPED | OUTPATIENT
Start: 2021-01-01 | End: 2021-01-01

## 2021-01-01 RX ORDER — PANTOPRAZOLE SODIUM 40 MG/10ML
40 INJECTION, POWDER, LYOPHILIZED, FOR SOLUTION INTRAVENOUS
Status: DISCONTINUED | OUTPATIENT
Start: 2021-01-01 | End: 2021-01-01 | Stop reason: HOSPADM

## 2021-01-01 RX ORDER — SODIUM CHLORIDE 9 MG/ML
50 INJECTION, SOLUTION INTRAVENOUS CONTINUOUS
Status: DISCONTINUED | OUTPATIENT
Start: 2021-01-01 | End: 2021-01-01 | Stop reason: HOSPADM

## 2021-01-01 RX ORDER — CALCIUM GLUCONATE 20 MG/ML
1 INJECTION, SOLUTION INTRAVENOUS ONCE
Status: COMPLETED | OUTPATIENT
Start: 2021-01-01 | End: 2021-01-01

## 2021-01-01 RX ORDER — MAGNESIUM SULFATE 1 G/100ML
1 INJECTION INTRAVENOUS AS NEEDED
Status: DISCONTINUED | OUTPATIENT
Start: 2021-01-01 | End: 2021-01-01 | Stop reason: HOSPADM

## 2021-01-01 RX ORDER — POTASSIUM CHLORIDE 1.5 G/1.77G
40 POWDER, FOR SOLUTION ORAL AS NEEDED
Status: DISCONTINUED | OUTPATIENT
Start: 2021-01-01 | End: 2021-01-01 | Stop reason: HOSPADM

## 2021-01-01 RX ORDER — ALBUMIN (HUMAN) 12.5 G/50ML
SOLUTION INTRAVENOUS
Status: COMPLETED
Start: 2021-01-01 | End: 2021-01-01

## 2021-01-01 RX ORDER — PIPERACILLIN SODIUM, TAZOBACTAM SODIUM 3; .375 G/15ML; G/15ML
INJECTION, POWDER, LYOPHILIZED, FOR SOLUTION INTRAVENOUS AS NEEDED
Status: DISCONTINUED | OUTPATIENT
Start: 2021-01-01 | End: 2021-01-01 | Stop reason: SURG

## 2021-01-01 RX ORDER — SODIUM CHLORIDE 9 MG/ML
INJECTION, SOLUTION INTRAVENOUS CONTINUOUS PRN
Status: DISCONTINUED | OUTPATIENT
Start: 2021-01-01 | End: 2021-01-01 | Stop reason: SURG

## 2021-01-01 RX ORDER — MAGNESIUM SULFATE HEPTAHYDRATE 40 MG/ML
2 INJECTION, SOLUTION INTRAVENOUS AS NEEDED
Status: DISCONTINUED | OUTPATIENT
Start: 2021-01-01 | End: 2021-01-01 | Stop reason: HOSPADM

## 2021-01-01 RX ORDER — FENTANYL CITRATE 50 UG/ML
50 INJECTION, SOLUTION INTRAMUSCULAR; INTRAVENOUS ONCE
Status: COMPLETED | OUTPATIENT
Start: 2021-01-01 | End: 2021-01-01

## 2021-01-01 RX ORDER — PHENYLEPHRINE HYDROCHLORIDE 10 MG/ML
INJECTION INTRAVENOUS AS NEEDED
Status: DISCONTINUED | OUTPATIENT
Start: 2021-01-01 | End: 2021-01-01 | Stop reason: SURG

## 2021-01-01 RX ORDER — FENTANYL CITRATE 50 UG/ML
25 INJECTION, SOLUTION INTRAMUSCULAR; INTRAVENOUS
Status: DISCONTINUED | OUTPATIENT
Start: 2021-01-01 | End: 2021-01-01 | Stop reason: HOSPADM

## 2021-01-01 RX ORDER — ONDANSETRON 2 MG/ML
4 INJECTION INTRAMUSCULAR; INTRAVENOUS ONCE
Status: COMPLETED | OUTPATIENT
Start: 2021-01-01 | End: 2021-01-01

## 2021-01-01 RX ORDER — VANCOMYCIN HYDROCHLORIDE 250 MG/1
500 CAPSULE ORAL EVERY 6 HOURS SCHEDULED
Status: DISCONTINUED | OUTPATIENT
Start: 2021-01-01 | End: 2021-01-01 | Stop reason: HOSPADM

## 2021-01-01 RX ORDER — SACCHAROMYCES BOULARDII 250 MG
250 CAPSULE ORAL 2 TIMES DAILY
Status: DISCONTINUED | OUTPATIENT
Start: 2021-01-01 | End: 2021-01-01 | Stop reason: HOSPADM

## 2021-01-01 RX ORDER — VANCOMYCIN HYDROCHLORIDE 125 MG/1
125 CAPSULE ORAL EVERY 6 HOURS SCHEDULED
Status: DISCONTINUED | OUTPATIENT
Start: 2021-01-01 | End: 2021-01-01

## 2021-01-01 RX ORDER — NALOXONE HCL 0.4 MG/ML
0.4 VIAL (ML) INJECTION AS NEEDED
Status: DISCONTINUED | OUTPATIENT
Start: 2021-01-01 | End: 2021-01-01 | Stop reason: HOSPADM

## 2021-01-01 RX ORDER — MIDAZOLAM HYDROCHLORIDE 1 MG/ML
2 INJECTION INTRAMUSCULAR; INTRAVENOUS ONCE
Status: COMPLETED | OUTPATIENT
Start: 2021-01-01 | End: 2021-01-01

## 2021-01-01 RX ORDER — MORPHINE SULFATE 4 MG/ML
4 INJECTION, SOLUTION INTRAMUSCULAR; INTRAVENOUS
Status: DISCONTINUED | OUTPATIENT
Start: 2021-01-01 | End: 2021-01-01 | Stop reason: HOSPADM

## 2021-01-01 RX ORDER — KETOROLAC TROMETHAMINE 30 MG/ML
15 INJECTION, SOLUTION INTRAMUSCULAR; INTRAVENOUS EVERY 6 HOURS PRN
Status: DISCONTINUED | OUTPATIENT
Start: 2021-01-01 | End: 2021-01-01 | Stop reason: HOSPADM

## 2021-01-01 RX ORDER — PREDNISONE 50 MG/1
40 TABLET ORAL DAILY
Status: ON HOLD | COMMUNITY
End: 2021-01-01

## 2021-01-01 RX ORDER — POTASSIUM CHLORIDE 7.45 MG/ML
10 INJECTION INTRAVENOUS
Status: DISCONTINUED | OUTPATIENT
Start: 2021-01-01 | End: 2021-01-01 | Stop reason: HOSPADM

## 2021-01-01 RX ORDER — METRONIDAZOLE 500 MG/1
500 TABLET ORAL EVERY 8 HOURS SCHEDULED
Status: DISCONTINUED | OUTPATIENT
Start: 2021-01-01 | End: 2021-01-01 | Stop reason: HOSPADM

## 2021-01-01 RX ORDER — LORAZEPAM 2 MG/ML
0.5 INJECTION INTRAMUSCULAR
Status: DISCONTINUED | OUTPATIENT
Start: 2021-01-01 | End: 2021-01-01 | Stop reason: HOSPADM

## 2021-01-01 RX ORDER — NALOXONE HCL 0.4 MG/ML
0.4 VIAL (ML) INJECTION
Status: DISCONTINUED | OUTPATIENT
Start: 2021-01-01 | End: 2021-01-01 | Stop reason: HOSPADM

## 2021-01-01 RX ORDER — GABAPENTIN 300 MG/1
300 CAPSULE ORAL 3 TIMES DAILY
Qty: 90 CAPSULE | Refills: 2 | Status: SHIPPED | OUTPATIENT
Start: 2021-01-01

## 2021-01-01 RX ORDER — NITROGLYCERIN 0.4 MG/1
0.4 TABLET SUBLINGUAL
Status: DISCONTINUED | OUTPATIENT
Start: 2021-01-01 | End: 2021-01-01 | Stop reason: HOSPADM

## 2021-01-01 RX ORDER — EPINEPHRINE 0.1 MG/ML
0.5 SYRINGE (ML) INJECTION
Status: DISCONTINUED | OUTPATIENT
Start: 2021-01-01 | End: 2021-01-01

## 2021-01-01 RX ORDER — VANCOMYCIN HYDROCHLORIDE 250 MG/1
500 CAPSULE ORAL EVERY 6 HOURS SCHEDULED
Qty: 62 CAPSULE | Refills: 0 | Status: SHIPPED | OUTPATIENT
Start: 2021-01-01 | End: 2021-01-01 | Stop reason: SDUPTHER

## 2021-01-01 RX ORDER — ALUMINA, MAGNESIA, AND SIMETHICONE 2400; 2400; 240 MG/30ML; MG/30ML; MG/30ML
15 SUSPENSION ORAL EVERY 6 HOURS PRN
Status: DISCONTINUED | OUTPATIENT
Start: 2021-01-01 | End: 2021-01-01 | Stop reason: HOSPADM

## 2021-01-01 RX ORDER — NOREPINEPHRINE BIT/0.9 % NACL 8 MG/250ML
INFUSION BOTTLE (ML) INTRAVENOUS CONTINUOUS PRN
Status: DISCONTINUED | OUTPATIENT
Start: 2021-01-01 | End: 2021-01-01 | Stop reason: SURG

## 2021-01-01 RX ORDER — FLUMAZENIL 0.1 MG/ML
0.5 INJECTION INTRAVENOUS AS NEEDED
Status: DISCONTINUED | OUTPATIENT
Start: 2021-01-01 | End: 2021-01-01 | Stop reason: HOSPADM

## 2021-01-01 RX ORDER — HEPARIN SODIUM 5000 [USP'U]/ML
5000 INJECTION, SOLUTION INTRAVENOUS; SUBCUTANEOUS EVERY 12 HOURS SCHEDULED
Status: DISCONTINUED | OUTPATIENT
Start: 2021-01-01 | End: 2021-01-01 | Stop reason: HOSPADM

## 2021-01-01 RX ORDER — PROMETHAZINE HYDROCHLORIDE 25 MG/1
25 TABLET ORAL ONCE AS NEEDED
Status: DISCONTINUED | OUTPATIENT
Start: 2021-01-01 | End: 2021-01-01 | Stop reason: HOSPADM

## 2021-01-01 RX ORDER — OMEPRAZOLE 40 MG/1
40 CAPSULE, DELAYED RELEASE ORAL DAILY
Qty: 30 CAPSULE | Refills: 2 | Status: SHIPPED | OUTPATIENT
Start: 2021-01-01

## 2021-01-01 RX ORDER — COLESEVELAM 180 1/1
1875 TABLET ORAL 2 TIMES DAILY WITH MEALS
Status: ON HOLD | COMMUNITY
End: 2021-01-01

## 2021-01-01 RX ORDER — CALCIUM CHLORIDE 100 MG/ML
INJECTION INTRAVENOUS; INTRAVENTRICULAR AS NEEDED
Status: DISCONTINUED | OUTPATIENT
Start: 2021-01-01 | End: 2021-01-01 | Stop reason: SURG

## 2021-01-01 RX ORDER — MESALAMINE 1.2 G/1
TABLET, DELAYED RELEASE ORAL
Qty: 360 | Refills: 3 | Status: ACTIVE

## 2021-01-01 RX ORDER — METRONIDAZOLE 500 MG/1
500 TABLET ORAL EVERY 8 HOURS SCHEDULED
Qty: 18 TABLET | Refills: 0 | Status: SHIPPED | OUTPATIENT
Start: 2021-01-01 | End: 2021-01-01

## 2021-01-01 RX ORDER — HYDROCODONE BITARTRATE AND ACETAMINOPHEN 5; 325 MG/1; MG/1
1 TABLET ORAL EVERY 4 HOURS PRN
Status: DISCONTINUED | OUTPATIENT
Start: 2021-01-01 | End: 2021-01-01

## 2021-01-01 RX ORDER — SACCHAROMYCES BOULARDII 250 MG
250 CAPSULE ORAL 2 TIMES DAILY
Qty: 28 CAPSULE | Refills: 0 | Status: SHIPPED | OUTPATIENT
Start: 2021-01-01

## 2021-01-01 RX ORDER — ACETAMINOPHEN 160 MG/5ML
650 SOLUTION ORAL EVERY 4 HOURS PRN
Status: DISCONTINUED | OUTPATIENT
Start: 2021-01-01 | End: 2021-01-01 | Stop reason: HOSPADM

## 2021-01-01 RX ORDER — MESALAMINE 1.2 G/1
4800 TABLET, DELAYED RELEASE ORAL DAILY
Status: DISCONTINUED | OUTPATIENT
Start: 2021-01-01 | End: 2021-01-01 | Stop reason: HOSPADM

## 2021-01-01 RX ORDER — CHOLESTYRAMINE LIGHT 4 G/5.7G
1 POWDER, FOR SUSPENSION ORAL EVERY 8 HOURS PRN
Qty: 30 PACKET | Refills: 0 | Status: ON HOLD | OUTPATIENT
Start: 2021-01-01 | End: 2021-01-01

## 2021-01-01 RX ORDER — AZATHIOPRINE 50 MG/1
75 TABLET ORAL DAILY
Status: DISCONTINUED | OUTPATIENT
Start: 2021-01-01 | End: 2021-01-01 | Stop reason: HOSPADM

## 2021-01-01 RX ORDER — VARDENAFIL HYDROCHLORIDE 20 MG/1
20 TABLET ORAL DAILY PRN
COMMUNITY

## 2021-01-01 RX ORDER — MORPHINE SULFATE 4 MG/ML
2 INJECTION, SOLUTION INTRAMUSCULAR; INTRAVENOUS
Status: ACTIVE | OUTPATIENT
Start: 2021-01-01 | End: 2021-01-01

## 2021-01-01 RX ORDER — PROMETHAZINE HYDROCHLORIDE 25 MG/1
25 SUPPOSITORY RECTAL ONCE AS NEEDED
Status: DISCONTINUED | OUTPATIENT
Start: 2021-01-01 | End: 2021-01-01 | Stop reason: HOSPADM

## 2021-01-01 RX ADMIN — CHOLESTYRAMINE 4 G: 4 POWDER, FOR SUSPENSION ORAL at 08:15

## 2021-01-01 RX ADMIN — CALCIUM GLUCONATE 3 G: 98 INJECTION, SOLUTION INTRAVENOUS at 04:44

## 2021-01-01 RX ADMIN — METRONIDAZOLE 500 MG: 500 INJECTION, SOLUTION INTRAVENOUS at 12:16

## 2021-01-01 RX ADMIN — CALCIUM CHLORIDE 0.25 G: 100 INJECTION INTRAVENOUS; INTRAVENTRICULAR at 05:43

## 2021-01-01 RX ADMIN — METHYLPREDNISOLONE SODIUM SUCCINATE 40 MG: 40 INJECTION, POWDER, FOR SOLUTION INTRAMUSCULAR; INTRAVENOUS at 10:08

## 2021-01-01 RX ADMIN — VASOPRESSIN 0.03 UNITS/MIN: 20 INJECTION INTRAVENOUS at 06:50

## 2021-01-01 RX ADMIN — PANTOPRAZOLE SODIUM 40 MG: 40 INJECTION, POWDER, FOR SOLUTION INTRAVENOUS at 04:44

## 2021-01-01 RX ADMIN — EPINEPHRINE 1 MG: 0.1 INJECTION INTRACARDIAC; INTRAVENOUS at 11:13

## 2021-01-01 RX ADMIN — VANCOMYCIN HYDROCHLORIDE 500 MG: 250 CAPSULE ORAL at 05:40

## 2021-01-01 RX ADMIN — SODIUM CHLORIDE 100 ML/HR: 9 INJECTION, SOLUTION INTRAVENOUS at 23:57

## 2021-01-01 RX ADMIN — FIDAXOMICIN 200 MG: 200 TABLET, FILM COATED ORAL at 08:52

## 2021-01-01 RX ADMIN — EPINEPHRINE 0.5 MCG/KG/MIN: 1 INJECTION INTRAMUSCULAR; INTRAVENOUS; SUBCUTANEOUS at 08:32

## 2021-01-01 RX ADMIN — VASOPRESSIN 0.03 UNITS/MIN: 20 INJECTION INTRAVENOUS at 19:17

## 2021-01-01 RX ADMIN — SODIUM CHLORIDE 100 ML/HR: 9 INJECTION, SOLUTION INTRAVENOUS at 17:10

## 2021-01-01 RX ADMIN — PHENYLEPHRINE HYDROCHLORIDE 6 MCG/KG/MIN: 10 INJECTION INTRAVENOUS at 02:06

## 2021-01-01 RX ADMIN — EPINEPHRINE 1 MG: 0.1 INJECTION INTRACARDIAC; INTRAVENOUS at 10:38

## 2021-01-01 RX ADMIN — ALBUMIN (HUMAN) 500 ML: 12.5 INJECTION, SOLUTION INTRAVENOUS at 16:30

## 2021-01-01 RX ADMIN — PHENYLEPHRINE HYDROCHLORIDE 6 MCG/KG/MIN: 10 INJECTION INTRAVENOUS at 12:53

## 2021-01-01 RX ADMIN — DEXTROSE MONOHYDRATE 50 ML: 500 INJECTION PARENTERAL at 06:38

## 2021-01-01 RX ADMIN — METHYLPREDNISOLONE SODIUM SUCCINATE 40 MG: 40 INJECTION, POWDER, FOR SOLUTION INTRAMUSCULAR; INTRAVENOUS at 08:57

## 2021-01-01 RX ADMIN — SODIUM CHLORIDE 999 ML/HR: 0.9 INJECTION, SOLUTION INTRAVENOUS at 09:00

## 2021-01-01 RX ADMIN — METRONIDAZOLE 500 MG: 500 TABLET ORAL at 13:42

## 2021-01-01 RX ADMIN — PANTOPRAZOLE SODIUM 40 MG: 40 INJECTION, POWDER, FOR SOLUTION INTRAVENOUS at 05:30

## 2021-01-01 RX ADMIN — PHENYLEPHRINE HYDROCHLORIDE 2 MCG/KG/MIN: 10 INJECTION INTRAVENOUS at 06:13

## 2021-01-01 RX ADMIN — PHENYLEPHRINE HYDROCHLORIDE 3000 MCG: 10 INJECTION INTRAVENOUS at 06:25

## 2021-01-01 RX ADMIN — DOPAMINE HYDROCHLORIDE IN DEXTROSE 5 MCG/KG/MIN: 1.6 INJECTION, SOLUTION INTRAVENOUS at 09:45

## 2021-01-01 RX ADMIN — HEPARIN SODIUM 5000 UNITS: 5000 INJECTION INTRAVENOUS; SUBCUTANEOUS at 08:52

## 2021-01-01 RX ADMIN — EPINEPHRINE 0.5 MCG/KG/MIN: 1 INJECTION INTRAMUSCULAR; INTRAVENOUS; SUBCUTANEOUS at 21:48

## 2021-01-01 RX ADMIN — SODIUM CHLORIDE, PRESERVATIVE FREE 10 ML: 5 INJECTION INTRAVENOUS at 21:34

## 2021-01-01 RX ADMIN — SODIUM BICARBONATE: 84 INJECTION, SOLUTION INTRAVENOUS at 17:13

## 2021-01-01 RX ADMIN — AZATHIOPRINE 75 MG: 50 TABLET ORAL at 12:07

## 2021-01-01 RX ADMIN — DOPAMINE HYDROCHLORIDE IN DEXTROSE 20 MCG/KG/MIN: 1.6 INJECTION, SOLUTION INTRAVENOUS at 15:29

## 2021-01-01 RX ADMIN — DOPAMINE HYDROCHLORIDE IN DEXTROSE 20 MCG/KG/MIN: 1.6 INJECTION, SOLUTION INTRAVENOUS at 19:17

## 2021-01-01 RX ADMIN — EPINEPHRINE 0.5 MCG/KG/MIN: 1 INJECTION INTRAMUSCULAR; INTRAVENOUS; SUBCUTANEOUS at 07:00

## 2021-01-01 RX ADMIN — HEPARIN SODIUM 5000 UNITS: 5000 INJECTION INTRAVENOUS; SUBCUTANEOUS at 08:16

## 2021-01-01 RX ADMIN — EPINEPHRINE 0.5 MCG/KG/MIN: 1 INJECTION INTRAMUSCULAR; INTRAVENOUS; SUBCUTANEOUS at 03:19

## 2021-01-01 RX ADMIN — EPINEPHRINE 0.5 MCG/KG/MIN: 1 INJECTION INTRAMUSCULAR; INTRAVENOUS; SUBCUTANEOUS at 10:07

## 2021-01-01 RX ADMIN — ALBUMIN HUMAN 50 G: 0.25 SOLUTION INTRAVENOUS at 19:55

## 2021-01-01 RX ADMIN — ALBUMIN HUMAN 25 G: 0.25 SOLUTION INTRAVENOUS at 04:08

## 2021-01-01 RX ADMIN — DEXTROSE AND SODIUM CHLORIDE 150 ML/HR: 5; 900 INJECTION, SOLUTION INTRAVENOUS at 03:11

## 2021-01-01 RX ADMIN — FIDAXOMICIN 200 MG: 200 TABLET, FILM COATED ORAL at 09:07

## 2021-01-01 RX ADMIN — EPINEPHRINE 0.38 MCG/KG/MIN: 1 INJECTION INTRAMUSCULAR; INTRAVENOUS; SUBCUTANEOUS at 05:15

## 2021-01-01 RX ADMIN — ALBUMIN HUMAN 50 G: 0.25 SOLUTION INTRAVENOUS at 19:22

## 2021-01-01 RX ADMIN — Medication 250 MG: at 08:52

## 2021-01-01 RX ADMIN — METHYLPREDNISOLONE SODIUM SUCCINATE 40 MG: 40 INJECTION, POWDER, FOR SOLUTION INTRAMUSCULAR; INTRAVENOUS at 02:32

## 2021-01-01 RX ADMIN — PIPERACILLIN AND TAZOBACTAM 3.38 G: 3; .375 INJECTION, POWDER, LYOPHILIZED, FOR SOLUTION INTRAVENOUS at 05:40

## 2021-01-01 RX ADMIN — PANTOPRAZOLE SODIUM 80 MG: 40 INJECTION, POWDER, FOR SOLUTION INTRAVENOUS at 09:41

## 2021-01-01 RX ADMIN — NOREPINEPHRINE BITARTRATE 0.6 MCG/KG/MIN: 1 INJECTION, SOLUTION, CONCENTRATE INTRAVENOUS at 03:23

## 2021-01-01 RX ADMIN — PIPERACILLIN AND TAZOBACTAM 3.38 G: 3; .375 INJECTION, POWDER, FOR SOLUTION INTRAVENOUS at 04:44

## 2021-01-01 RX ADMIN — PIPERACILLIN AND TAZOBACTAM 3.38 G: 3; .375 INJECTION, POWDER, FOR SOLUTION INTRAVENOUS at 23:50

## 2021-01-01 RX ADMIN — SODIUM BICARBONATE 150 MEQ: 84 INJECTION, SOLUTION INTRAVENOUS at 04:18

## 2021-01-01 RX ADMIN — MESALAMINE 4.8 G: 1.2 TABLET, DELAYED RELEASE ORAL at 07:12

## 2021-01-01 RX ADMIN — CALCIUM CHLORIDE 0.25 G: 100 INJECTION INTRAVENOUS; INTRAVENTRICULAR at 05:46

## 2021-01-01 RX ADMIN — EPINEPHRINE 1 MG: 0.1 INJECTION INTRACARDIAC; INTRAVENOUS at 10:21

## 2021-01-01 RX ADMIN — EPINEPHRINE 0.2 MG: 0.1 INJECTION INTRACARDIAC; INTRAVENOUS at 06:59

## 2021-01-01 RX ADMIN — EPINEPHRINE 0.5 MG: 0.1 INJECTION INTRACARDIAC; INTRAVENOUS at 08:30

## 2021-01-01 RX ADMIN — PHENYLEPHRINE HYDROCHLORIDE 6 MCG/KG/MIN: 10 INJECTION INTRAVENOUS at 02:25

## 2021-01-01 RX ADMIN — EPINEPHRINE 0.5 MCG/KG/MIN: 1 INJECTION INTRAMUSCULAR; INTRAVENOUS; SUBCUTANEOUS at 16:22

## 2021-01-01 RX ADMIN — MORPHINE SULFATE 4 MG: 4 INJECTION, SOLUTION INTRAMUSCULAR; INTRAVENOUS at 23:50

## 2021-01-01 RX ADMIN — SODIUM CHLORIDE, PRESERVATIVE FREE 10 ML: 5 INJECTION INTRAVENOUS at 20:46

## 2021-01-01 RX ADMIN — SODIUM BICARBONATE 150 MEQ: 84 INJECTION, SOLUTION INTRAVENOUS at 08:07

## 2021-01-01 RX ADMIN — SODIUM CHLORIDE 999 ML/HR: 0.9 INJECTION, SOLUTION INTRAVENOUS at 12:00

## 2021-01-01 RX ADMIN — VANCOMYCIN HYDROCHLORIDE 500 MG: 250 CAPSULE ORAL at 11:08

## 2021-01-01 RX ADMIN — VANCOMYCIN HYDROCHLORIDE 500 MG: 250 CAPSULE ORAL at 13:09

## 2021-01-01 RX ADMIN — VASOPRESSIN 0.05 UNITS/MIN: 20 INJECTION INTRAVENOUS at 07:00

## 2021-01-01 RX ADMIN — VANCOMYCIN HYDROCHLORIDE 500 MG: 250 CAPSULE ORAL at 11:05

## 2021-01-01 RX ADMIN — IBUPROFEN 200 MG: 400 TABLET, FILM COATED ORAL at 19:35

## 2021-01-01 RX ADMIN — FIDAXOMICIN 200 MG: 200 TABLET, FILM COATED ORAL at 21:34

## 2021-01-01 RX ADMIN — FOLIC ACID TAB 400 MCG 800 MCG: 400 TAB at 07:13

## 2021-01-01 RX ADMIN — SODIUM CHLORIDE 100 ML/HR: 9 INJECTION, SOLUTION INTRAVENOUS at 23:31

## 2021-01-01 RX ADMIN — VASOPRESSIN 0.05 UNITS/MIN: 20 INJECTION INTRAVENOUS at 12:12

## 2021-01-01 RX ADMIN — HEPARIN SODIUM 5000 UNITS: 5000 INJECTION INTRAVENOUS; SUBCUTANEOUS at 21:20

## 2021-01-01 RX ADMIN — PIPERACILLIN AND TAZOBACTAM 4.5 G: 4; .5 INJECTION, POWDER, LYOPHILIZED, FOR SOLUTION INTRAVENOUS; PARENTERAL at 03:27

## 2021-01-01 RX ADMIN — SODIUM CHLORIDE, POTASSIUM CHLORIDE, SODIUM LACTATE AND CALCIUM CHLORIDE 1000 ML: 600; 310; 30; 20 INJECTION, SOLUTION INTRAVENOUS at 03:22

## 2021-01-01 RX ADMIN — PHENYLEPHRINE HYDROCHLORIDE 6 MCG/KG/MIN: 10 INJECTION INTRAVENOUS at 07:26

## 2021-01-01 RX ADMIN — SODIUM BICARBONATE 50 MEQ: 84 INJECTION INTRAVENOUS at 06:33

## 2021-01-01 RX ADMIN — FIDAXOMICIN 200 MG: 200 TABLET, FILM COATED ORAL at 20:33

## 2021-01-01 RX ADMIN — MIDAZOLAM 2 MG: 1 INJECTION INTRAMUSCULAR; INTRAVENOUS at 12:25

## 2021-01-01 RX ADMIN — EPINEPHRINE 1 MG: 0.1 INJECTION INTRACARDIAC; INTRAVENOUS at 10:56

## 2021-01-01 RX ADMIN — PIPERACILLIN AND TAZOBACTAM 4.5 G: 4; .5 INJECTION, POWDER, LYOPHILIZED, FOR SOLUTION INTRAVENOUS; PARENTERAL at 20:26

## 2021-01-01 RX ADMIN — HYDROCORTISONE SODIUM SUCCINATE 50 MG: 100 INJECTION, POWDER, FOR SOLUTION INTRAMUSCULAR; INTRAVENOUS at 04:19

## 2021-01-01 RX ADMIN — NOREPINEPHRINE BITARTRATE 0.55 MCG/KG/MIN: 1 INJECTION, SOLUTION, CONCENTRATE INTRAVENOUS at 06:50

## 2021-01-01 RX ADMIN — Medication 250 MG: at 20:33

## 2021-01-01 RX ADMIN — PHENYLEPHRINE HYDROCHLORIDE 6 MCG/KG/MIN: 10 INJECTION INTRAVENOUS at 21:32

## 2021-01-01 RX ADMIN — EPINEPHRINE 1 MG: 0.1 INJECTION INTRACARDIAC; INTRAVENOUS at 10:45

## 2021-01-01 RX ADMIN — DEXTROSE AND SODIUM CHLORIDE 150 ML/HR: 5; 900 INJECTION, SOLUTION INTRAVENOUS at 19:52

## 2021-01-01 RX ADMIN — SODIUM BICARBONATE: 84 INJECTION, SOLUTION INTRAVENOUS at 21:42

## 2021-01-01 RX ADMIN — METHYLPREDNISOLONE SODIUM SUCCINATE 40 MG: 40 INJECTION, POWDER, FOR SOLUTION INTRAMUSCULAR; INTRAVENOUS at 17:10

## 2021-01-01 RX ADMIN — MESALAMINE 4.8 G: 1.2 TABLET, DELAYED RELEASE ORAL at 13:22

## 2021-01-01 RX ADMIN — SODIUM BICARBONATE: 84 INJECTION, SOLUTION INTRAVENOUS at 02:19

## 2021-01-01 RX ADMIN — MICAFUNGIN SODIUM 150 MG: 100 INJECTION, POWDER, LYOPHILIZED, FOR SOLUTION INTRAVENOUS at 09:21

## 2021-01-01 RX ADMIN — PHENYLEPHRINE HYDROCHLORIDE 3 MCG/KG/MIN: 10 INJECTION INTRAVENOUS at 09:30

## 2021-01-01 RX ADMIN — HEPARIN SODIUM 5000 UNITS: 5000 INJECTION INTRAVENOUS; SUBCUTANEOUS at 09:06

## 2021-01-01 RX ADMIN — EPINEPHRINE 0.5 MCG/KG/MIN: 1 INJECTION INTRAMUSCULAR; INTRAVENOUS; SUBCUTANEOUS at 19:17

## 2021-01-01 RX ADMIN — PHENYLEPHRINE HYDROCHLORIDE 1.7 MCG/KG/MIN: 10 INJECTION INTRAVENOUS at 07:00

## 2021-01-01 RX ADMIN — SODIUM BICARBONATE: 84 INJECTION, SOLUTION INTRAVENOUS at 16:17

## 2021-01-01 RX ADMIN — SODIUM CHLORIDE 150 ML/HR: 9 INJECTION, SOLUTION INTRAVENOUS at 11:18

## 2021-01-01 RX ADMIN — SODIUM CHLORIDE, PRESERVATIVE FREE 10 ML: 5 INJECTION INTRAVENOUS at 09:07

## 2021-01-01 RX ADMIN — METRONIDAZOLE 500 MG: 500 TABLET ORAL at 06:06

## 2021-01-01 RX ADMIN — HYDROMORPHONE HYDROCHLORIDE 0.5 MG: 2 INJECTION, SOLUTION INTRAMUSCULAR; INTRAVENOUS; SUBCUTANEOUS at 10:38

## 2021-01-01 RX ADMIN — SODIUM BICARBONATE 50 MEQ: 84 INJECTION INTRAVENOUS at 06:29

## 2021-01-01 RX ADMIN — EPINEPHRINE 0.35 MCG/KG/MIN: 1 INJECTION INTRAMUSCULAR; INTRAVENOUS; SUBCUTANEOUS at 11:49

## 2021-01-01 RX ADMIN — DEXTROSE AND SODIUM CHLORIDE 150 ML/HR: 5; 900 INJECTION, SOLUTION INTRAVENOUS at 10:33

## 2021-01-01 RX ADMIN — Medication 0.2 MCG/KG/MIN: at 05:46

## 2021-01-01 RX ADMIN — SODIUM CHLORIDE 100 ML/HR: 9 INJECTION, SOLUTION INTRAVENOUS at 12:40

## 2021-01-01 RX ADMIN — VANCOMYCIN HYDROCHLORIDE 500 MG: 250 CAPSULE ORAL at 23:57

## 2021-01-01 RX ADMIN — SODIUM CHLORIDE, PRESERVATIVE FREE 10 ML: 5 INJECTION INTRAVENOUS at 08:20

## 2021-01-01 RX ADMIN — PHENYLEPHRINE HYDROCHLORIDE 6 MCG/KG/MIN: 10 INJECTION INTRAVENOUS at 15:24

## 2021-01-01 RX ADMIN — METHYLPREDNISOLONE SODIUM SUCCINATE 40 MG: 40 INJECTION, POWDER, FOR SOLUTION INTRAMUSCULAR; INTRAVENOUS at 01:41

## 2021-01-01 RX ADMIN — NOREPINEPHRINE BITARTRATE 0.7 MCG/KG/MIN: 1 INJECTION, SOLUTION, CONCENTRATE INTRAVENOUS at 08:49

## 2021-01-01 RX ADMIN — METRONIDAZOLE 500 MG: 500 INJECTION, SOLUTION INTRAVENOUS at 04:46

## 2021-01-01 RX ADMIN — SODIUM BICARBONATE: 84 INJECTION, SOLUTION INTRAVENOUS at 08:36

## 2021-01-01 RX ADMIN — HYDROCORTISONE SODIUM SUCCINATE 100 MG: 100 INJECTION, POWDER, FOR SOLUTION INTRAMUSCULAR; INTRAVENOUS at 07:42

## 2021-01-01 RX ADMIN — SODIUM BICARBONATE 50 MEQ: 84 INJECTION INTRAVENOUS at 07:04

## 2021-01-01 RX ADMIN — MESALAMINE 4.8 G: 1.2 TABLET, DELAYED RELEASE ORAL at 08:52

## 2021-01-01 RX ADMIN — CALCIUM GLUCONATE 3 G: 98 INJECTION, SOLUTION INTRAVENOUS at 09:01

## 2021-01-01 RX ADMIN — SODIUM BICARBONATE: 84 INJECTION, SOLUTION INTRAVENOUS at 19:52

## 2021-01-01 RX ADMIN — SODIUM BICARBONATE 50 MEQ: 84 INJECTION INTRAVENOUS at 05:55

## 2021-01-01 RX ADMIN — Medication 250 MG: at 21:34

## 2021-01-01 RX ADMIN — MICAFUNGIN SODIUM 150 MG: 100 INJECTION, POWDER, LYOPHILIZED, FOR SOLUTION INTRAVENOUS at 10:40

## 2021-01-01 RX ADMIN — METHYLPREDNISOLONE SODIUM SUCCINATE 40 MG: 40 INJECTION, POWDER, FOR SOLUTION INTRAMUSCULAR; INTRAVENOUS at 04:04

## 2021-01-01 RX ADMIN — SODIUM CHLORIDE 150 ML/HR: 9 INJECTION, SOLUTION INTRAVENOUS at 21:41

## 2021-01-01 RX ADMIN — PANTOPRAZOLE SODIUM 40 MG: 40 INJECTION, POWDER, FOR SOLUTION INTRAVENOUS at 03:23

## 2021-01-01 RX ADMIN — DOPAMINE HYDROCHLORIDE IN DEXTROSE 20 MCG/KG/MIN: 1.6 INJECTION, SOLUTION INTRAVENOUS at 08:03

## 2021-01-01 RX ADMIN — CALCIUM GLUCONATE 1 G: 20 INJECTION, SOLUTION INTRAVENOUS at 08:06

## 2021-01-01 RX ADMIN — Medication 250 MG: at 09:06

## 2021-01-01 RX ADMIN — METHYLPREDNISOLONE SODIUM SUCCINATE 40 MG: 40 INJECTION, POWDER, FOR SOLUTION INTRAMUSCULAR; INTRAVENOUS at 09:06

## 2021-01-01 RX ADMIN — CHOLESTYRAMINE 4 G: 4 POWDER, FOR SUSPENSION ORAL at 05:40

## 2021-01-01 RX ADMIN — Medication 250 MG: at 21:20

## 2021-01-01 RX ADMIN — FOLIC ACID TAB 400 MCG 800 MCG: 400 TAB at 08:52

## 2021-01-01 RX ADMIN — MESALAMINE 4.8 G: 1.2 TABLET, DELAYED RELEASE ORAL at 08:16

## 2021-01-01 RX ADMIN — VANCOMYCIN HYDROCHLORIDE 500 MG: 250 CAPSULE ORAL at 17:01

## 2021-01-01 RX ADMIN — PHENYLEPHRINE HYDROCHLORIDE 6 MCG/KG/MIN: 10 INJECTION INTRAVENOUS at 10:38

## 2021-01-01 RX ADMIN — ONDANSETRON 4 MG: 2 INJECTION INTRAMUSCULAR; INTRAVENOUS at 09:41

## 2021-01-01 RX ADMIN — CALCIUM GLUCONATE 3 G: 98 INJECTION, SOLUTION INTRAVENOUS at 07:26

## 2021-01-01 RX ADMIN — FOLIC ACID TAB 400 MCG 800 MCG: 400 TAB at 09:06

## 2021-01-01 RX ADMIN — SODIUM CHLORIDE 100 ML/HR: 9 INJECTION, SOLUTION INTRAVENOUS at 02:32

## 2021-01-01 RX ADMIN — HYDROMORPHONE HYDROCHLORIDE 2 MG: 2 INJECTION, SOLUTION INTRAMUSCULAR; INTRAVENOUS; SUBCUTANEOUS at 12:07

## 2021-01-01 RX ADMIN — MESALAMINE 4.8 G: 1.2 TABLET, DELAYED RELEASE ORAL at 09:07

## 2021-01-01 RX ADMIN — SODIUM BICARBONATE 50 MEQ: 84 INJECTION INTRAVENOUS at 06:58

## 2021-01-01 RX ADMIN — EPINEPHRINE 0.5 MG: 0.1 INJECTION INTRACARDIAC; INTRAVENOUS at 07:45

## 2021-01-01 RX ADMIN — VASOPRESSIN 0.03 UNITS/MIN: 20 INJECTION INTRAVENOUS at 08:30

## 2021-01-01 RX ADMIN — Medication 1 MG/HR: at 14:02

## 2021-01-01 RX ADMIN — FOLIC ACID TAB 400 MCG 800 MCG: 400 TAB at 08:16

## 2021-01-01 RX ADMIN — CALCIUM CHLORIDE 0.25 G: 100 INJECTION INTRAVENOUS; INTRAVENTRICULAR at 05:45

## 2021-01-01 RX ADMIN — SODIUM CHLORIDE 150 ML/HR: 9 INJECTION, SOLUTION INTRAVENOUS at 05:15

## 2021-01-01 RX ADMIN — HYDROMORPHONE HYDROCHLORIDE 1 MG: 2 INJECTION, SOLUTION INTRAMUSCULAR; INTRAVENOUS; SUBCUTANEOUS at 09:41

## 2021-01-01 RX ADMIN — EPINEPHRINE 0.1 MG: 0.1 INJECTION INTRACARDIAC; INTRAVENOUS at 06:53

## 2021-01-01 RX ADMIN — ROCURONIUM BROMIDE 50 MG: 10 INJECTION INTRAVENOUS at 05:46

## 2021-01-01 RX ADMIN — NOREPINEPHRINE BITARTRATE 0.58 MCG/KG/MIN: 1 INJECTION, SOLUTION, CONCENTRATE INTRAVENOUS at 15:08

## 2021-01-01 RX ADMIN — CHOLESTYRAMINE 4 G: 4 POWDER, FOR SUSPENSION ORAL at 13:09

## 2021-01-01 RX ADMIN — SODIUM BICARBONATE: 84 INJECTION, SOLUTION INTRAVENOUS at 15:08

## 2021-01-01 RX ADMIN — FIDAXOMICIN 200 MG: 200 TABLET, FILM COATED ORAL at 08:16

## 2021-01-01 RX ADMIN — Medication 250 MG: at 08:16

## 2021-01-01 RX ADMIN — SODIUM CHLORIDE 50 ML/HR: 9 INJECTION, SOLUTION INTRAVENOUS at 08:18

## 2021-01-01 RX ADMIN — METRONIDAZOLE 500 MG: 500 TABLET ORAL at 20:09

## 2021-01-01 RX ADMIN — MORPHINE SULFATE 4 MG: 4 INJECTION, SOLUTION INTRAMUSCULAR; INTRAVENOUS at 03:24

## 2021-01-01 RX ADMIN — CALCIUM CHLORIDE 0.25 G: 100 INJECTION INTRAVENOUS; INTRAVENTRICULAR at 05:44

## 2021-01-01 RX ADMIN — SODIUM CHLORIDE: 0.9 INJECTION, SOLUTION INTRAVENOUS at 05:35

## 2021-01-01 RX ADMIN — SODIUM CHLORIDE 150 ML/HR: 9 INJECTION, SOLUTION INTRAVENOUS at 14:51

## 2021-01-01 RX ADMIN — AZATHIOPRINE 75 MG: 50 TABLET ORAL at 08:50

## 2021-01-01 RX ADMIN — SODIUM CHLORIDE 100 ML/HR: 9 INJECTION, SOLUTION INTRAVENOUS at 07:13

## 2021-01-01 RX ADMIN — MINERAL OIL AND WHITE PETROLATUM: 150; 830 OINTMENT OPHTHALMIC at 03:24

## 2021-01-01 RX ADMIN — ALBUMIN HUMAN 50 G: 0.25 SOLUTION INTRAVENOUS at 06:37

## 2021-01-01 RX ADMIN — EPINEPHRINE 1 MG: 0.1 INJECTION INTRACARDIAC; INTRAVENOUS at 11:49

## 2021-01-01 RX ADMIN — SODIUM BICARBONATE: 84 INJECTION, SOLUTION INTRAVENOUS at 04:19

## 2021-01-01 RX ADMIN — METRONIDAZOLE 500 MG: 500 TABLET ORAL at 13:23

## 2021-01-01 RX ADMIN — SODIUM CHLORIDE 50 ML/HR: 9 INJECTION, SOLUTION INTRAVENOUS at 03:49

## 2021-01-01 RX ADMIN — MULTIPLE VITAMINS W/ MINERALS TAB 1 TABLET: TAB at 07:12

## 2021-01-01 RX ADMIN — VASOPRESSIN 0.03 UNITS/MIN: 20 INJECTION INTRAVENOUS at 21:37

## 2021-01-01 RX ADMIN — PHENYLEPHRINE HYDROCHLORIDE 3 MCG/KG/MIN: 10 INJECTION INTRAVENOUS at 09:19

## 2021-01-01 RX ADMIN — SODIUM CHLORIDE, PRESERVATIVE FREE 10 ML: 5 INJECTION INTRAVENOUS at 12:41

## 2021-01-01 RX ADMIN — ROCURONIUM BROMIDE 50 MG: 10 INJECTION INTRAVENOUS at 07:08

## 2021-01-01 RX ADMIN — SODIUM BICARBONATE: 84 INJECTION, SOLUTION INTRAVENOUS at 10:37

## 2021-01-01 RX ADMIN — SODIUM BICARBONATE: 84 INJECTION, SOLUTION INTRAVENOUS at 08:33

## 2021-01-01 RX ADMIN — PHENYLEPHRINE HYDROCHLORIDE 6 MCG/KG/MIN: 10 INJECTION INTRAVENOUS at 07:11

## 2021-01-01 RX ADMIN — METHYLPREDNISOLONE SODIUM SUCCINATE 40 MG: 40 INJECTION, POWDER, FOR SOLUTION INTRAMUSCULAR; INTRAVENOUS at 17:51

## 2021-01-01 RX ADMIN — NOREPINEPHRINE BITARTRATE 0.7 MCG/KG/MIN: 1 INJECTION, SOLUTION, CONCENTRATE INTRAVENOUS at 10:33

## 2021-01-01 RX ADMIN — DOPAMINE HYDROCHLORIDE IN DEXTROSE 20 MCG/KG/MIN: 1.6 INJECTION, SOLUTION INTRAVENOUS at 09:30

## 2021-01-01 RX ADMIN — SODIUM CHLORIDE 999 ML/HR: 0.9 INJECTION, SOLUTION INTRAVENOUS at 10:00

## 2021-01-01 RX ADMIN — ALBUMIN (HUMAN) 500 ML: 12.5 INJECTION, SOLUTION INTRAVENOUS at 14:25

## 2021-01-01 RX ADMIN — SODIUM CHLORIDE, PRESERVATIVE FREE 10 ML: 5 INJECTION INTRAVENOUS at 07:13

## 2021-01-01 RX ADMIN — NOREPINEPHRINE BITARTRATE 0.7 MCG/KG/MIN: 1 INJECTION, SOLUTION, CONCENTRATE INTRAVENOUS at 03:18

## 2021-01-01 RX ADMIN — DOPAMINE HYDROCHLORIDE IN DEXTROSE 20 MCG/KG/MIN: 1.6 INJECTION, SOLUTION INTRAVENOUS at 03:27

## 2021-01-01 RX ADMIN — PIPERACILLIN AND TAZOBACTAM 4.5 G: 4; .5 INJECTION, POWDER, LYOPHILIZED, FOR SOLUTION INTRAVENOUS; PARENTERAL at 11:19

## 2021-01-01 RX ADMIN — EPINEPHRINE 0.5 MG: 0.1 INJECTION INTRACARDIAC; INTRAVENOUS at 09:40

## 2021-01-01 RX ADMIN — METRONIDAZOLE 500 MG: 500 INJECTION, SOLUTION INTRAVENOUS at 20:45

## 2021-01-01 RX ADMIN — DOPAMINE HYDROCHLORIDE IN DEXTROSE 20 MCG/KG/MIN: 1.6 INJECTION, SOLUTION INTRAVENOUS at 16:17

## 2021-01-01 RX ADMIN — EPINEPHRINE 0.05 MG: 0.1 INJECTION INTRACARDIAC; INTRAVENOUS at 06:44

## 2021-01-01 RX ADMIN — METHYLPREDNISOLONE SODIUM SUCCINATE 40 MG: 40 INJECTION, POWDER, FOR SOLUTION INTRAMUSCULAR; INTRAVENOUS at 17:13

## 2021-01-01 RX ADMIN — MORPHINE SULFATE 4 MG: 4 INJECTION, SOLUTION INTRAMUSCULAR; INTRAVENOUS at 05:02

## 2021-01-01 RX ADMIN — Medication 0.3 MCG/KG/MIN: at 07:00

## 2021-01-01 RX ADMIN — EPINEPHRINE 1 MG: 0.1 INJECTION INTRACARDIAC; INTRAVENOUS at 10:29

## 2021-01-01 RX ADMIN — ETOMIDATE 10 MG: 40 INJECTION, SOLUTION INTRAVENOUS at 05:46

## 2021-01-01 RX ADMIN — SODIUM BICARBONATE: 84 INJECTION, SOLUTION INTRAVENOUS at 00:02

## 2021-01-01 RX ADMIN — EPINEPHRINE 0.2 MG: 0.1 INJECTION INTRACARDIAC; INTRAVENOUS at 07:04

## 2021-01-01 RX ADMIN — MORPHINE SULFATE 4 MG: 4 INJECTION, SOLUTION INTRAMUSCULAR; INTRAVENOUS at 21:48

## 2021-01-01 RX ADMIN — VANCOMYCIN HYDROCHLORIDE 500 MG: 250 CAPSULE ORAL at 17:03

## 2021-01-01 RX ADMIN — DEXTROSE AND SODIUM CHLORIDE 150 ML/HR: 5; 900 INJECTION, SOLUTION INTRAVENOUS at 14:00

## 2021-01-01 RX ADMIN — CHOLESTYRAMINE 4 G: 4 POWDER, FOR SUSPENSION ORAL at 20:45

## 2021-01-01 RX ADMIN — IOPAMIDOL 100 ML: 755 INJECTION, SOLUTION INTRAVENOUS at 10:18

## 2021-01-01 RX ADMIN — DOPAMINE HYDROCHLORIDE IN DEXTROSE 20 MCG/KG/MIN: 1.6 INJECTION, SOLUTION INTRAVENOUS at 03:30

## 2021-01-01 RX ADMIN — EPINEPHRINE 1 MG: 0.1 INJECTION INTRACARDIAC; INTRAVENOUS at 10:00

## 2021-01-01 RX ADMIN — NOREPINEPHRINE BITARTRATE 0.7 MCG/KG/MIN: 1 INJECTION, SOLUTION, CONCENTRATE INTRAVENOUS at 19:17

## 2021-01-01 RX ADMIN — SODIUM CHLORIDE, PRESERVATIVE FREE 10 ML: 5 INJECTION INTRAVENOUS at 21:20

## 2021-01-01 RX ADMIN — DOPAMINE HYDROCHLORIDE IN DEXTROSE 20 MCG/KG/MIN: 1.6 INJECTION, SOLUTION INTRAVENOUS at 21:30

## 2021-01-01 RX ADMIN — NOREPINEPHRINE BITARTRATE 0.5 MCG/KG/MIN: 1 INJECTION, SOLUTION, CONCENTRATE INTRAVENOUS at 10:08

## 2021-01-01 RX ADMIN — VANCOMYCIN HYDROCHLORIDE 500 MG: 250 CAPSULE ORAL at 20:09

## 2021-01-01 RX ADMIN — EPINEPHRINE 0.05 MG: 0.1 INJECTION INTRACARDIAC; INTRAVENOUS at 06:41

## 2021-01-01 RX ADMIN — MORPHINE SULFATE 4 MG: 4 INJECTION, SOLUTION INTRAMUSCULAR; INTRAVENOUS at 12:53

## 2021-01-01 RX ADMIN — SODIUM CHLORIDE, PRESERVATIVE FREE 10 ML: 5 INJECTION INTRAVENOUS at 08:17

## 2021-01-01 RX ADMIN — MINERAL OIL AND WHITE PETROLATUM: 150; 830 OINTMENT OPHTHALMIC at 23:50

## 2021-01-01 RX ADMIN — HEPARIN SODIUM 5000 UNITS: 5000 INJECTION INTRAVENOUS; SUBCUTANEOUS at 20:33

## 2021-01-01 RX ADMIN — HEPARIN SODIUM 5000 UNITS: 5000 INJECTION INTRAVENOUS; SUBCUTANEOUS at 21:34

## 2021-01-01 RX ADMIN — CALCIUM CHLORIDE 2 G: 100 INJECTION INTRAVENOUS; INTRAVENTRICULAR at 09:42

## 2021-01-01 RX ADMIN — SODIUM CHLORIDE, POTASSIUM CHLORIDE, SODIUM LACTATE AND CALCIUM CHLORIDE 1000 ML: 600; 310; 30; 20 INJECTION, SOLUTION INTRAVENOUS at 09:42

## 2021-01-01 RX ADMIN — VANCOMYCIN HYDROCHLORIDE 500 MG: 250 CAPSULE ORAL at 06:06

## 2021-01-01 RX ADMIN — EPINEPHRINE 0.2 MCG/KG/MIN: 1 INJECTION INTRAMUSCULAR; INTRAVENOUS; SUBCUTANEOUS at 06:36

## 2021-01-01 RX ADMIN — SODIUM CHLORIDE, PRESERVATIVE FREE 10 ML: 5 INJECTION INTRAVENOUS at 08:53

## 2021-01-01 RX ADMIN — FENTANYL CITRATE 50 MCG: 50 INJECTION INTRAMUSCULAR; INTRAVENOUS at 12:25

## 2021-01-01 RX ADMIN — PIPERACILLIN AND TAZOBACTAM 3.38 G: 3; .375 INJECTION, POWDER, FOR SOLUTION INTRAVENOUS at 14:27

## 2021-01-01 RX ADMIN — SODIUM CHLORIDE 150 ML/HR: 9 INJECTION, SOLUTION INTRAVENOUS at 13:15

## 2021-01-01 RX ADMIN — EPINEPHRINE 0.1 MG: 0.1 INJECTION INTRACARDIAC; INTRAVENOUS at 06:48

## 2021-01-01 RX ADMIN — FENTANYL CITRATE 25 MCG: 50 INJECTION INTRAMUSCULAR; INTRAVENOUS at 19:54

## 2021-01-01 RX ADMIN — FIDAXOMICIN 200 MG: 200 TABLET, FILM COATED ORAL at 21:20

## 2021-01-01 RX ADMIN — PHENYLEPHRINE HYDROCHLORIDE 6 MCG/KG/MIN: 10 INJECTION INTRAVENOUS at 19:17

## 2021-01-01 RX ADMIN — PHENYLEPHRINE HYDROCHLORIDE 6 MCG/KG/MIN: 10 INJECTION INTRAVENOUS at 15:10

## 2021-01-01 RX ADMIN — SODIUM BICARBONATE: 84 INJECTION, SOLUTION INTRAVENOUS at 07:04

## 2021-01-11 NOTE — TELEPHONE ENCOUNTER
----- Message from Jose D Antunez MD sent at 1/11/2021 10:40 AM EST -----  Your ultrasound looks good, you do not have an aortic aneurysm, thanks

## 2021-04-20 NOTE — PROGRESS NOTES
Subjective   Gabriel Sandhu is a 65 y.o. male.     Chief Complaint   Patient presents with   • Difficulty Urinating       Difficulty Urinating  This is a new problem. The current episode started in the past 7 days. The problem occurs constantly. The problem has been gradually worsening. Pertinent negatives include no abdominal pain, chest pain, chills, diaphoresis, fatigue, headaches, nausea or urinary symptoms. Nothing aggravates the symptoms. He has tried nothing for the symptoms. The treatment provided mild relief.            I personally reviewed and updated the patient's allergies, medications, problem list, and past medical, surgical, social, and family history. I have reviewed and confirmed the accuracy of the History of Present Illness and Review of Symptoms as documented by the MA/LPN/RN. Jose D Antunez MD    Family History   Problem Relation Age of Onset   • Alzheimer's disease Father    • Dementia Father    • No Known Problems Mother    • No Known Problems Sister    • No Known Problems Brother    • No Known Problems Daughter    • No Known Problems Sister    • No Known Problems Brother    • No Known Problems Brother        Social History     Tobacco Use   • Smoking status: Former Smoker     Packs/day: 2.50     Years: 21.00     Pack years: 52.50     Types: Cigarettes     Start date:      Quit date:      Years since quittin.3   • Smokeless tobacco: Never Used   Vaping Use   • Vaping Use: Never used   Substance Use Topics   • Alcohol use: Yes     Comment: Drinks wine, Occasional alcohol use.   • Drug use: No       History reviewed. No pertinent surgical history.    Patient Active Problem List   Diagnosis   • Annual visit for general adult medical examination with abnormal findings   • Colon cancer screening   • Screening PSA (prostate specific antigen)   • C. difficile diarrhea   • Chronic ulcerative proctitis without complications (CMS/HCC)   • ED (erectile dysfunction)   • Generalized  "osteoarthritis   • History of tobacco use   • Hyperlipidemia   • Onychomycosis   • Overweight (BMI 25.0-29.9)   • Prostatitis, acute   • Welcome to Medicare preventive visit         Current Outpatient Medications:   •  azaTHIOprine (IMURAN) 50 MG tablet, , Disp: , Rfl:   •  Calcium Carb-Cholecalciferol (CALCIUM 600-D PO), Take 1 capsule by mouth Daily., Disp: , Rfl:   •  folic acid (FOLVITE) 800 MCG tablet, Take  by mouth Daily., Disp: , Rfl:   •  Glucosamine HCl (GLUCOSAMINE PO), Take  by mouth., Disp: , Rfl:   •  mesalamine (LIALDA) 1.2 g EC tablet, Take  by mouth Daily., Disp: , Rfl:   •  Multiple Vitamins-Minerals (CENTRUM ADULTS) tablet, Take  by mouth Daily., Disp: , Rfl:   •  Red Yeast Rice Extract 600 MG capsule, Take 2 capsules by mouth Daily., Disp: , Rfl:   •  vardenafil (Levitra) 20 MG tablet, Take 1 tablet by mouth Daily As Needed for Erectile Dysfunction. Take one hour prior to sexual activity, Disp: 6 tablet, Rfl: 12  •  vardenafil (LEVITRA) 20 MG tablet, TAKE 1 TABLET BY MOUTH ONCE DAILY TAKE  1  HOUR  PRIOR  TO  SEXUAL  ACTIVITY  (NO  MORE  THAN  ONCE  A  DAY), Disp: 6 tablet, Rfl: 0         Review of Systems   Constitutional: Negative for chills, diaphoresis and fatigue.   Eyes: Negative for visual disturbance.   Respiratory: Negative for shortness of breath.    Cardiovascular: Negative for chest pain and palpitations.   Gastrointestinal: Negative for abdominal pain and nausea.   Endocrine: Negative for polydipsia and polyphagia.   Genitourinary: Positive for difficulty urinating.   Musculoskeletal: Negative for neck stiffness.   Skin: Negative for color change and pallor.   Neurological: Negative for seizures and syncope.   Hematological: Negative for adenopathy.       I have reviewed and confirmed the accuracy of the ROS as documented by the MA/LPN/RN Jose D Antunez MD      Objective   /80   Pulse 76   Temp 97.5 °F (36.4 °C)   Resp 18   Ht 172.7 cm (68\")   Wt 78.1 kg (172 lb 3.2 oz)   " SpO2 98%   BMI 26.18 kg/m²   BP Readings from Last 3 Encounters:   04/21/21 120/80   12/23/20 129/84   11/02/20 124/80     Wt Readings from Last 3 Encounters:   04/21/21 78.1 kg (172 lb 3.2 oz)   12/23/20 76.9 kg (169 lb 9.6 oz)   11/02/20 77.2 kg (170 lb 3.2 oz)     Physical Exam  Constitutional:       Appearance: Normal appearance. He is well-developed. He is not diaphoretic.   Cardiovascular:      Rate and Rhythm: Normal rate and regular rhythm.      Pulses: Normal pulses.      Heart sounds: Normal heart sounds, S1 normal and S2 normal. No murmur heard.   No friction rub. No gallop.    Pulmonary:      Effort: Pulmonary effort is normal. No accessory muscle usage.      Breath sounds: Normal breath sounds. No stridor. No decreased breath sounds, wheezing, rhonchi or rales.   Abdominal:      General: Bowel sounds are normal. There is no distension.      Palpations: Abdomen is soft. Abdomen is not rigid. There is no mass or pulsatile mass.      Tenderness: There is no abdominal tenderness. There is no guarding or rebound. Negative signs include Barrientos's sign.      Hernia: No hernia is present.   Skin:     General: Skin is warm and dry.      Coloration: Skin is not pale.   Neurological:      Mental Status: He is alert and oriented to person, place, and time.      Coordination: Coordination normal.      Gait: Gait normal.         Data / Lab Results:    No results found for: HGBA1C  Lab Results   Component Value Date    GLU 88 11/23/2020     Lab Results   Component Value Date     (H) 11/23/2020     (H) 12/18/2019     (H) 12/17/2018     Lab Results   Component Value Date    CHOL 229 (H) 12/17/2018    CHOL 247 (H) 12/15/2017    CHOL 203 (H) 11/26/2016     Lab Results   Component Value Date    TRIG 144 11/23/2020    TRIG 139 12/18/2019    TRIG 106 12/17/2018     Lab Results   Component Value Date    HDL 37 (L) 11/23/2020    HDL 39 (L) 12/18/2019    HDL 41 12/17/2018     Lab Results   Component Value  Date    PSA 2.6 12/23/2020    PSA 2.5 12/18/2019    PSA 2.6 12/17/2018     Lab Results   Component Value Date    WBC 4.4 11/23/2020    HGB 16.0 11/23/2020    HCT 47.9 11/23/2020     (H) 11/23/2020     11/23/2020     Lab Results   Component Value Date    TSH 2.610 11/23/2020      Lab Results   Component Value Date    GLUCOSE 116 (H) 11/11/2018    BUN 15 11/23/2020    CREATININE 0.98 11/23/2020    EGFRIFNONA 81 11/23/2020    EGFRIFAFRI 93 11/23/2020    BCR 15 11/23/2020    K 4.5 11/23/2020    CO2 26 11/23/2020    CALCIUM 9.2 11/23/2020    PROTENTOTREF 6.9 11/23/2020    ALBUMIN 4.3 11/23/2020    LABIL2 1.7 11/23/2020    AST 27 11/23/2020    ALT 18 11/23/2020     No results found for: GALE, RF, SEDRATE   No results found for: CRP   No results found for: IRON, TIBC, FERRITIN   No results found for: [id number]       Assessment/Plan      Medications        Problem List         LOS      Health maintenance..  Doing well, vaccines updated.  He does not tolerate baby aspirin.  Discussed health maintenance, screening test, and lifestyle modification.  EKG benign today, AAA screening ultrasound scheduled.  Hyperlipidemia.    Improved, mild elevation, overall improved on red yeast rice 2400 mg daily.  He is working hard on diet and exercise.  Recheck 1 year.  Colon cancer screening.  Colonoscopy current, followed by [facility].  Prostate cancer screening.    PSA benign 12/20.  Crohn's disease.  Overall stable on Rx per [facility].  Status post recent flare/C. difficile colitis.  Hematuria.  Recurrent, gross.  Status post Rx for prostatitis in November, no symptoms currently.  History of bladder diverticula per CT.  Culture sent, repeat CT scan, urology referral scheduled.  Call return if worsening symptoms.  Remote history of tobacco use.      Diagnoses and all orders for this visit:    1. Gross hematuria (Primary)  -     Urine Culture - Urine, Urine, Clean Catch  -     CT Abdomen Pelvis Stone Protocol  -     Ambulatory Referral  to Urology    2. Dysuria  -     POCT urinalysis dipstick, manual  -     Urine Culture - Urine, Urine, Clean Catch    3. History of tobacco use    4. Overweight (BMI 25.0-29.9)              Expected course, medications, and adverse effects discussed.  Call or return if worsening or persistent symptoms.  I wore protective equipment throughout this patient encounter including a mask, gloves, and eye protection.  Hand hygiene was performed before donning protective equipment and after removal when leaving the room. The complete contents of the Assessment and Plan and Data/Lab Results as documented above have been reviewed and addressed by myself with the patient today as part of an ongoing evaluation / treatment plan.  If some of the documentation has been copied from a previous note and is unchanged it indicates that this problem / plan has been assessed today but is stable from a previous visit and no changes have been recommended.

## 2021-05-10 NOTE — TELEPHONE ENCOUNTER
Called and left message. Told him to let me know if he hadnt heard from  office yet. I left him their number as well.

## 2021-05-10 NOTE — TELEPHONE ENCOUNTER
----- Message from Jose D Antunez MD sent at 5/8/2021  8:02 AM EDT -----  Let him know his CT scan does show that he has some diverticula in his bladder which looked larger than the last check, these are benign weak spots in the wall of the pouch out, he has mild enlargement of his prostate, no kidney stones, want him to keep his appointment with urology as planned, thanks

## 2021-11-08 PROBLEM — E66.3 OVERWEIGHT (BMI 25.0-29.9): Status: RESOLVED | Noted: 2019-12-18 | Resolved: 2021-01-01

## 2021-11-08 PROBLEM — A04.72 C. DIFFICILE COLITIS: Status: ACTIVE | Noted: 2021-01-01

## 2021-11-08 PROBLEM — N41.0 PROSTATITIS, ACUTE: Status: RESOLVED | Noted: 2019-12-18 | Resolved: 2021-01-01

## 2021-11-08 PROBLEM — R10.32 ACUTE ABDOMINAL PAIN IN LEFT LOWER QUADRANT: Status: ACTIVE | Noted: 2021-01-01

## 2021-11-08 PROBLEM — E87.1 HYPONATREMIA: Status: ACTIVE | Noted: 2021-01-01

## 2021-11-08 PROBLEM — K51.20 CHRONIC ULCERATIVE PROCTITIS WITHOUT COMPLICATIONS (HCC): Chronic | Status: ACTIVE | Noted: 2019-12-18

## 2021-11-08 PROBLEM — A04.72 C. DIFFICILE DIARRHEA: Status: RESOLVED | Noted: 2019-12-18 | Resolved: 2021-01-01

## 2021-11-08 PROBLEM — K51.90 ULCERATIVE COLITIS (HCC): Chronic | Status: ACTIVE | Noted: 2019-12-18

## 2021-11-08 PROBLEM — K92.1 GASTROINTESTINAL HEMORRHAGE WITH MELENA: Status: ACTIVE | Noted: 2021-01-01

## 2021-11-08 PROBLEM — K92.2 GASTROINTESTINAL HEMORRHAGE: Status: ACTIVE | Noted: 2021-01-01

## 2021-11-08 PROBLEM — E78.5 HYPERLIPIDEMIA: Chronic | Status: ACTIVE | Noted: 2019-12-18

## 2021-11-08 NOTE — H&P
[facility] Medicine Services      Patient Name: Gabriel Sandhu  : 1955  MRN: [id number]  Primary Care Physician:  Jose D Antunez MD  Date of admission: 2021      Subjective      Chief Complaint:  Left lower quadrant pain, diarrhea, rectal bleeding    History of Present Illness: Gabriel Sandhu is a 66 y.o. male with a history of ulcerative colitis who presented to [facility] ED on 2021 complaining of left lower quadrant pain, diarrhea, and rectal bleeding. The patient states his pain started approximately 1 month ago, but has gotten progressively worse over the past 10 days. He states the pain radiates into his middle lower abdomen. He reports associated diarrhea, melena, nausea, and poor appetite. He denies any fever or chills. He reports a history of C. diff a couple years ago and states it started the same way. He denies any other complaints. He denies any exacerbating or alleviating factors. He denies any recent antibiotic use. He reportedly had stool studies done last week at his gastroenterologist's office. Reportedly per [facility], the patient tested positive for C. diff on 21.     Workup in the ER revealed pancolitis and hyponatremia. The patient was given Dilaudid 1 mg IV, Zofran 4 mg IV, Protonix 80 mg IV, and 1 liter LR. GI was consulted and recommended starting the patient on IV Flagyl, PO vancomycin, and PO Welchol. The patient was admitted to the Hospitalist group for further treatment.         Review of Systems   Constitutional: Positive for decreased appetite. Negative for chills and fever.   Gastrointestinal: Positive for abdominal pain, diarrhea, melena and nausea. Negative for vomiting.   All other systems reviewed and are negative.       Personal History     Past Medical History:   Diagnosis Date   • Annual physical exam     Impression: doing well, vaccines currrent Age specific anticipatory guidance and warning signs discussed. Diet, exercise,  and lifestyle modification discussed. Safety, seatbelts, and routine screening examinations discussed. Discussed self-examinations.   • Annual visit for general adult medical examination with abnormal findings     Impression: doing well, vaccines current Age specific anticipatory guidance and warning signs discussed. Diet, exercise, and lifestyle modification discussed. Safety, seatbelts, and routine screening examinations discussed. Discussed self-examinations.   • C. 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 Consider imaging if persistent symptoms.   • History of chickenpox    • History of tobacco use    • Hyperlipidemia     Impression: Discussed diet, exercise, lifestyle modification. moderate elevation, he's working hard on diet, exercise, exercising daily improved on red yeast rice at 2400mg daily Follow up recheck ------------- Stable Compliant with meds Is getting adequate diet and exercise Goals developed at last visit were met Care management needs are self addressed.   • Onychomycosis     Impression: Topical care discussed. Call / return if persistent symptoms.   • Overweight (BMI 25.0-29.9)    • Prostatitis, acute    • Screening PSA (prostate specific antigen)     Impression: renuka with mild, symmetric enlargement psa normal   • UC (ulcerative colitis) (HCC)        History reviewed. No pertinent surgical history.    Family History: family history includes Alzheimer's disease in his father; Dementia in his father; No Known Problems in his brother, brother, brother, daughter, mother, sister, and sister. Otherwise pertinent FHx was reviewed and not pertinent to current issue.    Social History:  reports that he quit smoking about 31 years ago. His  smoking use included cigarettes. He started smoking about 52 years ago. He has a 52.50 pack-year smoking history. He has never used smokeless tobacco. He reports current alcohol use. He reports that he does not use drugs.    Home Medications:  Prior to Admission Medications     Prescriptions Last Dose Informant Patient Reported? Taking?    azaTHIOprine (IMURAN) 50 MG tablet   Yes No    Calcium Carb-Cholecalciferol (CALCIUM 600-D PO)   Yes No    Take 1 capsule by mouth Daily.    folic acid (FOLVITE) 800 MCG tablet   Yes No    Take  by mouth Daily.    Glucosamine HCl (GLUCOSAMINE PO)   Yes No    Take  by mouth.    mesalamine (LIALDA) 1.2 g EC tablet   Yes No    Take  by mouth Daily.    Multiple Vitamins-Minerals (CENTRUM ADULTS) tablet   Yes No    Take  by mouth Daily.    Red Yeast Rice Extract 600 MG capsule   Yes No    Take 2 capsules by mouth Daily.    vardenafil (LEVITRA) 20 MG tablet   No No    TAKE 1 TABLET BY MOUTH ONCE DAILY TAKE  1  HOUR  PRIOR  TO  SEXUAL  ACTIVITY  (NO  MORE  THAN  ONCE  A  DAY)    vardenafil (Levitra) 20 MG tablet   No No    Take 1 tablet by mouth Daily As Needed for Erectile Dysfunction. Take one hour prior to sexual activity            Allergies:  No Known Allergies    Objective      Vitals:   Temp:  [98.3 °F (36.8 °C)] 98.3 °F (36.8 °C)  Heart Rate:  [] 92  Resp:  [20] 20  BP: (101-118)/(66-76) 112/68    Physical Exam  Vitals reviewed.   HENT:      Head: Normocephalic and atraumatic.      Nose: Nose normal.      Mouth/Throat:      Mouth: Mucous membranes are moist.   Eyes:      Extraocular Movements: Extraocular movements intact.      Conjunctiva/sclera: Conjunctivae normal.      Pupils: Pupils are equal, round, and reactive to light.   Cardiovascular:      Rate and Rhythm: Normal rate and regular rhythm.      Pulses: Normal pulses.      Heart sounds: Normal heart sounds.   Pulmonary:      Effort: Pulmonary effort is normal.      Breath sounds: Normal breath sounds.   Abdominal:       General: Bowel sounds are normal. There is no distension.      Palpations: Abdomen is soft.      Tenderness: There is abdominal tenderness in the left lower quadrant.   Musculoskeletal:         General: Normal range of motion.      Cervical back: Normal range of motion.      Right lower leg: No edema.      Left lower leg: No edema.   Skin:     General: Skin is warm and dry.      Capillary Refill: Capillary refill takes less than 2 seconds.   Neurological:      General: No focal deficit present.      Mental Status: He is alert and oriented to person, place, and time.   Psychiatric:         Mood and Affect: Mood normal.         Behavior: Behavior normal.          Result Review    Result Review:  I have personally reviewed the results from the time of this admission to 11/8/2021 12:29 EST and agree with these findings:  [x]  Laboratory  [x]  Microbiology  [x]  Radiology  []  EKG/Telemetry   []  Cardiology/Vascular   []  Pathology  [x]  Old records  []  Other:    Most notable findings include:   WBC 13.9, Na 125, anion gap 17.0, lactate 0.9, ESR 56, CRP 20.78    Covid-19 negative    CT abd/pelvis:  1.Wall thickening and hyperemia of colon, suggesting pancolitis.  2.Sigmoid diverticulosis.  3.Several prominent urinary bladder diverticula.  4.Bilateral L5 pars defects, with grade 1 anterolisthesis of L5 on S1.    Blood cultures pending      Assessment/Plan        Active Hospital Problems:  Active Hospital Problems    Diagnosis    • **C. difficile colitis    • Gastrointestinal hemorrhage    • Acute abdominal pain in left lower quadrant    • Hyponatremia    • Ulcerative colitis (HCC)      Plan:     C. difficile colitis  History of Ulcerative colitis and C. diff  Gastrointestinal hemorrhage with rectal bleeding  Acute abdominal pain in left lower quadrant  -contact/spore isolation  -PO vancomycin q6h x 10 days  -IV Flagyl q8h  -Welchol q8h  -avoid PPIs  -GI consulted  -clear liquid diet  -PRN analgesia and  antiemetics  -monitor H&H, transfuse for hgb <7.0 or hemodynamic instability     Hyponatremia  -likely dehydration secondary to diarrhea  -Na 125 on admission  -IVF  -monitor BMP and I&Os        DVT prophylaxis:  Mechanical DVT prophylaxis orders are present.    CODE STATUS:    Code Status (Patient has no pulse and is not breathing): CPR (Attempt to Resuscitate)  Medical Interventions (Patient has pulse or is breathing): Full Support    Admission Status:  I believe this patient meets inpatient status.    I discussed the patient's findings and my recommendations with patient.    This patient has been examined wearing appropriate Personal Protective Equipment. 11/08/21      Signature: Electronically signed by KULDEEP Pittman, 11/08/21, 2:03 PM EST.      Attending attestation:    The patient is a 66-year-old gentleman with ulcerative colitis who developed diarrhea and bright red blood per rectum associated with left lower quadrant abdominal pain and has subsequently tested positive for C. difficile colitis.  He has already been started on oral vancomycin, IV Flagyl and WelChol.  He reports feeling somewhat better the evening after admission.  Physical exam: Vital signs and nurses notes reviewed.  Sclera anicteric, mucous membranes moist, awake and alert in no acute distress; nontoxic-appearing; skin warm and dry.  CT scan abdomen and pelvis reviewed and showed pancolitis.  Assessment and plan: Acute abdominal pain, diarrhea and acute lower GI bleed concerning for C. difficile colitis in this patient with ulcerative colitis  -Continue present management and per GI.  The patient was seen and examined and chart reviewed on 11/8/2021.  I agree with the assessment and plan of the APRN.  Juana Forrester MD  Electronically signed by Juana Forrester MD, 11/09/21, 7:47 AM EST.

## 2021-11-08 NOTE — ED PROVIDER NOTES
Subjective   66-year-old male with history of ulcerative colitis presents with a 1 month history of left lower quadrant pain that has increased in intensity in the last 11 days.  He reports anorexia for the last 5 days.  He reports a 2-week history of melena.  He denies taking blood thinners.  He reports has been taking prednisone for 1 week.  He does report some associated nausea but denies vomiting.  He reports that he has been seen by his gastroenterologist and has taken a stool sample there and is pending results.  He reports he had a colonoscopy in March and reports that his ulcerative colitis was in remission.  He denies history of colon cancer.  His gastroenterologist is Dr. Goodson.    1. Location: Left lower quadrant  2. Quality: Sharp  3. Severity: Moderate to severe  4. Worsening factors: Bowel movement, eating  5. Alleviating factors: Denies  6. Onset: 1 month, worse 11 days ago  7. Radiation: Diffuse  8. Frequency: Constant  9. Co-morbidities: Past Medical History:  No date: Annual physical exam      Comment:  Impression: doing well, vaccines currrent Age specific                anticipatory guidance and warning signs discussed. Diet,                exercise, and lifestyle modification discussed. Safety,                seatbelts, and routine screening examinations discussed.                Discussed self-examinations.  No date: Annual visit for general adult medical examination with   abnormal findings      Comment:  Impression: doing well, vaccines current Age specific                anticipatory guidance and warning signs discussed. Diet,                exercise, and lifestyle modification discussed. Safety,                seatbelts, and routine screening examinations discussed.                Discussed self-examinations.  No date: C. difficile diarrhea      Comment:  Impression: improved on vancomycin / conrinue add                probiotics gi Follow up upcoming Call / return if unable                 to keep fluids down, if recurrant symptoms.  No date: Chronic ulcerative proctitis without complications (CMS/[facility])      Comment:  Impression: doing well / followed by GI  No date: Colon cancer screening      Comment:  Impression: colonoscopy current followed by GI  No date: ED (erectile dysfunction)  No date: Generalized osteoarthritis      Comment:  Impression: ice 20 minutes bid nsaids Rehabilitation                exercises discussed. Consider imaging if persistent                symptoms.  No date: History of chickenpox  No date: History of tobacco use  No date: Hyperlipidemia      Comment:  Impression: Discussed diet, exercise, lifestyle                modification. moderate elevation, he's working hard on                diet, exercise, exercising daily improved on red yeast                rice at 2400mg daily Follow up recheck -------------                Stable Compliant with meds Is getting adequate diet and                exercise Goals developed at last visit were met Care                management needs are self addressed.  No date: Onychomycosis      Comment:  Impression: Topical care discussed. Call / return if                persistent symptoms.  No date: Overweight (BMI 25.0-29.9)  No date: Prostatitis, acute      Comment:  Impression: much improved / resolving crvirs Increase                fluid intake. nsaids Follow up recheck Call / return if                fever, worsening symptoms.  No date: Screening PSA (prostate specific antigen)      Comment:  Impression: renuka with mild, symmetric enlargement psa                normal  No date: UC (ulcerative colitis) (CMS/[facility])      Comment:  Impression: doing well on asachol followed by GI  10. Source: Patient          Review of Systems   Constitutional: Negative for appetite change, chills, diaphoresis and fever.   Respiratory: Negative for shortness of breath.    Cardiovascular: Negative for chest pain.   Gastrointestinal: Positive for abdominal pain,  blood in stool, diarrhea and nausea. Negative for constipation and vomiting.   Genitourinary: Negative for decreased urine volume, difficulty urinating, flank pain and hematuria.   Skin: Negative for color change, pallor and rash.   Neurological: Positive for dizziness.   Hematological: Negative for adenopathy.   All other systems reviewed and are negative.      Past Medical History:   Diagnosis Date   • Annual physical exam     Impression: doing well, vaccines currrent Age specific anticipatory guidance and warning signs discussed. Diet, exercise, and lifestyle modification discussed. Safety, seatbelts, and routine screening examinations discussed. Discussed self-examinations.   • Annual visit for general adult medical examination with abnormal findings     Impression: doing well, vaccines current Age specific anticipatory guidance and warning signs discussed. Diet, exercise, and lifestyle modification discussed. Safety, seatbelts, and routine screening examinations discussed. Discussed self-examinations.   • C. difficile diarrhea     Impression: improved on vancomycin / conrinue add probiotics gi Follow up upcoming Call / return if unable to keep fluids down, if recurrant symptoms.   • Chronic ulcerative proctitis without complications (CMS/HCC)     Impression: doing well / followed by GI   • Colon cancer screening     Impression: colonoscopy current followed by GI   • ED (erectile dysfunction)    • Generalized osteoarthritis     Impression: ice 20 minutes bid nsaids Rehabilitation exercises discussed. Consider imaging if persistent symptoms.   • History of chickenpox    • History of tobacco use    • Hyperlipidemia     Impression: Discussed diet, exercise, lifestyle modification. moderate elevation, he's working hard on diet, exercise, exercising daily improved on red yeast rice at 2400mg daily Follow up recheck ------------- Stable Compliant with meds Is getting adequate diet and exercise Goals developed at last  visit were met Care management needs are self addressed.   • Onychomycosis     Impression: Topical care discussed. Call / return if persistent symptoms.   • Overweight (BMI 25.0-29.9)    • Prostatitis, acute     Impression: much improved / resolving crvirs Increase fluid intake. nsaids Follow up recheck Call / return if fever, worsening symptoms.   • Screening PSA (prostate specific antigen)     Impression: renuka with mild, symmetric enlargement psa normal   • UC (ulcerative colitis) (CMS/[facility])     Impression: doing well on asachol followed by GI       No Known Allergies    No past surgical history on file.    Family History   Problem Relation Age of Onset   • Alzheimer's disease Father    • Dementia Father    • No Known Problems Mother    • No Known Problems Sister    • No Known Problems Brother    • No Known Problems Daughter    • No Known Problems Sister    • No Known Problems Brother    • No Known Problems Brother        Social History     Socioeconomic History   • Marital status:    Tobacco Use   • Smoking status: Former Smoker     Packs/day: 2.50     Years: 21.00     Pack years: 52.50     Types: Cigarettes     Start date:      Quit date:      Years since quittin.8   • Smokeless tobacco: Never Used   Vaping Use   • Vaping Use: Never used   Substance and Sexual Activity   • Alcohol use: Yes     Comment: Drinks wine, Occasional alcohol use.   • Drug use: No   • Sexual activity: Defer           Objective   Physical Exam  Vitals and nursing note reviewed.   Constitutional:       General: He is awake. He is not in acute distress.     Appearance: Normal appearance. He is well-developed and normal weight. He is not ill-appearing or toxic-appearing.   HENT:      Mouth/Throat:      Mouth: Mucous membranes are moist.   Eyes:      General: No scleral icterus.  Cardiovascular:      Rate and Rhythm: Regular rhythm. Tachycardia present.      Heart sounds: Normal heart sounds, S1 normal and S2 normal. Heart  sounds not distant. No murmur heard.  No friction rub. No gallop.    Pulmonary:      Effort: Pulmonary effort is normal.      Breath sounds: Normal breath sounds and air entry.   Abdominal:      General: Abdomen is flat. Bowel sounds are normal. There is no distension.      Palpations: Abdomen is soft. There is no mass.      Tenderness: There is abdominal tenderness in the left lower quadrant. There is guarding. There is no right CVA tenderness, left CVA tenderness or rebound.      Hernia: No hernia is present.       Skin:     General: Skin is warm and dry.      Capillary Refill: Capillary refill takes less than 2 seconds.      Coloration: Skin is not jaundiced or pale.      Findings: No rash.   Neurological:      Mental Status: He is alert and oriented to person, place, and time.   Psychiatric:         Mood and Affect: Mood normal.         Behavior: Behavior normal. Behavior is cooperative.         Thought Content: Thought content normal.         Judgment: Judgment normal.         Procedures           ED Course      CT Abdomen Pelvis With Contrast    Result Date: 11/8/2021  1.Wall thickening and hyperemia of colon, suggesting pancolitis. 2.Sigmoid diverticulosis. 3.Several prominent urinary bladder diverticula. 4.Bilateral L5 pars defects, with grade 1 anterolisthesis of L5 on S1.  Electronically Signed By-German Chin MD On:11/8/2021 10:39 AM This report was finalized on [id number] by  German Chin MD.    Medications   sodium chloride 0.9 % flush 10 mL (has no administration in time range)   metroNIDAZOLE (FLAGYL) 500 mg/100mL IVPB (has no administration in time range)   Pharmacy Consult (has no administration in time range)   vancomycin (VANCOCIN) capsule 500 mg (has no administration in time range)   cholestyramine light packet 4 g (has no administration in time range)   pantoprazole (PROTONIX) injection 80 mg (80 mg Intravenous Given 11/8/21 0941)   lactated ringers bolus 1,000 mL (1,000 mL  Intravenous New Bag 11/8/21 0942)   HYDROmorphone (DILAUDID) injection 1 mg (1 mg Intravenous Given 11/8/21 0941)   ondansetron (ZOFRAN) injection 4 mg (4 mg Intravenous Given 11/8/21 0941)   iopamidol (ISOVUE-370) 76 % injection 100 mL (100 mL Intravenous Given 11/8/21 1018)     Labs Reviewed   COMPREHENSIVE METABOLIC PANEL - Abnormal; Notable for the following components:       Result Value    Glucose 121 (*)     Sodium 125 (*)     Chloride 85 (*)     Albumin 3.10 (*)     Anion Gap 17.0 (*)     All other components within normal limits    Narrative:     GFR Normal >60  Chronic Kidney Disease <60  Kidney Failure <15     LIPASE - Abnormal; Notable for the following components:    Lipase 11 (*)     All other components within normal limits   URINALYSIS W/ MICROSCOPIC IF INDICATED (NO CULTURE) - Abnormal; Notable for the following components:    Specific Gravity, UA 1.038 (*)     Ketones, UA 15 mg/dL (1+) (*)     Blood, UA Trace (*)     All other components within normal limits   PROTIME-INR - Abnormal; Notable for the following components:    Protime 12.4 (*)     INR 1.13 (*)     All other components within normal limits   CBC WITH AUTO DIFFERENTIAL - Abnormal; Notable for the following components:    WBC 13.90 (*)     MCH 33.6 (*)     Platelets 467 (*)     All other components within normal limits    Narrative:     The previously reported component NRBC is no longer being reported. Previous result was 0.1 /100 WBC (Reference Range: 0.0-0.2 /100 WBC) on 11/8/2021 at 0939 EST.   SEDIMENTATION RATE - Abnormal; Notable for the following components:    Sed Rate 56 (*)     All other components within normal limits   C-REACTIVE PROTEIN - Abnormal; Notable for the following components:    C-Reactive Protein 20.78 (*)     All other components within normal limits   MANUAL DIFFERENTIAL - Abnormal; Notable for the following components:    Lymphocyte % 6.0 (*)     Bands %  29.0 (*)     Neutrophils Absolute 12.23 (*)     All  other components within normal limits   URINALYSIS, MICROSCOPIC ONLY - Abnormal; Notable for the following components:    RBC, UA 0-2 (*)     WBC, UA 0-2 (*)     All other components within normal limits   COVID-19,CEPHEID/TAMMIE/BDMAX,COR/AYAH/PAD/YVONNE IN-HOUSE,NP SWAB IN TRANSPORT MEDIA 3-4 HR TAT, RT-PCR - Normal    Narrative:     Fact sheet for providers: https://www.fda.gov/media/726664/download     Fact sheet for patients: https://www.fda.gov/media/144603/download  Fact sheet for providers: https://www.fda.gov/media/151080/download    Fact sheet for patients: https://www.fda.gov/media/028692/download    Test performed by PCR.   POC LACTATE - Normal   POC LACTATE - Normal   COVID PRE-OP / PRE-PROCEDURE SCREENING ORDER (NO ISOLATION)    Narrative:     The following orders were created for panel order COVID PRE-OP / PRE-PROCEDURE SCREENING ORDER (NO ISOLATION) - Swab, Nasopharynx.  Procedure                               Abnormality         Status                     ---------                               -----------         ------                     COVID-19,CEPHEID/TAMMIE/BD...[[id number]]  Normal              Final result                 Please view results for these tests on the individual orders.   BLOOD CULTURE   BLOOD CULTURE   RAINBOW DRAW    Narrative:     The following orders were created for panel order [place] Draw.  Procedure                               Abnormality         Status                     ---------                               -----------         ------                     Green Top (Gel)[[id number]]                                  Final result               Lavender Top[[id number]]                                     Final result               Gold Top - SST[[id number]]                                   Final result               Light Blue Top[[id number]]                                   Final result                 Please view results for these tests on the individual orders.   SCAN SLIDE    TYPE AND SCREEN   BB ARMBAND CHECK   CBC AND DIFFERENTIAL    Narrative:     The following orders were created for panel order CBC & Differential.  Procedure                               Abnormality         Status                     ---------                               -----------         ------                     CBC Auto Differential[[id number]]        Abnormal            Final result               Scan Slide[[id number]]                                       Final result                 Please view results for these tests on the individual orders.   GREEN TOP   LAVENDER TOP   GOLD TOP - [facility], [place]   LIGHT BLUE TOP                                          MDM  Number of Diagnoses or Management Options  Bandemia  C. difficile colitis  Hyponatremia  Diagnosis management comments: Chart Review: 4/21/2021 patient was seen by primary care for hematuria.  Comorbidity: Past Medical History:  No date: Annual physical exam      Comment:  Impression: doing well, vaccines currrent Age specific                anticipatory guidance and warning signs discussed. Diet,                exercise, and lifestyle modification discussed. Safety,                seatbelts, and routine screening examinations discussed.                Discussed self-examinations.  No date: Annual visit for general adult medical examination with   abnormal findings      Comment:  Impression: doing well, vaccines current Age specific                anticipatory guidance and warning signs discussed. Diet,                exercise, and lifestyle modification discussed. Safety,                seatbelts, and routine screening examinations discussed.                Discussed self-examinations.  No date: C. difficile diarrhea      Comment:  Impression: improved on vancomycin / conrinue add                probiotics gi Follow up upcoming Call / return if unable                to keep fluids down, if recurrant symptoms.  No date: Chronic ulcerative proctitis without  complications (CMS/[facility])      Comment:  Impression: doing well / followed by GI  No date: Colon cancer screening      Comment:  Impression: colonoscopy current followed by GI  No date: ED (erectile dysfunction)  No date: Generalized osteoarthritis      Comment:  Impression: ice 20 minutes bid nsaids Rehabilitation                exercises discussed. Consider imaging if persistent                symptoms.  No date: History of chickenpox  No date: History of tobacco use  No date: Hyperlipidemia      Comment:  Impression: Discussed diet, exercise, lifestyle                modification. moderate elevation, he's working hard on                diet, exercise, exercising daily improved on red yeast                rice at 2400mg daily Follow up recheck -------------                Stable Compliant with meds Is getting adequate diet and                exercise Goals developed at last visit were met Care                management needs are self addressed.  No date: Onychomycosis      Comment:  Impression: Topical care discussed. Call / return if                persistent symptoms.  No date: Overweight (BMI 25.0-29.9)  No date: Prostatitis, acute      Comment:  Impression: much improved / resolving crvirs Increase                fluid intake. nsaids Follow up recheck Call / return if                fever, worsening symptoms.  No date: Screening PSA (prostate specific antigen)      Comment:  Impression: renuka with mild, symmetric enlargement psa                normal  No date: UC (ulcerative colitis) (CMS/[facility])      Comment:  Impression: doing well on asachol followed by GI  Imaging: Was interpreted by physician and reviewed by myself: CT Abdomen Pelvis With Contrast   Final Result    1.Wall thickening and hyperemia of colon, suggesting pancolitis.    2.Sigmoid diverticulosis.    3.Several prominent urinary bladder diverticula.    4.Bilateral L5 pars defects, with grade 1 anterolisthesis of L5 on S1.         Electronically Signed  By-German Chin MD On:11/8/2021 10:39 AM    This report was finalized on [id number] by  German Chin MD.    Patient undressed and placed in gown for exam.  Appropriate PPE worn during patient exam. 66-year-old male with history of ulcerative colitis presents with a 1 month history of left lower quadrant pain that has increased in intensity in the last 11 days.  He reports anorexia for the last 5 days.  He reports a 2-week history of melena.  He denies taking blood thinners.  He reports has been taking prednisone for 1 week.  He does report some associated nausea but denies vomiting.  He reports that he has been seen by his gastroenterologist and has taken a stool sample there and is pending results.  He reports he had a colonoscopy in March and reports that his ulcerative colitis was in remission.  He denies history of colon cancer.  His gastroenterologist is Dr. Goodson.  Patient was noted to be point tender in left lower quadrant.  IV established and labs obtained.  Lactated Ringer's 1 L bolus, Dilaudid 1 mg IV, Zofran 4 mg IV, and Protonix 80 mg IV.  A CT of abdomen pelvis with IV contrast obtained with the above findings. White blood cell count 13,900 platelets 467 hemoglobin 17.4 hematocrit 48.829% bands sodium 125 potassium 4.1 chloride 85 CO2 23 BUN 17 creatinine 1 glucose 121 sed rate 56 CRP 20.78 lipase 11 urine was significant for blood, otherwise no infectious process noted.  Type and screen obtained.  Blood cultures and lactic pending.  Spoke with GI who would like p.o. vancomycin, IV Flagyl, and WelChol started per C. difficile protocol.  Hospitalist was paged for admission.  Spoke with MARISOL Prieto who accepted admission on behalf of Dr. Forrester.  Upon reassessment, patient reports relief with medications given.  He is pink warm and dry no distress noted.  His vital signs are stable.    Disposition/Treatment: Discussed results with patient, verbalized understanding. Agreeable with plan of care.   Patient was stable upon admission.       Part of this note may be an electronic transcription/translation of spoken language to printed text using the Dragon Dictation System.            Amount and/or Complexity of Data Reviewed  Clinical lab tests: reviewed  Tests in the radiology section of CPT®: reviewed  Decide to obtain previous medical records or to obtain history from someone other than the patient: yes (Following are from [facility]:    C. difficile toxin + 11/5/2021 sodium 135 potassium 4.7 chloride 95 CO2 26 BUN 13 creatinine 1.05 glucose 120 white blood cell count 11.3 platelets 365 hemoglobin 16.2 hematocrit 45.4 sed rate 8 )  Discuss the patient with other providers: (Spoke with Dr. Meyers who would like patient to be placed on IV Flagyl, p.o. vancomycin, and p.o. WelChol 3 times daily for treatment of C. difficile colitis.  He would also like patient to stop taking prednisone.)    Patient Progress  Patient progress: stable      Final diagnoses:   C. difficile colitis   Hyponatremia   Bandemia       ED Disposition  ED Disposition     ED Disposition Condition Comment    Decision to Admit  Level of Care: Telemetry [5]   Admitting Physician: JANET DELGADO [[id number]]   Attending Physician: JANET DELGADO [[id number]]            No follow-up provider specified.       Medication List      No changes were made to your prescriptions during this visit.          [place]and[place] William, APRN  11/08/21 [id number]

## 2021-11-08 NOTE — CONSULTS
"GI CONSULT  NOTE:    Referring Provider:  Dr. Forrester    Chief complaint: Diarrhea, abdominal pain    Subjective  \"I have had persistent diarrhea and abdominal pain\"    History of present illness: Gabriel Sandhu is a 66 y.o. male who has a history of pan ulcerative colitis diagnosed 15 years ago and currently managed on azathioprine and Lialda.  He started feeling poorly approximately 5 weeks ago with some minor rectal bleeding intermittently and twinges of abdominal discomfort.  Diarrhea started 10 to 11 days ago with some left lower quadrant discomfort and worsening diarrhea similar to previous ulcerative colitis flares.  He was started on prednisone last week but has had worsening symptoms since then.  He is having diarrhea at least 8 times per day with nocturnal diarrhea and bright red rectal bleeding.  No melena.  He is having left lower quadrant discomfort without fevers or chills.  Nausea but vomiting is rare.  He denies any recent antibiotic use or ill contacts.  He does report about an 8 pound weight loss.      Endo History:  4/2021 colonoscopy by Dr. Goodson -quiescent UC, polyps were hyperplastic    Past Medical History:  Past Medical History:   Diagnosis Date   • Annual physical exam     Impression: doing well, vaccines currrent Age specific anticipatory guidance and warning signs discussed. Diet, exercise, and lifestyle modification discussed. Safety, seatbelts, and routine screening examinations discussed. Discussed self-examinations.   • Annual visit for general adult medical examination with abnormal findings     Impression: doing well, vaccines current Age specific anticipatory guidance and warning signs discussed. Diet, exercise, and lifestyle modification discussed. Safety, seatbelts, and routine screening examinations discussed. Discussed self-examinations.   • C. 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 Consider imaging if persistent symptoms.   • History of chickenpox    • History of tobacco use    • Hyperlipidemia     Impression: Discussed diet, exercise, lifestyle modification. moderate elevation, he's working hard on diet, exercise, exercising daily improved on red yeast rice at 2400mg daily Follow up recheck ------------- Stable Compliant with meds Is getting adequate diet and exercise Goals developed at last visit were met Care management needs are self addressed.   • Onychomycosis     Impression: Topical care discussed. Call / return if persistent symptoms.   • Overweight (BMI 25.0-29.9)    • Prostatitis, acute    • Screening PSA (prostate specific antigen)     Impression: renuka with mild, symmetric enlargement psa normal   • UC (ulcerative colitis) (HCC)        Past Surgical History:  Colonoscopy    Social History:  Social History     Tobacco Use   • Smoking status: Former Smoker     Packs/day: 2.50     Years: 21.00     Pack years: 52.50     Types: Cigarettes     Start date:      Quit date:      Years since quittin.8   • Smokeless tobacco: Never Used   Vaping Use   • Vaping Use: Never used   Substance Use Topics   • Alcohol use: Yes     Comment: Drinks wine, Occasional alcohol use.   • Drug use: No   Denies tobacco or alcohol abuse    Family History:  Family History   Problem Relation Age of Onset   • Alzheimer's disease Father    • Dementia Father    • No Known Problems Mother    • No Known Problems Sister    • No Known Problems Brother    • No Known Problems Daughter    • No Known Problems Sister    • No Known Problems Brother    • No Known Problems Brother    No family history of colorectal cancer or inflammatory bowel disease    Medications:  (Not in a hospital admission)      Scheduled Meds:cholestyramine light, 1  packet, Oral, Q8H  metroNIDAZOLE, 500 mg, Intravenous, Q8H  sodium chloride, 10 mL, Intravenous, Q12H  vancomycin, 500 mg, Oral, Q6H      Continuous Infusions:Pharmacy Consult,   sodium chloride, 100 mL/hr      PRN Meds:.•  acetaminophen **OR** acetaminophen **OR** acetaminophen  •  aluminum-magnesium hydroxide-simethicone  •  magnesium sulfate **OR** magnesium sulfate in D5W 1g/100mL (PREMIX)  •  melatonin  •  nitroglycerin  •  ondansetron **OR** ondansetron  •  Pharmacy Consult  •  potassium chloride **OR** potassium chloride **OR** potassium chloride  •  sodium chloride  •  sodium chloride    ALLERGIES:  Patient has no known allergies.    ROS:  The following systems were reviewed   Constitution:  No fevers, chills, no unintentional weight loss  Skin: no rash, no jaundice  Eyes:  No blurry vision, no eye pain  HENT:  No change in hearing or smell  Resp:  No dyspnea or cough  CV:  No chest pain or palpitations  :  No dysuria, hematuria  Musculoskeletal:  No leg cramps or arthralgias  Neuro:  No tremor, no numbness  Psych:  No depression or confusion    Objective Resting in bed, no acute distress, family in room    Vital Signs:   Vitals:    11/08/21 0951 11/08/21 1001 11/08/21 1104 11/08/21 1131   BP:  101/66 112/67 112/68   BP Location:       Patient Position:       Pulse: 95 92 89 92   Resp:       Temp:       TempSrc:       SpO2: 94% 95% 96% 96%   Weight:       Height:           Physical Exam:       General Appearance:    Awake and alert, in no acute distress   Head:    Normocephalic, without obvious abnormality, atraumatic   Throat:   No oral lesions, no thrush, oral mucosa moist   Lungs:     Respirations regular, even and unlabored   Chest Wall:    No abnormalities observed   Abdomen:     Soft, lower abdominal tenderness without rebound or guarding, nondistended   Rectal:     Deferred   Extremities:   Moves all extremities, no edema, no cyanosis       Skin:   No rash, no jaundice, normal palpation        Neurologic:   Cranial nerves 2 - 12 grossly intac       Results Review:   I reviewed the patient's labs and imaging.  CBC    Results from last 7 days   Lab Units 11/08/21  0926   WBC 10*3/mm3 13.90*   HEMOGLOBIN g/dL 17.4   PLATELETS 10*3/mm3 467*     CMP   Results from last 7 days   Lab Units 11/08/21  0924   SODIUM mmol/L 125*   POTASSIUM mmol/L 4.1   CHLORIDE mmol/L 85*   CO2 mmol/L 23.0   BUN mg/dL 17   CREATININE mg/dL 1.00   GLUCOSE mg/dL 121*   ALBUMIN g/dL 3.10*   BILIRUBIN mg/dL 0.9   ALK PHOS U/L 91   AST (SGOT) U/L 25   ALT (SGPT) U/L 35   LIPASE U/L 11*     Cr Clearance Estimated Creatinine Clearance: 73.1 mL/min (by C-G formula based on SCr of 1 mg/dL).  Coag   Results from last 7 days   Lab Units 11/08/21  0925   INR  1.13*     HbA1C No results found for: HGBA1C  Blood Glucose No results found for: POCGLU  Infection     UA    Results from last 7 days   Lab Units 11/08/21  1104   NITRITE UA  Negative   WBC UA /HPF 0-2*   BACTERIA UA /HPF None Seen   SQUAM EPITHEL UA /HPF None Seen     Radiology(recent) CT Abdomen Pelvis With Contrast    Result Date: 11/8/2021  1.Wall thickening and hyperemia of colon, suggesting pancolitis. 2.Sigmoid diverticulosis. 3.Several prominent urinary bladder diverticula. 4.Bilateral L5 pars defects, with grade 1 anterolisthesis of L5 on S1.  Electronically Signed By-German Chin MD On:11/8/2021 10:39 AM This report was finalized on [id number] by  German Chin MD.         ASSESSMENT:  Diarrhea with rectal bleeding  Left-sided abdominal pain  History of pan ulcerative colitis -on azathioprine/Lialda  Hyponatremia    PLAN:  Patient presents with persistent diarrhea and rectal bleeding with lower abdominal pain despite simply starting prednisone for possible ulcerative colitis flare.  Symptoms worsening with concern for underlying infection.  Prednisone stopped.  On IV Flagyl and oral vancomycin.  Await stool studies.  Continue supportive care with IV fluids and  analgesics as needed.  Will follow.    I discussed the patients findings and my recommendations with the patient.    We appreciate the referral    Electronically signed by KULDEEP Prince, 11/08/21, 12:32 PM EST.

## 2021-11-09 NOTE — DISCHARGE PLACEMENT REQUEST
"Meredith Sandhu (66 y.o. Male)             Date of Birth Social Security Number Address Home Phone MRN    1955  [address] [facility]  [place] [facility] [phone] [id number]    Yazdanism Marital Status             None        Admission Date Admission Type Admitting Provider Attending Provider Department, Room/Bed    11/8/21 Emergency Juana Forrester MD Hall, Kelli G, MD [facility] 2C MEDICAL INPATIENT, 244/1    Discharge Date Discharge Disposition Discharge Destination                         Attending Provider: Juana Forrester MD    Allergies: No Known Allergies    Isolation: Spore   Infection: C.difficile (rule out) (11/08/21), C.difficile (11/09/21)   Code Status: CPR   Advance Care Planning Activity    Ht: 172.7 cm (68\")   Wt: 70.4 kg (155 lb 2.6 oz)    Admission Cmt: None   Principal Problem: C. difficile colitis [A04.72]                 Active Insurance as of 11/8/2021     Primary Coverage     Payor Plan Insurance Group Employer/Plan Group    MEDICARE MEDICARE A & B      Payor Plan Address Payor Plan Phone Number Payor Plan Fax Number Effective Dates    [address] [phone]  7/1/2020 - None Entered    [facility] [id number]       Subscriber Name Subscriber Birth Date Member ID       MEREDITH SANDHU 1955 [id number]           Secondary Coverage     Payor Plan Insurance Group Employer/Plan Group    AARP  SUP AARP HEALTH CARE OPTIONS PLAN G     Payor Plan Address Payor Plan Phone Number Payor Plan Fax Number Effective Dates    [facility] [phone]  12/1/2020 - None Entered    [address]       [facility] [address]       Subscriber Name Subscriber Birth Date Member ID       MEREDITH SANDHU 1955 [id number]                 Emergency Contacts      (Rel.) Home Phone Work Phone Mobile Phone    Radha Neri (Spouse) [phone] -- [phone]               History & Physical      Juana Forrester MD at 11/08/21 [facility]9              [facility]" [facility] Medicine Services      Patient Name: Gabriel Sandhu  : 1955  MRN: [id number]  Primary Care Physician:  Jose D Antunez MD  Date of admission: 2021      Subjective      Chief Complaint:  Left lower quadrant pain, diarrhea, rectal bleeding    History of Present Illness: Gabriel Sandhu is a 66 y.o. male with a history of ulcerative colitis who presented to [facility] ED on 2021 complaining of left lower quadrant pain, diarrhea, and rectal bleeding. The patient states his pain started approximately 1 month ago, but has gotten progressively worse over the past 10 days. He states the pain radiates into his middle lower abdomen. He reports associated diarrhea, melena, nausea, and poor appetite. He denies any fever or chills. He reports a history of C. diff a couple years ago and states it started the same way. He denies any other complaints. He denies any exacerbating or alleviating factors. He denies any recent antibiotic use. He reportedly had stool studies done last week at his gastroenterologist's office. Reportedly per [facility], the patient tested positive for C. diff on 21.     Workup in the ER revealed pancolitis and hyponatremia. The patient was given Dilaudid 1 mg IV, Zofran 4 mg IV, Protonix 80 mg IV, and 1 liter LR. GI was consulted and recommended starting the patient on IV Flagyl, PO vancomycin, and PO Welchol. The patient was admitted to the Hospitalist group for further treatment.         Review of Systems   Constitutional: Positive for decreased appetite. Negative for chills and fever.   Gastrointestinal: Positive for abdominal pain, diarrhea, melena and nausea. Negative for vomiting.   All other systems reviewed and are negative.       Personal History     Past Medical History:   Diagnosis Date   • Annual physical exam     Impression: doing well, vaccines currrent Age specific anticipatory guidance and warning signs discussed. Diet, exercise, and lifestyle  modification discussed. Safety, seatbelts, and routine screening examinations discussed. Discussed self-examinations.   • Annual visit for general adult medical examination with abnormal findings     Impression: doing well, vaccines current Age specific anticipatory guidance and warning signs discussed. Diet, exercise, and lifestyle modification discussed. Safety, seatbelts, and routine screening examinations discussed. Discussed self-examinations.   • C. 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 Consider imaging if persistent symptoms.   • History of chickenpox    • History of tobacco use    • Hyperlipidemia     Impression: Discussed diet, exercise, lifestyle modification. moderate elevation, he's working hard on diet, exercise, exercising daily improved on red yeast rice at 2400mg daily Follow up recheck ------------- Stable Compliant with meds Is getting adequate diet and exercise Goals developed at last visit were met Care management needs are self addressed.   • Onychomycosis     Impression: Topical care discussed. Call / return if persistent symptoms.   • Overweight (BMI 25.0-29.9)    • Prostatitis, acute    • Screening PSA (prostate specific antigen)     Impression: renuka with mild, symmetric enlargement psa normal   • UC (ulcerative colitis) (HCC)        History reviewed. No pertinent surgical history.    Family History: family history includes Alzheimer's disease in his father; Dementia in his father; No Known Problems in his brother, brother, brother, daughter, mother, sister, and sister. Otherwise pertinent FHx was reviewed and not pertinent to current issue.    Social History:  reports that he quit smoking about 31 years ago. His smoking use  included cigarettes. He started smoking about 52 years ago. He has a 52.50 pack-year smoking history. He has never used smokeless tobacco. He reports current alcohol use. He reports that he does not use drugs.    Home Medications:  Prior to Admission Medications     Prescriptions Last Dose Informant Patient Reported? Taking?    azaTHIOprine (IMURAN) 50 MG tablet   Yes No    Calcium Carb-Cholecalciferol (CALCIUM 600-D PO)   Yes No    Take 1 capsule by mouth Daily.    folic acid (FOLVITE) 800 MCG tablet   Yes No    Take  by mouth Daily.    Glucosamine HCl (GLUCOSAMINE PO)   Yes No    Take  by mouth.    mesalamine (LIALDA) 1.2 g EC tablet   Yes No    Take  by mouth Daily.    Multiple Vitamins-Minerals (CENTRUM ADULTS) tablet   Yes No    Take  by mouth Daily.    Red Yeast Rice Extract 600 MG capsule   Yes No    Take 2 capsules by mouth Daily.    vardenafil (LEVITRA) 20 MG tablet   No No    TAKE 1 TABLET BY MOUTH ONCE DAILY TAKE  1  HOUR  PRIOR  TO  SEXUAL  ACTIVITY  (NO  MORE  THAN  ONCE  A  DAY)    vardenafil (Levitra) 20 MG tablet   No No    Take 1 tablet by mouth Daily As Needed for Erectile Dysfunction. Take one hour prior to sexual activity            Allergies:  No Known Allergies    Objective      Vitals:   Temp:  [98.3 °F (36.8 °C)] 98.3 °F (36.8 °C)  Heart Rate:  [] 92  Resp:  [20] 20  BP: (101-118)/(66-76) 112/68    Physical Exam  Vitals reviewed.   HENT:      Head: Normocephalic and atraumatic.      Nose: Nose normal.      Mouth/Throat:      Mouth: Mucous membranes are moist.   Eyes:      Extraocular Movements: Extraocular movements intact.      Conjunctiva/sclera: Conjunctivae normal.      Pupils: Pupils are equal, round, and reactive to light.   Cardiovascular:      Rate and Rhythm: Normal rate and regular rhythm.      Pulses: Normal pulses.      Heart sounds: Normal heart sounds.   Pulmonary:      Effort: Pulmonary effort is normal.      Breath sounds: Normal breath sounds.   Abdominal:      General:  Bowel sounds are normal. There is no distension.      Palpations: Abdomen is soft.      Tenderness: There is abdominal tenderness in the left lower quadrant.   Musculoskeletal:         General: Normal range of motion.      Cervical back: Normal range of motion.      Right lower leg: No edema.      Left lower leg: No edema.   Skin:     General: Skin is warm and dry.      Capillary Refill: Capillary refill takes less than 2 seconds.   Neurological:      General: No focal deficit present.      Mental Status: He is alert and oriented to person, place, and time.   Psychiatric:         Mood and Affect: Mood normal.         Behavior: Behavior normal.          Result Review    Result Review:  I have personally reviewed the results from the time of this admission to 11/8/2021 12:29 EST and agree with these findings:  [x]  Laboratory  [x]  Microbiology  [x]  Radiology  []  EKG/Telemetry   []  Cardiology/Vascular   []  Pathology  [x]  Old records  []  Other:    Most notable findings include:   WBC 13.9, Na 125, anion gap 17.0, lactate 0.9, ESR 56, CRP 20.78    Covid-19 negative    CT abd/pelvis:  1.Wall thickening and hyperemia of colon, suggesting pancolitis.  2.Sigmoid diverticulosis.  3.Several prominent urinary bladder diverticula.  4.Bilateral L5 pars defects, with grade 1 anterolisthesis of L5 on S1.    Blood cultures pending      Assessment/Plan        Active Hospital Problems:  Active Hospital Problems    Diagnosis    • **C. difficile colitis    • Gastrointestinal hemorrhage    • Acute abdominal pain in left lower quadrant    • Hyponatremia    • Ulcerative colitis (HCC)      Plan:     C. difficile colitis  History of Ulcerative colitis and C. diff  Gastrointestinal hemorrhage with rectal bleeding  Acute abdominal pain in left lower quadrant  -contact/spore isolation  -PO vancomycin q6h x 10 days  -IV Flagyl q8h  -Welchol q8h  -avoid PPIs  -GI consulted  -clear liquid diet  -PRN analgesia and antiemetics  -monitor H&H,  transfuse for hgb <7.0 or hemodynamic instability     Hyponatremia  -likely dehydration secondary to diarrhea  -Na 125 on admission  -IVF  -monitor BMP and I&Os        DVT prophylaxis:  Mechanical DVT prophylaxis orders are present.    CODE STATUS:    Code Status (Patient has no pulse and is not breathing): CPR (Attempt to Resuscitate)  Medical Interventions (Patient has pulse or is breathing): Full Support    Admission Status:  I believe this patient meets inpatient status.    I discussed the patient's findings and my recommendations with patient.    This patient has been examined wearing appropriate Personal Protective Equipment. 11/08/21      Signature: Electronically signed by KULDEEP Pittman, 11/08/21, 2:03 PM EST.      Attending attestation:    The patient is a 66-year-old gentleman with ulcerative colitis who developed diarrhea and bright red blood per rectum associated with left lower quadrant abdominal pain and has subsequently tested positive for C. difficile colitis.  He has already been started on oral vancomycin, IV Flagyl and WelChol.  He reports feeling somewhat better the evening after admission.  Physical exam: Vital signs and nurses notes reviewed.  Sclera anicteric, mucous membranes moist, awake and alert in no acute distress; nontoxic-appearing; skin warm and dry.  CT scan abdomen and pelvis reviewed and showed pancolitis.  Assessment and plan: Acute abdominal pain, diarrhea and acute lower GI bleed concerning for C. difficile colitis in this patient with ulcerative colitis  -Continue present management and per GI.  The patient was seen and examined and chart reviewed on 11/8/2021.  I agree with the assessment and plan of the APRN.  Juana Forrester MD  Electronically signed by Juana Forrester MD, 11/09/21, 7:47 AM EST.      Electronically signed by Juana Forrester MD at 11/09/21 9831          Physician Progress Notes (last 24 hours)      Leslie Storey APRN at 11/09/21 0850     Attestation signed  "by Mack Meyers MD at 11/09/21 5632    I have reviewed this documentation and agree.  He reports feeling better today.  He has had 2 or 3 thin bowel movements since midnight.  He denies abdominal pain nausea or vomiting.  He is having less bleeding.  On exam his abdomen is nondistended good bowel sounds and he is nontender.  White blood count is 12.3.  Hemoglobin 14.6.  Platelets normal.  INR 1.1.  C. difficile PCR is positive.  We will continue oral vancomycin and IV Flagyl.  We will continue cholestyramine.  Hold prednisone.  Continue mesalamine.  Possible discharge home tomorrow if stable.  Mack Meyers M.D.                     LOS: 1 day   Patient Care Team:  Jose D Antunez MD as PCP - General (Family Medicine)  German Goodson MD as Consulting Physician (Gastroenterology)      Subjective \" Less diarrhea and feeling better today\"    Interval History:     Subjective: Frequency of diarrhea has improved although thin and mucousy.  Less rectal bleeding.  No abdominal pain denies nausea or vomiting.  Tolerating clear liquids.  No fevers.      ROS:   No chest pain, shortness of breath, or cough.        Medication Review:     Current Facility-Administered Medications:   •  acetaminophen (TYLENOL) tablet 650 mg, 650 mg, Oral, Q4H PRN **OR** acetaminophen (TYLENOL) 160 MG/5ML solution 650 mg, 650 mg, Oral, Q4H PRN **OR** acetaminophen (TYLENOL) suppository 650 mg, 650 mg, Rectal, Q4H PRN, Mahan, Ida, APRN  •  aluminum-magnesium hydroxide-simethicone (MAALOX MAX) 400-400-40 MG/5ML suspension 15 mL, 15 mL, Oral, Q6H PRN, Mahan, Ida, APRN  •  cholestyramine light packet 4 g, 1 packet, Oral, Q8H, Mack Meyers MD, 4 g at 11/09/21 8317  •  Magnesium Sulfate 2 gram infusion - Mg less than or equal to 1.5 mg/dL, 2 g, Intravenous, PRN **OR** Magnesium Sulfate 1 gram infusion - Mg 1.6-1.9 mg/dL, 1 g, Intravenous, PRN, Mahan, Ida, APRN  •  melatonin tablet 5 mg, 5 mg, Oral, Nightly PRN, " "Ida Mahan, APRN  •  metroNIDAZOLE (FLAGYL) 500 mg/100mL IVPB, 500 mg, Intravenous, Q8H, Mack Meyers MD, Stopped at 11/09/21 0516  •  nitroglycerin (NITROSTAT) SL tablet 0.4 mg, 0.4 mg, Sublingual, Q5 Min PRN, Mahan, Ida, APRN  •  ondansetron (ZOFRAN) tablet 4 mg, 4 mg, Oral, Q6H PRN **OR** ondansetron (ZOFRAN) injection 4 mg, 4 mg, Intravenous, Q6H PRN, Mahan, Ida, APRN  •  Pharmacy Consult, , Does not apply, Continuous PRN, Mack Meyers MD  •  potassium chloride (K-DUR,KLOR-CON) CR tablet 40 mEq, 40 mEq, Oral, PRN **OR** potassium chloride (KLOR-CON) packet 40 mEq, 40 mEq, Oral, PRN **OR** potassium chloride 10 mEq in 100 mL IVPB, 10 mEq, Intravenous, Q1H PRN, Mahan, Ida, APRN  •  sodium chloride 0.9 % flush 10 mL, 10 mL, Intravenous, PRN, Alexandria William, APRN  •  sodium chloride 0.9 % flush 10 mL, 10 mL, Intravenous, Q12H, Mahan, Ida, APRN, 10 mL at 11/09/21 0820  •  sodium chloride 0.9 % flush 10 mL, 10 mL, Intravenous, PRN, Mahan, Ida, APRN  •  sodium chloride 0.9 % infusion, 100 mL/hr, Intravenous, Continuous, Mahan, Ida, APRN, Last Rate: 100 mL/hr at 11/08/21 2357, 100 mL/hr at 11/08/21 2357  •  traMADol (ULTRAM) tablet 50 mg, 50 mg, Oral, Q6H PRN, Mahan, Ida, APRN  •  vancomycin (VANCOCIN) capsule 500 mg, 500 mg, Oral, Q6H, Mack Meyers MD, 500 mg at 11/09/21 0540      Objective Resting in bed, no acute distress, no family present    Vital Signs  Vitals:    11/08/21 1900 11/08/21 2300 11/09/21 0300 11/09/21 0720   BP: 115/69 115/67 116/69 117/70   BP Location: Left arm Left arm Left arm Left arm   Patient Position: Lying Lying Lying Lying   Pulse: 88 93 91 96   Resp: 18 18 18    Temp: 98.3 °F (36.8 °C) 98.1 °F (36.7 °C) 98.5 °F (36.9 °C)    TempSrc: Oral Oral Oral    SpO2: 96% 95% 95% 96%   Weight:   70.4 kg (155 lb 2.6 oz)    Height:  172.7 cm (68\")         Physical Exam:     General Appearance:    Awake and alert, in no acute distress   Head:    " Normocephalic, without obvious abnormality   Eyes:          Conjunctivae normal, anicteric sclera   Throat:   No oral lesions, no thrush, oral mucosa moist   Neck:    supple, no JVD   Lungs:     respirations regular, even and unlabored       Rectal:     Deferred   Extremities:   No edema, no cyanosis   Skin:   No bruising or rash, no jaundice        Results Review:    CBC    Results from last 7 days   Lab Units 11/09/21  0357 11/08/21  2358 11/08/21  1614 11/08/21  0926   WBC 10*3/mm3 12.30*  --   --  13.90*   HEMOGLOBIN g/dL 14.6 13.5 14.5 17.4   PLATELETS 10*3/mm3 357  --   --  467*     CMP   Results from last 7 days   Lab Units 11/09/21  0357 11/08/21  0924   SODIUM mmol/L 127* 125*   POTASSIUM mmol/L 4.1 4.1   CHLORIDE mmol/L 89* 85*   CO2 mmol/L 22.0 23.0   BUN mg/dL 11 17   CREATININE mg/dL 0.91 1.00   GLUCOSE mg/dL 65 121*   ALBUMIN g/dL 2.80* 3.10*   BILIRUBIN mg/dL 0.5 0.9   ALK PHOS U/L 69 91   AST (SGOT) U/L 24 25   ALT (SGPT) U/L 28 35   MAGNESIUM mg/dL 2.4  --    LIPASE U/L  --  11*     Cr Clearance Estimated Creatinine Clearance: 79.5 mL/min (by C-G formula based on SCr of 0.91 mg/dL).  Coag   Results from last 7 days   Lab Units 11/08/21  0925   INR  1.13*     HbA1C No results found for: HGBA1C  Blood Glucose No results found for: POCGLU  Infection     UA    Results from last 7 days   Lab Units 11/08/21  1104   NITRITE UA  Negative   WBC UA /HPF 0-2*   BACTERIA UA /HPF None Seen   SQUAM EPITHEL UA /HPF None Seen     Radiology(recent) CT Abdomen Pelvis With Contrast    Result Date: 11/8/2021  1.Wall thickening and hyperemia of colon, suggesting pancolitis. 2.Sigmoid diverticulosis. 3.Several prominent urinary bladder diverticula. 4.Bilateral L5 pars defects, with grade 1 anterolisthesis of L5 on S1.  Electronically Signed By-German Chin MD On:11/8/2021 10:39 AM This report was finalized on [id number] by  German Chin MD.         Assessment/Plan   C. difficile infection  Diarrhea with  rectal bleeding -improving  Left-sided abdominal pain  History of pan ulcerative colitis -on azathioprine/Lialda  Hyponatremia     PLAN:  Stool PCR negative and C. difficile positive.  He has improved on oral vancomycin and IV Flagyl.  On cholestyramine.  Prednisone on hold with identified infectious process.  Okay to advance diet as tolerated.  Continue supportive care and will follow.  Hopefully home soon.  Hyponatremia improved.  Continue supportive care.    Electronically signed by KULDEEP Prince, 11/09/21, 8:29 AM EST.           Electronically signed by Mack Meyers MD at 11/09/21 0415

## 2021-11-09 NOTE — PLAN OF CARE
Problem: Adult Inpatient Plan of Care  Goal: Plan of Care Review  Outcome: Ongoing, Progressing  Flowsheets (Taken 11/9/2021 1429)  Plan of Care Reviewed With: patient  Goal: Patient-Specific Goal (Individualized)  Outcome: Ongoing, Progressing  Goal: Absence of Hospital-Acquired Illness or Injury  Outcome: Ongoing, Progressing  Intervention: Identify and Manage Fall Risk  Recent Flowsheet Documentation  Taken 11/9/2021 1321 by Nuvia Duval RN  Safety Promotion/Fall Prevention:   safety round/check completed   nonskid shoes/slippers when out of bed  Taken 11/9/2021 1109 by Nuvia Duval RN  Safety Promotion/Fall Prevention:   safety round/check completed   nonskid shoes/slippers when out of bed  Taken 11/9/2021 0908 by Nuvia Duval RN  Safety Promotion/Fall Prevention:   safety round/check completed   nonskid shoes/slippers when out of bed  Taken 11/9/2021 0720 by Nuvia Duval RN  Safety Promotion/Fall Prevention:   safety round/check completed   nonskid shoes/slippers when out of bed  Intervention: Prevent Skin Injury  Recent Flowsheet Documentation  Taken 11/9/2021 0720 by Nuvia Duval RN  Body Position: position maintained  Skin Protection: tubing/devices free from skin contact  Intervention: Prevent Infection  Recent Flowsheet Documentation  Taken 11/9/2021 1321 by Nuvia Duval RN  Infection Prevention:   single patient room provided   rest/sleep promoted   hand hygiene promoted  Taken 11/9/2021 1109 by Nuvia Duval RN  Infection Prevention:   single patient room provided   rest/sleep promoted   hand hygiene promoted  Taken 11/9/2021 0908 by Nuvia Duval RN  Infection Prevention:   single patient room provided   rest/sleep promoted   hand hygiene promoted  Taken 11/9/2021 0720 by Nuvia Duval RN  Infection Prevention:   single patient room provided   rest/sleep promoted   hand hygiene promoted  Goal: Optimal Comfort and Wellbeing  Outcome: Ongoing,  Progressing  Intervention: Provide Person-Centered Care  Recent Flowsheet Documentation  Taken 11/9/2021 0720 by Nuvia Duval RN  Trust Relationship/Rapport:   care explained   thoughts/feelings acknowledged  Goal: Readiness for Transition of Care  Outcome: Ongoing, Progressing     Problem: Adjustment to Illness (Gastrointestinal Bleeding)  Goal: Optimal Coping with Acute Illness  Outcome: Ongoing, Progressing  Intervention: Optimize Psychosocial Response to Unexpected Illness  Recent Flowsheet Documentation  Taken 11/9/2021 0720 by Nuvia Duval RN  Supportive Measures: active listening utilized     Problem: Bleeding (Gastrointestinal Bleeding)  Goal: Hemostasis  Outcome: Ongoing, Progressing   Goal Outcome Evaluation:  Plan of Care Reviewed With: patient         Patient rested throughout the shift. Patient receiving PO Vanc and Flagyl. Will continue to observe.

## 2021-11-09 NOTE — PROGRESS NOTES
[facility] Medicine Services Daily Progress Note    Patient Name: Gabriel Sandhu  : 1955  MRN: [id number]  Primary Care Physician:  Jose D Antunez MD  Date of admission: 2021      Subjective      Chief Complaint: Left lower quadrant abdominal pain, diarrhea and rectal bleeding      Patient Reports   2021: Patient reports improvement in his abdominal pain and diarrhea with only a minimal amount of bright red blood in the stool.  He denies any fever, chills or sweats.  He is tolerating a liquid diet.    ROS   12 point review of systems was reviewed and was negative except for some persistent diarrhea with scant bright red blood per rectum.      Objective      Vitals:   Temp:  [97.7 °F (36.5 °C)-98.5 °F (36.9 °C)] 97.7 °F (36.5 °C)  Heart Rate:  [88-96] 88  Resp:  [16-18] 16  BP: (114-117)/(67-70) 114/70    Physical Exam vital signs and nurses notes reviewed.  Well-developed well-nourished in no acute distress sitting up in bed awake and alert; mucous membranes moist; sclerae anicteric; lungs clear to auscultation bilaterally; CV regular rate and rhythm; abdomen soft nontender nondistended with active bowel sounds; extremities with no edema, cyanosis or calf tenderness; palpable pedal pulses bilaterally; no Silva catheter.       Result Review    Result Review:  I have personally reviewed the results from the time of this admission to 2021 16:12 EST and agree with these findings:  [x]  Laboratory  [x]  Microbiology  [x]  Radiology  []  EKG/Telemetry   []  Cardiology/Vascular   []  Pathology  []  Old records  []  Other:  Most notable findings include: Positive C. difficile PCR; WBC 12.3 and sodium 127        Assessment/Plan      Brief Patient Summary:  Gabriel Sandhu is a 66 y.o. male with ulcerative colitis who was admitted for abdominal pain, diarrhea and bright red bleeding per rectum and was found to have C. difficile colitis.  He is gradually improving with IV Flagyl,  oral vancomycin and cholestyramine.    Current inpatient medications include:  cholestyramine light, 1 packet, Oral, Q8H  folic acid, 800 mcg, Oral, Daily  mesalamine, 4,800 mg, Oral, Daily  metroNIDAZOLE, 500 mg, Oral, Q8H  multivitamin with minerals, 1 tablet, Oral, Daily  sodium chloride, 10 mL, Intravenous, Q12H  vancomycin, 500 mg, Oral, Q6H       Pharmacy Consult,   sodium chloride, 100 mL/hr, Last Rate: 100 mL/hr (11/08/21 2761)         Active Hospital Problems:  Active Hospital Problems    Diagnosis    • **C. difficile colitis    • Gastrointestinal hemorrhage    • Acute abdominal pain in left lower quadrant    • Hyponatremia    • Ulcerative colitis (HCC)      Plan:   C. difficile colitis  History of Ulcerative colitis and C. diff  Gastrointestinal hemorrhage with rectal bleeding  Acute abdominal pain in left lower quadrant  -contact/spore isolation  -PO vancomycin q6h x 10 days  -IV Flagyl q8h  -Welchol q8h  -avoid PPIs  -GI consulted  -clear liquid diet  -PRN analgesia and antiemetics  -monitor H&H, transfuse for hgb <7.0 or hemodynamic instability      Hyponatremia, mild and not clinically significant  -likely dehydration secondary to diarrhea  -Na 125 on admission now improved to 127  -monitor        DVT prophylaxis:  Mechanical DVT prophylaxis orders are present.    CODE STATUS:    Code Status (Patient has no pulse and is not breathing): CPR (Attempt to Resuscitate)  Medical Interventions (Patient has pulse or is breathing): Full Support      Disposition:  I expect patient to be discharged 1 to 2 days.    This patient has been examined wearing appropriate Personal Protective Equipment and discussed with hospital infection control department. 11/09/21      Electronically signed by Juana Forrester MD, 11/09/21, 16:12 EST.  [facility] Hospitalist Team

## 2021-11-09 NOTE — CASE MANAGEMENT/SOCIAL WORK
Continued Stay Note  [facility]     Patient Name: Gabriel Sandhu  MRN: [id number]  Today's Date: 11/9/2021    Admit Date: 11/8/2021     Discharge Plan     Row Name 11/09/21 1538       Plan    Plan Home   PO Vanc  PA approved.    Plan Comments Vancomycin prequired PA.  Submitted request on line at [employer]. Obtained approval. Per [facility] pharmacy price is 49.29.  Will confirm with patient that he can afford.    Row Name 11/09/21 1414       Plan    Plan Home.  PO Vanc PA pending.    Plan Comments Spoke with patient and spouse at bedside.  Confirmed pharmacy and PCP. Patient denies any DC needs. Called  pharmacy, received notice that Vancomycin po needs prior auth.  Secure message sent to Dali Franks asking her to obtain prior authorization for Vancomycin.                       Expected Discharge Date and Time     Expected Discharge Date Expected Discharge Time    Nov 10, 2021             Gissel Lopez RN   Phone communication or documentation only - no physical contact with patient or family.

## 2021-11-09 NOTE — PROGRESS NOTES
" LOS: 1 day   Patient Care Team:  Jose D Antunez MD as PCP - General (Family Medicine)  German Goodson MD as Consulting Physician (Gastroenterology)      Subjective \" Less diarrhea and feeling better today\"    Interval History:     Subjective: Frequency of diarrhea has improved although thin and mucousy.  Less rectal bleeding.  No abdominal pain denies nausea or vomiting.  Tolerating clear liquids.  No fevers.      ROS:   No chest pain, shortness of breath, or cough.        Medication Review:     Current Facility-Administered Medications:   •  acetaminophen (TYLENOL) tablet 650 mg, 650 mg, Oral, Q4H PRN **OR** acetaminophen (TYLENOL) 160 MG/5ML solution 650 mg, 650 mg, Oral, Q4H PRN **OR** acetaminophen (TYLENOL) suppository 650 mg, 650 mg, Rectal, Q4H PRN, Mahan, Ida, APRN  •  aluminum-magnesium hydroxide-simethicone (MAALOX MAX) 400-400-40 MG/5ML suspension 15 mL, 15 mL, Oral, Q6H PRN, Mahan, Ida, APRN  •  cholestyramine light packet 4 g, 1 packet, Oral, Q8H, Mack Meyers MD, 4 g at 11/09/21 0540  •  Magnesium Sulfate 2 gram infusion - Mg less than or equal to 1.5 mg/dL, 2 g, Intravenous, PRN **OR** Magnesium Sulfate 1 gram infusion - Mg 1.6-1.9 mg/dL, 1 g, Intravenous, PRN, Mahan, Ida, APRN  •  melatonin tablet 5 mg, 5 mg, Oral, Nightly PRN, Mahan, Ida, APRN  •  metroNIDAZOLE (FLAGYL) 500 mg/100mL IVPB, 500 mg, Intravenous, Q8H, Mack Meyers MD, Stopped at 11/09/21 0516  •  nitroglycerin (NITROSTAT) SL tablet 0.4 mg, 0.4 mg, Sublingual, Q5 Min PRN, Mahan, Ida, APRN  •  ondansetron (ZOFRAN) tablet 4 mg, 4 mg, Oral, Q6H PRN **OR** ondansetron (ZOFRAN) injection 4 mg, 4 mg, Intravenous, Q6H PRN, Mahan, Ida, APRN  •  Pharmacy Consult, , Does not apply, Continuous PRN, Mack Meyers MD  •  potassium chloride (K-DUR,KLOR-CON) CR tablet 40 mEq, 40 mEq, Oral, PRN **OR** potassium chloride (KLOR-CON) packet 40 mEq, 40 mEq, Oral, PRN **OR** potassium chloride 10 mEq " "in 100 mL IVPB, 10 mEq, Intravenous, Q1H PRN, Mahan, Ida, APRN  •  sodium chloride 0.9 % flush 10 mL, 10 mL, Intravenous, PRN, Alexandria William, APRN  •  sodium chloride 0.9 % flush 10 mL, 10 mL, Intravenous, Q12H, Mahan, Ida, APRN, 10 mL at 11/09/21 0820  •  sodium chloride 0.9 % flush 10 mL, 10 mL, Intravenous, PRN, Mahan, Ida, APRN  •  sodium chloride 0.9 % infusion, 100 mL/hr, Intravenous, Continuous, Mahan, Ida, APRN, Last Rate: 100 mL/hr at 11/08/21 2357, 100 mL/hr at 11/08/21 2357  •  traMADol (ULTRAM) tablet 50 mg, 50 mg, Oral, Q6H PRN, Mahan, Ida, APRN  •  vancomycin (VANCOCIN) capsule 500 mg, 500 mg, Oral, Q6H, Mack Meyers MD, 500 mg at 11/09/21 0540      Objective Resting in bed, no acute distress, no family present    Vital Signs  Vitals:    11/08/21 1900 11/08/21 2300 11/09/21 0300 11/09/21 0720   BP: 115/69 115/67 116/69 117/70   BP Location: Left arm Left arm Left arm Left arm   Patient Position: Lying Lying Lying Lying   Pulse: 88 93 91 96   Resp: 18 18 18    Temp: 98.3 °F (36.8 °C) 98.1 °F (36.7 °C) 98.5 °F (36.9 °C)    TempSrc: Oral Oral Oral    SpO2: 96% 95% 95% 96%   Weight:   70.4 kg (155 lb 2.6 oz)    Height:  172.7 cm (68\")         Physical Exam:     General Appearance:    Awake and alert, in no acute distress   Head:    Normocephalic, without obvious abnormality   Eyes:          Conjunctivae normal, anicteric sclera   Throat:   No oral lesions, no thrush, oral mucosa moist   Neck:    supple, no JVD   Lungs:     respirations regular, even and unlabored       Rectal:     Deferred   Extremities:   No edema, no cyanosis   Skin:   No bruising or rash, no jaundice        Results Review:    CBC    Results from last 7 days   Lab Units 11/09/21  0357 11/08/21  2358 11/08/21  1614 11/08/21  0926   WBC 10*3/mm3 12.30*  --   --  13.90*   HEMOGLOBIN g/dL 14.6 13.5 14.5 17.4   PLATELETS 10*3/mm3 357  --   --  467*     CMP   Results from last 7 days   Lab Units 11/09/21  0357 " 11/08/21  0924   SODIUM mmol/L 127* 125*   POTASSIUM mmol/L 4.1 4.1   CHLORIDE mmol/L 89* 85*   CO2 mmol/L 22.0 23.0   BUN mg/dL 11 17   CREATININE mg/dL 0.91 1.00   GLUCOSE mg/dL 65 121*   ALBUMIN g/dL 2.80* 3.10*   BILIRUBIN mg/dL 0.5 0.9   ALK PHOS U/L 69 91   AST (SGOT) U/L 24 25   ALT (SGPT) U/L 28 35   MAGNESIUM mg/dL 2.4  --    LIPASE U/L  --  11*     Cr Clearance Estimated Creatinine Clearance: 79.5 mL/min (by C-G formula based on SCr of 0.91 mg/dL).  Coag   Results from last 7 days   Lab Units 11/08/21  0925   INR  1.13*     HbA1C No results found for: HGBA1C  Blood Glucose No results found for: POCGLU  Infection     UA    Results from last 7 days   Lab Units 11/08/21  1104   NITRITE UA  Negative   WBC UA /HPF 0-2*   BACTERIA UA /HPF None Seen   SQUAM EPITHEL UA /HPF None Seen     Radiology(recent) CT Abdomen Pelvis With Contrast    Result Date: 11/8/2021  1.Wall thickening and hyperemia of colon, suggesting pancolitis. 2.Sigmoid diverticulosis. 3.Several prominent urinary bladder diverticula. 4.Bilateral L5 pars defects, with grade 1 anterolisthesis of L5 on S1.  Electronically Signed By-German Chin MD On:11/8/2021 10:39 AM This report was finalized on [id number] by  German Chin MD.         Assessment/Plan   C. difficile infection  Diarrhea with rectal bleeding -improving  Left-sided abdominal pain  History of pan ulcerative colitis -on azathioprine/Lialda  Hyponatremia     PLAN:  Stool PCR negative and C. difficile positive.  He has improved on oral vancomycin and IV Flagyl.  On cholestyramine.  Prednisone on hold with identified infectious process.  Okay to advance diet as tolerated.  Continue supportive care and will follow.  Hopefully home soon.  Hyponatremia improved.  Continue supportive care.    Electronically signed by KULDEEP Prince, 11/09/21, 8:29 AM EST.

## 2021-11-09 NOTE — CASE MANAGEMENT/SOCIAL WORK
Discharge Planning Assessment  [facility]     Patient Name: Gabriel Sandhu  MRN: [id number]  Today's Date: 11/9/2021    Admit Date: 11/8/2021     Discharge Needs Assessment     Row Name 11/09/21 1413       Living Environment    Lives With spouse    Current Living Arrangements home/apartment/condo    Primary Care Provided by self    Provides Primary Care For no one    Family Caregiver if Needed spouse    Quality of Family Relationships unable to assess    Able to Return to Prior Arrangements yes       Resource/Environmental Concerns    Resource/Environmental Concerns none    Transportation Concerns car, none       Transition Planning    Patient/Family Anticipates Transition to home    Patient/Family Anticipated Services at Transition none    Transportation Anticipated family or friend will provide       Discharge Needs Assessment    Equipment Currently Used at Home none    Concerns to be Addressed denies needs/concerns at this time    Anticipated Changes Related to Illness none    Equipment Needed After Discharge none    Provided Post Acute Provider List? N/A    N/A Provider List Comment denies dc needs    Provided Post Acute Provider Quality & Resource List? N/A               Discharge Plan     Row Name 11/09/21 1414       Plan    Plan Home.  PO Vanc PA pending.    Plan Comments Spoke with patient and spouse at bedside.  Confirmed pharmacy and PCP. Patient denies any DC needs. Called  pharmacy, received notice that Vancomycin po needs prior auth.  Secure message sent to Dali Franks asking her to obtain prior authorization for Vancomycin.              Continued Care and Services - Admitted Since 11/8/2021    Coordination has not been started for this encounter.       Expected Discharge Date and Time     Expected Discharge Date Expected Discharge Time    Nov 14, 2021          Demographic Summary     Row Name 11/09/21 1412       General Information    Admission Type inpatient    Arrived From emergency department     Referral Source admission list    Reason for Consult discharge planning    Preferred Language English               Functional Status     Row Name 11/09/21 1413       Functional Status    Usual Activity Tolerance good    Current Activity Tolerance good       Functional Status, IADL    Medications independent    Meal Preparation independent    Housekeeping independent    Laundry independent    Shopping independent       Mental Status    General Appearance WDL WDL       Mental Status Summary    Recent Changes in Mental Status/Cognitive Functioning no changes                         Gissel Lopez, RN   Met with patient in room wearing PPE: mask, face shield/goggles, gloves, gown.      Maintained distance greater than six feet and spent less than 15 minutes in the room.

## 2021-11-09 NOTE — PLAN OF CARE
Problem: Adult Inpatient Plan of Care  Goal: Absence of Hospital-Acquired Illness or Injury  Intervention: Identify and Manage Fall Risk  Recent Flowsheet Documentation  Taken 11/9/2021 0301 by Kermit Bassett LPN  Safety Promotion/Fall Prevention:   safety round/check completed   room organization consistent   nonskid shoes/slippers when out of bed   muscle strengthening facilitated   mobility aid in reach   lighting adjusted   activity supervised   assistive device/personal items within reach   clutter free environment maintained  Taken 11/9/2021 0100 by Kermit Bassett LPN  Safety Promotion/Fall Prevention:   safety round/check completed   room organization consistent   nonskid shoes/slippers when out of bed   muscle strengthening facilitated   mobility aid in reach   lighting adjusted   fall prevention program maintained   clutter free environment maintained   assistive device/personal items within reach   activity supervised  Taken 11/8/2021 2300 by Kermit Bassett LPN  Safety Promotion/Fall Prevention:   safety round/check completed   patient off unit  Taken 11/8/2021 2100 by Kermit Bassett LPN  Safety Promotion/Fall Prevention:   safety round/check completed   room organization consistent   nonskid shoes/slippers when out of bed   muscle strengthening facilitated   mobility aid in reach   lighting adjusted   fall prevention program maintained   clutter free environment maintained   assistive device/personal items within reach   activity supervised  Taken 11/8/2021 1951 by Kermit Bassett LPN  Safety Promotion/Fall Prevention:   safety round/check completed   room organization consistent   nonskid shoes/slippers when out of bed   muscle strengthening facilitated   mobility aid in reach   lighting adjusted   fall prevention program maintained   clutter free environment maintained   assistive device/personal items within reach   activity supervised  Intervention: Prevent Skin  Injury  Recent Flowsheet Documentation  Taken 11/9/2021 0100 by Kermit aBssett LPN  Body Position: position changed independently  Taken 11/8/2021 2300 by Kermit Bassett LPN  Body Position: position changed independently  Taken 11/8/2021 2100 by Kermit Bassett LPN  Body Position:   position changed independently   position maintained  Taken 11/8/2021 1951 by Kermit Bassett LPN  Body Position: position changed independently  Intervention: Prevent Infection  Recent Flowsheet Documentation  Taken 11/9/2021 0301 by Kermit Bassett LPN  Infection Prevention:   visitors restricted/screened   single patient room provided   rest/sleep promoted   personal protective equipment utilized   hand hygiene promoted   equipment surfaces disinfected  Taken 11/9/2021 0100 by Kermit Bassett LPN  Infection Prevention:   visitors restricted/screened   single patient room provided   rest/sleep promoted   personal protective equipment utilized   hand hygiene promoted  Taken 11/8/2021 2300 by Kermit Bassett LPN  Infection Prevention:   visitors restricted/screened   single patient room provided   rest/sleep promoted   personal protective equipment utilized   hand hygiene promoted  Taken 11/8/2021 2100 by Kermit Bassett LPN  Infection Prevention:   visitors restricted/screened   single patient room provided   rest/sleep promoted   personal protective equipment utilized   hand hygiene promoted   equipment surfaces disinfected  Taken 11/8/2021 1951 by Kermit Bassett LPN  Infection Prevention: visitors restricted/screened  Goal: Optimal Comfort and Wellbeing  Intervention: Provide Person-Centered Care  Recent Flowsheet Documentation  Taken 11/8/2021 1951 by Kermit Bassett LPN  Trust Relationship/Rapport: thoughts/feelings acknowledged     Problem: Adjustment to Illness (Gastrointestinal Bleeding)  Goal: Optimal Coping with Acute Illness  Intervention: Optimize Psychosocial Response to  Unexpected Illness  Recent Flowsheet Documentation  Taken 11/8/2021 1951 by Kermit Bassett LPN  Supportive Measures: active listening utilized     Problem: Adjustment to Illness (Gastrointestinal Bleeding)  Goal: Optimal Coping with Acute Illness  Intervention: Optimize Psychosocial Response to Unexpected Illness  Recent Flowsheet Documentation  Taken 11/8/2021 1951 by Kermit Bassett LPN  Supportive Measures: active listening utilized     Problem: Adult Inpatient Plan of Care  Goal: Absence of Hospital-Acquired Illness or Injury  Intervention: Identify and Manage Fall Risk  Recent Flowsheet Documentation  Taken 11/9/2021 0301 by Kermit Bassett LPN  Safety Promotion/Fall Prevention:   safety round/check completed   room organization consistent   nonskid shoes/slippers when out of bed   muscle strengthening facilitated   mobility aid in reach   lighting adjusted   activity supervised   assistive device/personal items within reach   clutter free environment maintained  Taken 11/9/2021 0100 by Kermit Bassett LPN  Safety Promotion/Fall Prevention:   safety round/check completed   room organization consistent   nonskid shoes/slippers when out of bed   muscle strengthening facilitated   mobility aid in reach   lighting adjusted   fall prevention program maintained   clutter free environment maintained   assistive device/personal items within reach   activity supervised  Taken 11/8/2021 2300 by Kermit Bassett LPN  Safety Promotion/Fall Prevention:   safety round/check completed   patient off unit  Taken 11/8/2021 2100 by Kermit Bassett LPN  Safety Promotion/Fall Prevention:   safety round/check completed   room organization consistent   nonskid shoes/slippers when out of bed   muscle strengthening facilitated   mobility aid in reach   lighting adjusted   fall prevention program maintained   clutter free environment maintained   assistive device/personal items within reach   activity  supervised  Taken 11/8/2021 1951 by Kermit Bassett LPN  Safety Promotion/Fall Prevention:   safety round/check completed   room organization consistent   nonskid shoes/slippers when out of bed   muscle strengthening facilitated   mobility aid in reach   lighting adjusted   fall prevention program maintained   clutter free environment maintained   assistive device/personal items within reach   activity supervised  Intervention: Prevent Skin Injury  Recent Flowsheet Documentation  Taken 11/9/2021 0100 by Kermit Bassett LPN  Body Position: position changed independently  Taken 11/8/2021 2300 by Kermit Bassett LPN  Body Position: position changed independently  Taken 11/8/2021 2100 by Kermit Bassett LPN  Body Position:   position changed independently   position maintained  Taken 11/8/2021 1951 by Kermit Bassett LPN  Body Position: position changed independently  Intervention: Prevent Infection  Recent Flowsheet Documentation  Taken 11/9/2021 0301 by Kermit Bassett LPN  Infection Prevention:   visitors restricted/screened   single patient room provided   rest/sleep promoted   personal protective equipment utilized   hand hygiene promoted   equipment surfaces disinfected  Taken 11/9/2021 0100 by Kermit Bassett LPN  Infection Prevention:   visitors restricted/screened   single patient room provided   rest/sleep promoted   personal protective equipment utilized   hand hygiene promoted  Taken 11/8/2021 2300 by Kermit Bassett LPN  Infection Prevention:   visitors restricted/screened   single patient room provided   rest/sleep promoted   personal protective equipment utilized   hand hygiene promoted  Taken 11/8/2021 2100 by Kermit Bassett LPN  Infection Prevention:   visitors restricted/screened   single patient room provided   rest/sleep promoted   personal protective equipment utilized   hand hygiene promoted   equipment surfaces disinfected  Taken 11/8/2021 1951 by  Kermit Bassett LPN  Infection Prevention: visitors restricted/screened  Goal: Optimal Comfort and Wellbeing  Intervention: Provide Person-Centered Care  Recent Flowsheet Documentation  Taken 11/8/2021 1951 by Kermit Bassett LPN  Trust Relationship/Rapport: thoughts/feelings acknowledged     Problem: Adjustment to Illness (Gastrointestinal Bleeding)  Goal: Optimal Coping with Acute Illness  Intervention: Optimize Psychosocial Response to Unexpected Illness  Recent Flowsheet Documentation  Taken 11/8/2021 1951 by Kermit Bassett LPN  Supportive Measures: active listening utilized     Problem: Adjustment to Illness (Gastrointestinal Bleeding)  Goal: Optimal Coping with Acute Illness  Intervention: Optimize Psychosocial Response to Unexpected Illness  Recent Flowsheet Documentation  Taken 11/8/2021 1951 by Kermit Bassett LPN  Supportive Measures: active listening utilized   Goal Outcome Evaluation:

## 2021-11-10 PROBLEM — R10.32 ACUTE ABDOMINAL PAIN IN LEFT LOWER QUADRANT: Status: RESOLVED | Noted: 2021-01-01 | Resolved: 2021-01-01

## 2021-11-10 PROBLEM — K92.2 GASTROINTESTINAL HEMORRHAGE: Status: RESOLVED | Noted: 2021-01-01 | Resolved: 2021-01-01

## 2021-11-10 NOTE — OUTREACH NOTE
Prep Survey      Responses   [facility] patient discharged from? [facility]   Is LACE score < 7 ? Yes   Emergency Room discharge w/ pulse ox? No   Eligibility [facility]   Date of Admission 11/08/21   Date of Discharge 11/10/21   Discharge Disposition Home or Self Care   Discharge diagnosis C. difficile colitis   Does the patient have one of the following disease processes/diagnoses(primary or secondary)? Other   Does the patient have Home health ordered? No   Is there a DME ordered? No   Prep survey completed? Yes          Lilliana Hernandez RN

## 2021-11-10 NOTE — DISCHARGE SUMMARY
[facility] Medicine Services  DISCHARGE SUMMARY    Patient Name: Gabriel Sandhu  : 1955  MRN: [id number]    Date of Admission: 2021  Date of Discharge: 11/10/2021  Primary Care Physician: Jose D Antunez MD      Presenting Problem:   Hyponatremia [E87.1]  Bandemia [D72.825]  C. difficile colitis [A04.72]    Active and Resolved Hospital Problems:  Active Hospital Problems    Diagnosis POA   • **C. difficile colitis [A04.72] Yes     Priority: High   • Hyponatremia [E87.1] Yes   • Ulcerative colitis (HCC) [K51.90] Yes      Resolved Hospital Problems    Diagnosis POA   • Gastrointestinal hemorrhage [K92.2] Yes   • Acute abdominal pain in left lower quadrant [R10.32] Yes     Acute C. difficile colitis with history of prior C. difficile infections  Chronic ulcerative colitis   Gastrointestinal hemorrhage with rectal bleeding likely secondary to C. difficile colitis, resolved  Acute abdominal pain in left lower quadrant due to C. difficile colitis, resolved  -contact/spore isolation  -Continue oral vancomycin, Flagyl, Welchol and Florastor  -Continue Lialda  -Hold azathioprine while treating active infection     Hyponatremia, mild and not clinically significant  -likely dehydration secondary to diarrhea  -Na 125 on admission and has improved to 133         Hospital Course     Hospital Course:  Gabriel Sandhu is a 66 y.o. male with ulcerative colitis who was admitted for abdominal pain, diarrhea and bright red bleeding per rectum and was found to have C. difficile colitis.  He gradually improved with IV Flagyl, oral vancomycin and cholestyramine and will be switched to p.o. patient is felt to be stable for discharge.      Day of Discharge     Vital Signs:  Temp:  [98.4 °F (36.9 °C)-98.7 °F (37.1 °C)] 98.7 °F (37.1 °C)  Heart Rate:  [80-91] 80  Resp:  [16] 16  BP: (113-115)/(50-70) 113/70    Physical Exam:  Physical Exam vital signs and nurses notes reviewed.  Well-developed  well-nourished gentleman walking around the room in no acute distress awake and alert; mucous membranes moist; sclerae anicteric; lungs clear to auscultation bilaterally; CV regular rate and rhythm; abdomen soft nontender nondistended; extremities with no edema, cyanosis or calf tenderness; no Silva catheter.      Pertinent  and/or Most Recent Results     LAB RESULTS:      Lab 11/10/21  0411 11/09/21  0357 11/08/21  2358 11/08/21  1614 11/08/21  1156 11/08/21  0926 11/08/21  0925 11/08/21 0924   WBC 7.30 12.30*  --   --   --  13.90*  --   --    HEMOGLOBIN 13.1 14.6 13.5 14.5  --  17.4  --   --    HEMATOCRIT 37.8 41.7 38.4 40.8  --  48.8  --   --    PLATELETS 282 357  --   --   --  467*  --   --    NEUTROS ABS 6.20 10.90*  --   --   --  12.23*  --   --    LYMPHS ABS 0.40* 0.50*  --   --   --   --   --   --    MONOS ABS 0.60 0.80  --   --   --   --   --   --    EOS ABS 0.10 0.10  --   --   --   --   --   --    MCV 96.8 96.5  --   --   --  94.4  --   --    SED RATE  --   --   --   --   --  56*  --   --    CRP  --   --   --   --   --   --   --  20.78*   LACTATE  --   --   --   --  0.9  --   --   --    PROTIME  --   --   --   --   --   --  12.4*  --          Lab 11/10/21  0411 11/09/21  0357 11/08/21  0924   SODIUM 133* 127* 125*   POTASSIUM 3.5 4.1 4.1   CHLORIDE 99 89* 85*   CO2 20.0* 22.0 23.0   ANION GAP 14.0 16.0* 17.0*   BUN 8 11 17   CREATININE 0.77 0.91 1.00   GLUCOSE 88 65 121*   CALCIUM 7.1* 8.0* 8.6   MAGNESIUM 2.1 2.4  --          Lab 11/10/21  0411 11/09/21  0357 11/08/21  0924   TOTAL PROTEIN 4.6* 5.6* 6.9   ALBUMIN 2.00* 2.80* 3.10*   GLOBULIN 2.6 2.8 3.8   ALT (SGPT) 15 28 35   AST (SGOT) 14 24 25   BILIRUBIN 0.3 0.5 0.9   ALK PHOS 87 69 91   LIPASE  --   --  11*         Lab 11/08/21  0925   PROTIME 12.4*   INR 1.13*             Lab 11/08/21 0924   ABO TYPING O   RH TYPING Negative   ANTIBODY SCREEN Negative         Brief Urine Lab Results  (Last result in the past 365 days)      Color   Clarity   Blood    Leuk Est   Nitrite   Protein   CREAT   Urine HCG        11/08/21 1104 Yellow   Clear   Trace   Negative   Negative   Negative               Microbiology Results (last 10 days)     Procedure Component Value - Date/Time    Gastrointestinal Panel, PCR - Stool, Per Rectum [[id number]]  (Normal) Collected: 11/08/21 1344    Lab Status: Final result Specimen: Stool from Per Rectum Updated: 11/08/21 1547     Campylobacter Not Detected     Plesiomonas shigelloides Not Detected     Salmonella Not Detected     Vibrio Not Detected     Vibrio cholerae Not Detected     Yersinia enterocolitica Not Detected     Enteroaggregative E. coli (EAEC) Not Detected     Enteropathogenic E. coli (EPEC) Not Detected     Enterotoxigenic E. coli (ETEC) lt/st Not Detected     Shiga-like toxin-producing E. coli (STEC) stx1/stx2 Not Detected     Shigella/Enteroinvasive E. coli (EIEC) Not Detected     Cryptosporidium Not Detected     Cyclospora cayetanensis Not Detected     Entamoeba histolytica Not Detected     Giardia lamblia Not Detected     Adenovirus F40/41 Not Detected     Astrovirus Not Detected     Norovirus GI/GII Not Detected     Rotavirus A Not Detected     Sapovirus (I, II, IV or V) Not Detected    Narrative:      If Aeromonas, Staphylococcus aureus or Bacillus cereus are suspected, please order [id number]: Stool Culture, Aeromonas, S aureus, B Cereus.    Clostridium Difficile Toxin - Stool, Per Rectum [[id number]]  (Normal) Collected: 11/08/21 1344    Lab Status: Final result Specimen: Stool from Per Rectum Updated: 11/09/21 0054    Narrative:      The following orders were created for panel order Clostridium Difficile Toxin - Stool, Per Rectum.  Procedure                               Abnormality         Status                     ---------                               -----------         ------                     Clostridium Difficile EI...[[id number]]  Normal              Final result                 Please view results for these tests  on the individual orders.    Clostridium Difficile EIA - Stool, Per Rectum [[id number]]  (Normal) Collected: 11/08/21 1344    Lab Status: Final result Specimen: Stool from Per Rectum Updated: 11/09/21 0825     C Diff GDH / Toxin Indeterminate     Comment: Indeterminate result.  Please refer to PCR result.       Clostridium Difficile Toxin, PCR - Stool, Per Rectum [[id number]]  (Abnormal) Collected: 11/08/21 1344    Lab Status: Final result Specimen: Stool from Per Rectum Updated: 11/09/21 0827     C. Difficile Toxins by PCR Detected    Blood Culture - Blood, Arm, Left [[id number]]  (Normal) Collected: 11/08/21 1151    Lab Status: Preliminary result Specimen: Blood from Arm, Left Updated: 11/10/21 1200     Blood Culture No growth at 2 days    Blood Culture - Blood, Arm, Right [[id number]]  (Normal) Collected: 11/08/21 1151    Lab Status: Preliminary result Specimen: Blood from Arm, Right Updated: 11/10/21 1200     Blood Culture No growth at 2 days    COVID PRE-OP / PRE-PROCEDURE SCREENING ORDER (NO ISOLATION) - Swab, Nasopharynx [[id number]]  (Normal) Collected: 11/08/21 1114    Lab Status: Final result Specimen: Swab from Nasopharynx Updated: 11/08/21 1148    Narrative:      The following orders were created for panel order COVID PRE-OP / PRE-PROCEDURE SCREENING ORDER (NO ISOLATION) - Swab, Nasopharynx.  Procedure                               Abnormality         Status                     ---------                               -----------         ------                     COVID-19,CEPHEID/TAMMIE/BD...[[id number]]  Normal              Final result                 Please view results for these tests on the individual orders.    COVID-19,CEPHEID/TAMMIE/BDMAX,COR/AYAH/PAD/[facility]VONNE IN-HOUSE(OR EMERGENT/ADD-ON),NP SWAB IN TRANSPORT MEDIA 3-4 HR TAT, RT-PCR - Swab, Nasopharynx [[id number]]  (Normal) Collected: 11/08/21 1114    Lab Status: Final result Specimen: Swab from Nasopharynx Updated: 11/08/21 1148     COVID19 Not Detected     Narrative:      Fact sheet for providers: https://www.fda.gov/media/649253/download     Fact sheet for patients: https://www.fda.gov/media/345138/download  Fact sheet for providers: https://www.fda.gov/media/238804/download    Fact sheet for patients: https://www.fda.gov/media/205981/download    Test performed by PCR.          CT Abdomen Pelvis With Contrast    Result Date: 11/8/2021  Impression: 1.Wall thickening and hyperemia of colon, suggesting pancolitis. 2.Sigmoid diverticulosis. 3.Several prominent urinary bladder diverticula. 4.Bilateral L5 pars defects, with grade 1 anterolisthesis of L5 on S1.  Electronically Signed By-German Chin MD On:11/8/2021 10:39 AM This report was finalized on [id number] by  German Chin MD.                  Labs Pending at Discharge:  Pending Labs     Order Current Status    Blood Culture - Blood, Arm, Left Preliminary result    Blood Culture - Blood, Arm, Right Preliminary result          Procedures Performed           Consults:   Consults     Date and Time Order Name Status Description    11/8/2021 11:09 AM Gastroenterology (on-call MD unless specified) Completed             Discharge Details        Discharge Medications      New Medications      Instructions Start Date   cholestyramine light 4 g packet   4 g, Oral, Every 8 Hours PRN      metroNIDAZOLE 500 MG tablet  Commonly known as: FLAGYL   500 mg, Oral, Every 8 Hours Scheduled      saccharomyces boulardii 250 MG capsule  Commonly known as: Florastor   250 mg, Oral, 2 Times Daily      vancomycin 250 MG capsule  Commonly known as: VANCOCIN   500 mg, Oral, Every 6 Hours Scheduled         Continue These Medications      Instructions Start Date   CALCIUM 600-D PO   1 capsule, Oral, Daily      Centrum Adults tablet tablet  Generic drug: multivitamin with minerals   1 tablet, Oral, Daily      folic acid 800 MCG tablet  Commonly known as: FOLVITE   800 mcg, Oral, Daily      GLUCOSAMINE PO   1 tablet, Oral, Daily       Lialda 1.2 g EC tablet  Generic drug: mesalamine   4,800 mg, Oral, Daily      vardenafil 20 MG tablet  Commonly known as: LEVITRA   TAKE 1 TABLET BY MOUTH ONCE DAILY TAKE  1  HOUR  PRIOR  TO  SEXUAL  ACTIVITY  (NO  MORE  THAN  ONCE  A  DAY)         Stop These Medications    azaTHIOprine 50 MG tablet  Commonly known as: IMURAN     Red Yeast Rice Extract 600 MG capsule            No Known Allergies      Discharge Disposition:   Home or Self Care    Diet:  Hospital:  Diet Order   Procedures   • Diet Regular         Discharge Activity:   Activity Instructions     Activity as Tolerated              CODE STATUS:  Code Status and Medical Interventions:   Ordered at: 11/08/21 1221     Code Status (Patient has no pulse and is not breathing):    CPR (Attempt to Resuscitate)     Medical Interventions (Patient has pulse or is breathing):    Full Support         Future Appointments   Date Time Provider Department Center   12/27/2021  9:30 AM Jose D Antunez MD [facility]       Additional Instructions for the Follow-ups that You Need to Schedule     Call MD With Problems / Concerns   As directed      Instructions: Call [phone] or email [email or web address] for problems or concerns.    Order Comments: Instructions: Call [phone] or email [email or web address] for problems or concerns.          Discharge Follow-up with PCP   As directed       Currently Documented PCP:    Jose D Antunez MD    PCP Phone Number:    [phone]     Follow Up Details: 2-3 business days via telehealth if possible               Time spent on Discharge including face to face service: 25 minutes    This patient has been examined wearing appropriate Personal Protective Equipment and discussed with hospital infection control department. 11/10/21      Signature: Electronically signed by Juana Forrester MD, 11/10/21, 1:05 PM EST.

## 2021-11-10 NOTE — PROGRESS NOTES
" LOS: 2 days   Patient Care Team:  Jose D Antunez MD as PCP - General (Family Medicine)  German Goodson MD as Consulting Physician (Gastroenterology)      Subjective \" I am getting there\"    Interval History:     Subjective: Significantly less diarrhea with less volume.  12 bowel movements in the last 12 hours.  Blood-tinged stool but minimal abdominal pain.  No nausea or vomiting tolerating diet.  No fevers.      ROS:   No chest pain, shortness of breath, or cough.        Medication Review:     Current Facility-Administered Medications:   •  acetaminophen (TYLENOL) tablet 650 mg, 650 mg, Oral, Q4H PRN **OR** acetaminophen (TYLENOL) 160 MG/5ML solution 650 mg, 650 mg, Oral, Q4H PRN **OR** acetaminophen (TYLENOL) suppository 650 mg, 650 mg, Rectal, Q4H PRN, Ida Mahan, APRN  •  aluminum-magnesium hydroxide-simethicone (MAALOX MAX) 400-400-40 MG/5ML suspension 15 mL, 15 mL, Oral, Q6H PRN, Ida Mahan, APRN  •  cholestyramine light packet 4 g, 1 packet, Oral, Q8H, Mack Meyers MD, 4 g at 11/10/21 0713  •  folic acid (FOLVITE) tablet 800 mcg, 800 mcg, Oral, Daily, Juana Forrester MD, 800 mcg at 11/10/21 0713  •  Magnesium Sulfate 2 gram infusion - Mg less than or equal to 1.5 mg/dL, 2 g, Intravenous, PRN **OR** Magnesium Sulfate 1 gram infusion - Mg 1.6-1.9 mg/dL, 1 g, Intravenous, PRN, Ida Mahan, APRN  •  melatonin tablet 5 mg, 5 mg, Oral, Nightly PRN, Ida Mahan APRN  •  mesalamine (LIALDA) EC tablet 4.8 g, 4,800 mg, Oral, Daily, Juana Forrester MD, 4.8 g at 11/10/21 0712  •  metroNIDAZOLE (FLAGYL) tablet 500 mg, 500 mg, Oral, Q8H, Alexandria William APRN, 500 mg at 11/10/21 0606  •  multivitamin with minerals 1 tablet, 1 tablet, Oral, Daily, Juana Forrester MD, 1 tablet at 11/10/21 0712  •  nitroglycerin (NITROSTAT) SL tablet 0.4 mg, 0.4 mg, Sublingual, Q5 Min PRN, Ida Mahan, KULDEEP  •  ondansetron (ZOFRAN) tablet 4 mg, 4 mg, Oral, Q6H PRN **OR** ondansetron (ZOFRAN) injection 4 mg, 4 mg, " Intravenous, Q6H PRN, Mahan, Ida, APRN  •  Pharmacy Consult, , Does not apply, Continuous PRN, Mack Meyers MD  •  potassium chloride (K-DUR,KLOR-CON) CR tablet 40 mEq, 40 mEq, Oral, PRN **OR** potassium chloride (KLOR-CON) packet 40 mEq, 40 mEq, Oral, PRN **OR** potassium chloride 10 mEq in 100 mL IVPB, 10 mEq, Intravenous, Q1H PRN, Mahan, Ida, APRN  •  sodium chloride 0.9 % flush 10 mL, 10 mL, Intravenous, PRN, LongAlexandria M, APRN  •  sodium chloride 0.9 % flush 10 mL, 10 mL, Intravenous, Q12H, Mahan, Dia, APRN, 10 mL at 11/10/21 0713  •  sodium chloride 0.9 % flush 10 mL, 10 mL, Intravenous, PRN, Mahan, Ida, APRN  •  traMADol (ULTRAM) tablet 50 mg, 50 mg, Oral, Q6H PRN, Mahan, Ida, APRN  •  vancomycin (VANCOCIN) capsule 500 mg, 500 mg, Oral, Q6H, Mack Meyers MD, 500 mg at 11/10/21 0606      Objective Resting in bed, no acute distress, no family present    Vital Signs  Vitals:    11/09/21 0720 11/09/21 1200 11/09/21 1924 11/10/21 0316   BP: 117/70 114/70 115/50 113/70   BP Location: Left arm Left arm Left arm Left arm   Patient Position: Lying Lying Lying Lying   Pulse: 96 88 91 80   Resp:  16 16 16   Temp:  97.7 °F (36.5 °C) 98.4 °F (36.9 °C) 98.7 °F (37.1 °C)   TempSrc:  Oral Oral Oral   SpO2: 96% 98% 97% 94%   Weight:       Height:           Physical Exam:     General Appearance:    Awake and alert, in no acute distress   Head:    Normocephalic, without obvious abnormality   Eyes:          Conjunctivae normal, anicteric sclera   Throat:   No oral lesions, no thrush, oral mucosa moist   Neck:  supple, no JVD           Rectal:     Deferred   Extremities:   No edema, no cyanosis   Skin:   No bruising or rash, no jaundice        Results Review:    CBC    Results from last 7 days   Lab Units 11/10/21  0411 11/09/21  0357 11/08/21  2358 11/08/21  1614 11/08/21  0926   WBC 10*3/mm3 7.30 12.30*  --   --  13.90*   HEMOGLOBIN g/dL 13.1 14.6 13.5 14.5 17.4   PLATELETS 10*3/mm3 282 357   --   --  467*     CMP   Results from last 7 days   Lab Units 11/10/21  0411 11/09/21  0357 11/08/21  0924   SODIUM mmol/L 133* 127* 125*   POTASSIUM mmol/L 3.5 4.1 4.1   CHLORIDE mmol/L 99 89* 85*   CO2 mmol/L 20.0* 22.0 23.0   BUN mg/dL 8 11 17   CREATININE mg/dL 0.77 0.91 1.00   GLUCOSE mg/dL 88 65 121*   ALBUMIN g/dL 2.00* 2.80* 3.10*   BILIRUBIN mg/dL 0.3 0.5 0.9   ALK PHOS U/L 87 69 91   AST (SGOT) U/L 14 24 25   ALT (SGPT) U/L 15 28 35   MAGNESIUM mg/dL 2.1 2.4  --    LIPASE U/L  --   --  11*     Cr Clearance Estimated Creatinine Clearance: 90.4 mL/min (by C-G formula based on SCr of 0.77 mg/dL).  Coag   Results from last 7 days   Lab Units 11/08/21  0925   INR  1.13*     HbA1C No results found for: HGBA1C  Blood Glucose No results found for: POCGLU  Infection   Results from last 7 days   Lab Units 11/08/21  1151   BLOODCX  No growth at 24 hours  No growth at 24 hours     UA    Results from last 7 days   Lab Units 11/08/21  1104   NITRITE UA  Negative   WBC UA /HPF 0-2*   BACTERIA UA /HPF None Seen   SQUAM EPITHEL UA /HPF None Seen     Radiology(recent) CT Abdomen Pelvis With Contrast    Result Date: 11/8/2021  1.Wall thickening and hyperemia of colon, suggesting pancolitis. 2.Sigmoid diverticulosis. 3.Several prominent urinary bladder diverticula. 4.Bilateral L5 pars defects, with grade 1 anterolisthesis of L5 on S1.  Electronically Signed By-German Chin MD On:11/8/2021 10:39 AM This report was finalized on [id number] by  German Chin MD.         Assessment/Plan   C. difficile infection  Diarrhea with rectal bleeding -improving  Left-sided abdominal pain  History of pan ulcerative colitis -on azathioprine/Lialda  Hyponatremia     PLAN:  He continues to improve with antibiotic coverage.  Would recommend continuing vancomycin X total of 14 days and Florastor twice daily upon discharge.  Would recommend discharging home on WelChol to help control diarrhea as well.  No prednisone upon discharge.   Can continue mesalamine.  He can follow-up with me in 2 to 3 weeks and will call the office with any continued or worsening symptoms.  We will see inpatient as needed, call with questions or concerns.  Okay to discharge home if otherwise medically stable.    Electronically signed by KULDEEP Prince, 11/10/21, 9:14 AM EST.

## 2021-11-10 NOTE — PLAN OF CARE
Problem: Adult Inpatient Plan of Care  Goal: Absence of Hospital-Acquired Illness or Injury  Intervention: Identify and Manage Fall Risk  Recent Flowsheet Documentation  Taken 11/10/2021 0301 by Kermit Bassett LPN  Safety Promotion/Fall Prevention:  • safety round/check completed  • room organization consistent  • nonskid shoes/slippers when out of bed  • muscle strengthening facilitated  • mobility aid in reach  • lighting adjusted  • fall prevention program maintained  • clutter free environment maintained  • activity supervised  • assistive device/personal items within reach  Taken 11/10/2021 0100 by Kermit Bassett LPN  Safety Promotion/Fall Prevention:  • safety round/check completed  • room organization consistent  • nonskid shoes/slippers when out of bed  • muscle strengthening facilitated  • mobility aid in reach  • lighting adjusted  • clutter free environment maintained  • assistive device/personal items within reach  • activity supervised  Taken 11/9/2021 2330 by Kermit Bassett LPN  Safety Promotion/Fall Prevention:  • safety round/check completed  • room organization consistent  Taken 11/9/2021 2100 by Kermit Bassett LPN  Safety Promotion/Fall Prevention:  • safety round/check completed  • room organization consistent  • nonskid shoes/slippers when out of bed  • muscle strengthening facilitated  • mobility aid in reach  • lighting adjusted  • activity supervised  • assistive device/personal items within reach  • clutter free environment maintained  Taken 11/9/2021 1943 by Kermit Bassett LPN  Safety Promotion/Fall Prevention:  • safety round/check completed  • nonskid shoes/slippers when out of bed  • muscle strengthening facilitated  • mobility aid in reach  • lighting adjusted  • activity supervised  • assistive device/personal items within reach  • clutter free environment maintained  Intervention: Prevent Skin Injury  Recent Flowsheet Documentation  Taken 11/10/2021 0301  by Serjio, Kermit L, LPN  Body Position: position changed independently  Taken 11/10/2021 0100 by Kermit Bassett LPN  Body Position:  • position changed independently  • position maintained  Taken 11/9/2021 2330 by Kermit Bassett LPN  Body Position: position changed independently  Taken 11/9/2021 2100 by Kermit Bassett LPN  Body Position: position changed independently  Taken 11/9/2021 1943 by Kermit Bassett LPN  Body Position: position changed independently  Intervention: Prevent Infection  Recent Flowsheet Documentation  Taken 11/10/2021 0301 by Kermit Bassett LPN  Infection Prevention:  • visitors restricted/screened  • single patient room provided  • personal protective equipment utilized  • rest/sleep promoted  • hand hygiene promoted  Taken 11/10/2021 0100 by Kermit Bassett LPN  Infection Prevention:  • visitors restricted/screened  • single patient room provided  • rest/sleep promoted  • personal protective equipment utilized  • hand hygiene promoted  Taken 11/9/2021 2330 by Kermit Bassett LPN  Infection Prevention:  • visitors restricted/screened  • single patient room provided  • rest/sleep promoted  • personal protective equipment utilized  • hand hygiene promoted  Taken 11/9/2021 2100 by Kermit Bassett LPN  Infection Prevention:  • visitors restricted/screened  • single patient room provided  • rest/sleep promoted  • personal protective equipment utilized  Taken 11/9/2021 1943 by Kermit Bassett LPN  Infection Prevention: environmental surveillance performed  Goal: Optimal Comfort and Wellbeing  Intervention: Provide Person-Centered Care  Recent Flowsheet Documentation  Taken 11/9/2021 1943 by Kermit Bassett LPN  Trust Relationship/Rapport: care explained     Problem: Adjustment to Illness (Gastrointestinal Bleeding)  Goal: Optimal Coping with Acute Illness  Intervention: Optimize Psychosocial Response to Unexpected Illness  Recent Flowsheet  Documentation  Taken 11/9/2021 1943 by Kermit Bassett LPN  Supportive Measures: active listening utilized   Goal Outcome Evaluation:

## 2021-11-10 NOTE — CASE MANAGEMENT/SOCIAL WORK
Continued Stay Note  DEN Mims     Patient Name: Gabriel Sandhu  MRN: [id number]  Today's Date: 11/10/2021    Admit Date: 11/8/2021     Discharge Plan     Row Name 11/10/21 1332       Plan    Plan Home PO Vanc PA approved.    Plan Comments Spoke with patient at bedside.  Notified that PA approved for Vancomycin po. Notified patient that  [facility] pharmacy price 49.29 and he states he could afford this. Patient okayed to go home per GI.  Awaiting decision from other providers                            Expected Discharge Date and Time     Expected Discharge Date Expected Discharge Time    Nov 10, 2021             Gissel Lopez RN   Met with patient in room wearing PPE: mask, goggles  Maintained distance greater than six feet and spent less than 15 minutes in the room.

## 2021-11-10 NOTE — PLAN OF CARE
Goal Outcome Evaluation:  Plan of Care Reviewed With: patient        Progress: no change  Outcome Summary: pt. reports less stool & freq. this AM. Reports feeling a little better. OK to d/c per GI. may d/c home later today with PO meds

## 2021-11-10 NOTE — CASE MANAGEMENT/SOCIAL WORK
Continued Stay Note  DEN Mims     Patient Name: Gabriel Sandhu  MRN: [id number]  Today's Date: 11/10/2021    Admit Date: 11/8/2021     Discharge Plan     Row Name 11/10/21 1411       Plan    Plan Comments Discharge orders noted    Final Discharge Disposition Code 01 - home or self-care    Final Note Home               Jami Lopez RN

## 2021-11-11 NOTE — OUTREACH NOTE
Call Center TCM Note      Responses   [facility] patient discharged from? [facility]   Does the patient have one of the following disease processes/diagnoses(primary or secondary)? Other   TCM attempt successful? No   Unsuccessful attempts Attempt 1          Letty Sandhu RN    11/11/2021, 10:08 EST

## 2021-11-11 NOTE — OUTREACH NOTE
Call Center TCM Note      Responses   [facility] patient discharged from? [facility]   Does the patient have one of the following disease processes/diagnoses(primary or secondary)? Other   TCM attempt successful? No   Unsuccessful attempts Attempt 2          Letty Sandhu RN    11/11/2021, 11:47 EST

## 2021-11-12 NOTE — OUTREACH NOTE
Call Center TCM Note      Responses   [facility] patient discharged from? [facility]   Does the patient have one of the following disease processes/diagnoses(primary or secondary)? Other   TCM attempt successful? No   Unsuccessful attempts Attempt 3          Tano Reagan RN    11/12/2021, 11:28 EST

## 2021-11-13 NOTE — PROGRESS NOTES
Check on Gabriel, happy to see him for hospital follow-up if he needs me to, if he is doing okay and planning on follow-up with his gastroenterologist that is fine, thanks

## 2021-11-25 PROBLEM — R63.4 WEIGHT LOSS: Status: ACTIVE | Noted: 2021-01-01

## 2021-11-25 PROBLEM — A09 DIARRHEA OF INFECTIOUS ORIGIN: Status: ACTIVE | Noted: 2021-01-01

## 2021-11-25 PROBLEM — E87.1 HYPONATREMIA: Status: ACTIVE | Noted: 2021-01-01

## 2021-11-28 PROBLEM — E44.0 MODERATE MALNUTRITION (HCC): Status: ACTIVE | Noted: 2021-01-01

## 2021-11-28 NOTE — OUTREACH NOTE
Prep Survey      Responses   [facility]? [facility]   Is LACE score < 7 ? No   Emergency Room discharge w/ pulse ox? No   Eligibility TCM   Hospital [facility]   Date of Admission 11/25/21   Date of Discharge 11/28/21   Discharge Disposition Home or Self Care   Discharge diagnosis diarrhea, ulcerative colitis, hyponatremia   Does the patient have one of the following disease processes/diagnoses(primary or secondary)? Other   Does the patient have Home health ordered? No   Is there a DME ordered? No   Prep survey completed? Yes          Paradise Edmondson RN

## 2021-11-29 NOTE — OUTREACH NOTE
Call Center TCM Note      Responses   [facility] patient discharged from? [facility]   Does the patient have one of the following disease processes/diagnoses(primary or secondary)? Other   TCM attempt successful? Yes   Call start time 1643   Call end time 1648   Discharge diagnosis diarrhea, ulcerative colitis, hyponatremia   Is patient permission given to speak with other caregiver? Yes   List who call center can speak with spouse, Radhabarak Sandhu   Person spoke with today (if not patient) and relationship spouse, Radhabarak Domínguez reviewed with patient/caregiver? Yes   Does the patient have all medications ordered at discharge? Yes   Is the patient taking all medications as directed (includes completed medication regime)? Yes   Does the patient have a primary care provider?  Yes   Does the patient have an appointment with their PCP within 7 days of discharge? Greater than 7 days   Comments regarding PCP Jose D Antunez MD PCP. Hospital follow up scheduled for 12/6/21  145pm.    Nursing Interventions Verified appointment date/time/provider  [Scheduled needed TCM appt. ]   Has the patient kept scheduled appointments due by today? N/A   Comments Patietn to also followup with GI 2 weeks.    Has home health visited the patient within 72 hours of discharge? N/A   Psychosocial issues? No   Did the patient receive a copy of their discharge instructions? Yes   Nursing interventions Reviewed instructions with patient   What is the patient's perception of their health status since discharge? Same  [Wife reports that patient is able to tolerate liquids and soft foods, but not regular diet. Advancing slowly. ]   Is the patient/caregiver able to teach back signs and symptoms related to disease process for when to call PCP? Yes   Is the patient/caregiver able to teach back the hierarchy of who to call/visit for symptoms/problems? PCP, Specialist, Home health nurse, Urgent Care, ED, 911 Yes   If the patient is a current smoker, are  they able to teach back resources for cessation? Not a smoker   TCM call completed? Yes   Wrap up additional comments Denies further questions or needs today.           Rosie Saunders RN    11/29/2021, 16:48 EST

## 2021-12-06 NOTE — PROGRESS NOTES
Subjective   Gabriel Sandhu is a 66 y.o. male.     Chief Complaint   Patient presents with   • Hospital Follow Up Visit     [facility] 11/25/21-11/28/21   • Malnutrition   • Diarrhea       Gabriel was seen at [facility] . He was admitted on 11/25/2021  for diarrhea and extreme weightloss. He was discharged on 11/28/21. Discharge diagnosis was malnutrition, ulcerative colitis, and hyponatremia. Labs that were performed during the encounter included: CMP-abnormal and CBC-abnormal. Diagnostic studies that were performed included: CT Scan-abdomen and pelvis shows Wall thickening and hyperemia of colon, suggesting pancolitis. Sigmoid diverticulosis. Several prominent urinary bladder diverticula. Bilateral L5 pars defects, with grade 1 anterolisthesis of L5 on S1. Currently Gabriel receives care at home. Complications from the hospital stay include malnutrition and insomnia. The patient stated that they do need help with their daily life and activities. The patient stated that they do have emotional support at home.    Gabriel was seen in the ER on 11/8/21 and was diagnosed with C-diff. This is reported to have resolved prior to the 11/25/21 stay.    Diarrhea   This is a new problem. The current episode started 1 to 4 weeks ago. The problem has been resolved. The patient states that diarrhea does not awaken him from sleep. Pertinent negatives include no abdominal pain, chills, coughing, fever, headaches or vomiting. Associated symptoms comments: Hiccups, insomnia. Nothing aggravates the symptoms.            I personally reviewed and updated the patient's allergies, medications, problem list, and past medical, surgical, social, and family history. I have reviewed and confirmed the accuracy of the History of Present Illness and Review of Symptoms as documented by the MA/NEHEMIAH/RN. Jose D Antunez MD    Family History   Problem Relation Age of Onset   • Alzheimer's disease Father    • Dementia Father    • No Known Problems  Mother    • No Known Problems Sister    • No Known Problems Brother    • No Known Problems Daughter    • No Known Problems Sister    • No Known Problems Brother    • No Known Problems Brother        Social History     Tobacco Use   • Smoking status: Former Smoker     Packs/day: 2.50     Years: 21.00     Pack years: 52.50     Types: Cigarettes     Start date:      Quit date:      Years since quittin.9   • Smokeless tobacco: Never Used   Vaping Use   • Vaping Use: Never used   Substance Use Topics   • Alcohol use: Yes     Comment: Drinks wine, Occasional alcohol use.   • Drug use: No       No past surgical history on file.    Patient Active Problem List   Diagnosis   • Annual visit for general adult medical examination with abnormal findings   • Colon cancer screening   • Screening PSA (prostate specific antigen)   • Ulcerative colitis (HCC)   • ED (erectile dysfunction)   • Generalized osteoarthritis   • History of tobacco use   • Hyperlipidemia   • Onychomycosis   • Welcome to Medicare preventive visit   • C. difficile colitis   • Hyponatremia   • Diarrhea of infectious origin   • Hyponatremia   • Weight loss   • Moderate malnutrition (CMS/HCC)   • Intractable hiccups       No current facility-administered medications for this visit.  No current outpatient medications on file.    Facility-Administered Medications Ordered in Other Visits:   •  acetaminophen (TYLENOL) tablet 650 mg, 650 mg, Oral, Once PRN **OR** acetaminophen (TYLENOL) suppository 650 mg, 650 mg, Rectal, Once PRN, Christos Diehl MD  •  acetaminophen (TYLENOL) tablet 650 mg, 650 mg, Oral, Q4H PRN **OR** acetaminophen (TYLENOL) suppository 650 mg, 650 mg, Rectal, Q4H PRN, Donna De La Torre MD  •  dextrose 5 % 850 mL with sodium bicarbonate 8.4 % 150 mEq infusion, , Intravenous, Continuous, Day, Leila, APRN, Last Rate: 250 mL/hr at 21, New Bag at 21  •  EPINEPHrine (ADRENALIN) 10 mg in sodium chloride 0.9 % 250 mL  infusion, 0.02-0.3 mcg/kg/min, Intravenous, Titrated, Day, Dawn, APRN  •  flumazenil (ROMAZICON) injection 0.5 mg, 0.5 mg, Intravenous, PRN, Christos Diehl MD  •  HYDROcodone-acetaminophen (NORCO) 5-325 MG per tablet 1 tablet, 1 tablet, Oral, Once PRN, Christos Diehl MD  •  HYDROcodone-acetaminophen (NORCO) 5-325 MG per tablet 1 tablet, 1 tablet, Oral, Q4H PRN, Donna De La Torre MD  •  ketorolac (TORADOL) injection 15 mg, 15 mg, Intravenous, Q6H PRN, Christos Diehl MD  •  LORazepam (ATIVAN) injection 0.5 mg, 0.5 mg, Intravenous, Q15 Min PRN, Christos Diehl MD  •  meperidine (DEMEROL) injection 12.5 mg, 12.5 mg, Intravenous, Q5 Min PRN, Christos Diehl MD  •  micafungin sodium (MYCAMINE) 150 mg in sodium chloride 0.9 % 100 mL IVPB, 150 mg, Intravenous, Q24H, Day, Dawn, APRN  •  Morphine sulfate (PF) injection 2 mg, 2 mg, Intravenous, Q5 Min PRN, Christos Diehl MD  •  Morphine sulfate (PF) injection 4 mg, 4 mg, Intravenous, Q2H PRN **AND** naloxone (NARCAN) injection 0.4 mg, 0.4 mg, Intravenous, Q5 Min PRN, Donna De La Torre MD  •  naloxone (NARCAN) injection 0.4 mg, 0.4 mg, Intravenous, PRN, Christos Diehl MD  •  norepinephrine (LEVOPHED) 16 mg in 250 mL NS infusion, 0.02-0.3 mcg/kg/min, Intravenous, Titrated, Day, Dawn, APRN  •  ondansetron (ZOFRAN) injection 4 mg, 4 mg, Intravenous, Once PRN, Christos Diehl MD  •  ondansetron (ZOFRAN) tablet 4 mg, 4 mg, Oral, Q6H PRN **OR** ondansetron (ZOFRAN) injection 4 mg, 4 mg, Intravenous, Q6H PRN, Donna De La Torre MD  •  phenylephrine (FILI-SYNEPHRINE) 100 mg in 250 mL NS infusion, 0.5-3 mcg/kg/min, Intravenous, Titrated, Day, KULDEEP Dee  •  piperacillin-tazobactam (ZOSYN) IVPB 3.375 g in 100 mL NS (CD), 3.375 g, Intravenous, Q8H, Donna De La Torre MD  •  promethazine (PHENERGAN) tablet 12.5 mg, 12.5 mg, Oral, Q6H PRN **OR** promethazine (PHENERGAN) suppository 12.5 mg, 12.5 mg, Rectal, Q6H PRN, Donna De La Torre MD  •  promethazine (PHENERGAN) suppository 25 mg, 25 mg,  "Rectal, Once PRN **OR** promethazine (PHENERGAN) tablet 25 mg, 25 mg, Oral, Once PRN, Christos Diehl MD  •  sodium chloride 0.9 % infusion, 100 mL/hr, Intravenous, Continuous, Donna De La Torre MD  •  Vasopressin (PITRESSIN) 20 Units in sodium chloride 0.9 % 100 mL infusion, 0.03 Units/min, Intravenous, Continuous, Christos Diehl MD, Last Rate: 15 mL/hr at 12/18/21 0700, 0.05 Units/min at 12/18/21 0700         Review of Systems   Constitutional: Negative for chills, diaphoresis and fever.   Eyes: Negative for visual disturbance.   Respiratory: Negative for cough and shortness of breath.    Cardiovascular: Negative for chest pain and palpitations.   Gastrointestinal: Positive for diarrhea. Negative for abdominal pain, nausea and vomiting.   Endocrine: Negative for polydipsia and polyphagia.   Musculoskeletal: Negative for neck stiffness.   Skin: Negative for color change and pallor.   Neurological: Negative for seizures and syncope.   Hematological: Negative for adenopathy.       I have reviewed and confirmed the accuracy of the ROS as documented by the MA/LPN/RN Jose D Antunez MD      Objective   BP 98/70   Pulse 113   Temp 98.2 °F (36.8 °C)   Resp 18   Ht 172.7 cm (68\")   Wt 62.7 kg (138 lb 3.2 oz)   SpO2 98%   BMI 21.01 kg/m²   BP Readings from Last 3 Encounters:   12/18/21 (!) 67/48   12/06/21 98/70   11/28/21 107/69     Wt Readings from Last 3 Encounters:   12/18/21 59 kg (130 lb)   12/06/21 62.7 kg (138 lb 3.2 oz)   11/28/21 63 kg (139 lb)     Physical Exam  Constitutional:       Appearance: Normal appearance. He is well-developed. He is not diaphoretic.   Cardiovascular:      Rate and Rhythm: Normal rate and regular rhythm.      Pulses: Normal pulses.      Heart sounds: Normal heart sounds, S1 normal and S2 normal. No murmur heard.  No friction rub. No gallop.    Pulmonary:      Effort: Pulmonary effort is normal. No accessory muscle usage.      Breath sounds: Normal breath sounds. No stridor. No " decreased breath sounds, wheezing, rhonchi or rales.   Abdominal:      General: Bowel sounds are normal. There is no distension.      Palpations: Abdomen is soft. Abdomen is not rigid. There is no mass or pulsatile mass.      Tenderness: There is no abdominal tenderness. There is no guarding or rebound. Negative signs include Barrientos's sign.      Hernia: No hernia is present.   Skin:     General: Skin is warm and dry.      Coloration: Skin is not pale.   Neurological:      Mental Status: He is alert and oriented to person, place, and time.      Cranial Nerves: No cranial nerve deficit.      Sensory: No sensory deficit.      Motor: No tremor, abnormal muscle tone or seizure activity.      Coordination: Coordination normal.      Gait: Gait normal.      Deep Tendon Reflexes: Reflexes are normal and symmetric.         Data / Lab Results:    No results found for: HGBA1C     Lab Results   Component Value Date     (H) 11/23/2020     (H) 12/18/2019     (H) 12/17/2018     Lab Results   Component Value Date    CHOL 229 (H) 12/17/2018    CHOL 247 (H) 12/15/2017    CHOL 203 (H) 11/26/2016     Lab Results   Component Value Date    TRIG 144 11/23/2020    TRIG 139 12/18/2019    TRIG 106 12/17/2018     Lab Results   Component Value Date    HDL 37 (L) 11/23/2020    HDL 39 (L) 12/18/2019    HDL 41 12/17/2018     Lab Results   Component Value Date    PSA 2.6 12/23/2020    PSA 2.5 12/18/2019    PSA 2.6 12/17/2018     Lab Results   Component Value Date    WBC 5.50 12/18/2021    HGB 8.1 (L) 12/18/2021    HCT 25.1 (L) 12/18/2021    MCV 91.9 12/18/2021     12/18/2021     Lab Results   Component Value Date    TSH 2.610 11/23/2020      Lab Results   Component Value Date    GLUCOSE 141 (H) 12/18/2021    BUN 46 (H) 12/18/2021    CREATININE 1.44 (H) 12/18/2021    EGFRIFNONA 49 (L) 12/18/2021    EGFRIFAFRI 109 12/06/2021    BCR 31.9 (H) 12/18/2021    K 6.4 (C) 12/18/2021    CO2 9.0 (L) 12/18/2021    CALCIUM 6.5 (L)  12/18/2021    PROTENTOTREF 5.9 (L) 12/06/2021    ALBUMIN 1.60 (L) 12/18/2021    LABIL2 1.0 (L) 12/06/2021    AST 33 12/18/2021    ALT 26 12/18/2021     Lab Results   Component Value Date    SEDRATE 56 (H) 11/08/2021      Lab Results   Component Value Date    CRP 3.75 (H) 11/27/2021      No results found for: IRON, TIBC, FERRITIN   No results found for: [id number]       Assessment/Plan      Medications        Problem List         LOS      Health maintenance..  Doing well, vaccines updated.  He does not tolerate baby aspirin.  Discussed health maintenance, screening test, and lifestyle modification.  EKG benign today, AAA screening ultrasound scheduled.  Hyperlipidemia.    Improved, mild elevation, overall improved on red yeast rice 2400 mg daily.  He is working hard on diet and exercise.  Recheck 1 year.  Colon cancer screening.  Colonoscopy current, followed by [facility].  Prostate cancer screening.    PSA benign 12/20.  Crohn's disease.  Overall stable on Rx per [facility].  Status post recent flare/C. difficile colitis.  Hematuria.  Recurrent, gross.  Status post Rx for prostatitis in November, no symptoms currently.  History of bladder diverticula per CT.  Culture sent, repeat CT scan, urology referral scheduled.  Call return if worsening symptoms.  Remote history of tobacco use.        Diagnoses and all orders for this visit:    1. Hyponatremia (Primary)  -     Comprehensive Metabolic Panel    2. Moderate malnutrition (CMS/HCC)    3. Ulcerative pancolitis with other complication (HCC)  -     CBC & Differential  -     Lipase    4. History of tobacco use    5. Abdominal pain, unspecified abdominal location  -     CBC & Differential  -     Comprehensive Metabolic Panel  -     Lipase    6. Intractable hiccups    Other orders  -     omeprazole (priLOSEC) 40 MG capsule; Take 1 capsule by mouth Daily.  Dispense: 30 capsule; Refill: 2  -     Discontinue: Baclofen 5 MG tablet; Take 5-10 mg by mouth 3 (Three) Times a Day As Needed  (moderate pain).  Dispense: 180 tablet; Refill: 2              Expected course, medications, and adverse effects discussed.  Call or return if worsening or persistent symptoms.  I wore protective equipment throughout this patient encounter including a mask, gloves, and eye protection.  Hand hygiene was performed before donning protective equipment and after removal when leaving the room. The complete contents of the Assessment and Plan and Data/Lab Results as documented above have been reviewed and addressed by myself with the patient today as part of an ongoing evaluation / treatment plan.  If some of the documentation has been copied from a previous note and is unchanged it indicates that this problem / plan has been assessed today but is stable from a previous visit and no changes have been recommended.

## 2021-12-08 NOTE — OUTREACH NOTE
Medical Week 2 Survey      Responses   [facility] patient discharged from? [facility]   Does the patient have one of the following disease processes/diagnoses(primary or secondary)? Other   Week 2 attempt successful? No   Unsuccessful attempts Attempt 1          Elaine Wagner LPN

## 2021-12-13 NOTE — TELEPHONE ENCOUNTER
I am sorry to hear that the baclofen is not helping, want him to discontinue that, should continue the omeprazole, go ahead and send in gabapentin 300 mg 3 times a day as needed #90 with 2 refills to help with the hiccups, he should let me know if his symptoms do not improve, thanks

## 2021-12-13 NOTE — TELEPHONE ENCOUNTER
Caller: Gabriel Sandhu    Relationship: Self    Best call back [phone]   :   What is the best time to reach you: ANYTIME    Who are you requesting to speak with (clinical staff, provider,  specific staff member): CLINICAL     Do you know the name of the person who called:     What was the call regarding: PATIENT CALLING STATING THAT HE HAS HAD A VERY SHARP PAIN IN HIS ABDOMIN ALL WEEKEND AND ALSO EXPERIENCING HICCUPS ANYTIME THROUGHOUT THE DAY EVEN THOUGH HE HAS BEEN TAKING THE OMEPRAZOLE AND BACLOFEN     Do you require a callback: YES

## 2021-12-16 NOTE — OUTREACH NOTE
Medical Week 3 Survey      Responses   [facility] patient discharged from? [facility]   Does the patient have one of the following disease processes/diagnoses(primary or secondary)? Other   Week 3 attempt successful? Yes   Call start time 0809   Call end time 0812   Discharge diagnosis diarrhea, ulcerative colitis, hyponatremia   Is patient permission given to speak with other caregiver? Yes   List who call center can speak with FREDDIE GARSIA    Person spoke with today (if not patient) and relationship FREDDIE GARSIA- WIFE   Meds reviewed with patient/caregiver? Yes   Is the patient having any side effects they believe may be caused by any medication additions or changes? No   Does the patient have all medications ordered at discharge? Yes   Is the patient taking all medications as directed (includes completed medication regime)? Yes   Does the patient have a primary care provider?  Yes   Comments regarding PCP PATIENT HAS SEEN HIS PCP AND GI DOCTOR   Has the patient kept scheduled appointments due by today? Yes   Has home health visited the patient within 72 hours of discharge? N/A   Psychosocial issues? No   Did the patient receive a copy of their discharge instructions? Yes   Nursing interventions Reviewed instructions with patient   What is the patient's perception of their health status since discharge? Improving   Is the patient/caregiver able to teach back signs and symptoms related to disease process for when to call PCP? Yes   Is the patient/caregiver able to teach back signs and symptoms related to disease process for when to call 911? Yes   Is the patient/caregiver able to teach back the hierarchy of who to call/visit for symptoms/problems? PCP, Specialist, Home health nurse, Urgent Care, ED, 911 Yes   If the patient is a current smoker, are they able to teach back resources for cessation? Not a smoker   Week 3 Call Completed? Yes          Elaine Wagner LPN

## 2021-12-18 PROBLEM — R06.6 INTRACTABLE HICCUPS: Status: ACTIVE | Noted: 2021-01-01

## 2021-12-18 NOTE — CONSULTS
GENERAL SURGERY CONSULT      Patient Care Team:  Jose D Antunez MD as PCP - General (Family Medicine)  German Goodson MD as Consulting Physician (Gastroenterology)    Chief complaint perforated viscus  Subjective     This is a 66-year-old gentleman with a history of ulcerative colitis and recent C. difficile colitis who presented to [facility] emergency room with 2 days of abdominal pain and CT scan demonstrating free air.  He has significant abdominal pain and distention.  2 days ago he did start taking Imodium because of his significant diarrhea.  He has not had a bowel movement since then.  He is followed by Dr. Goodson.  He last saw Leslie Storey in the office on December 9.  She noted he had significant weight loss recently and recommended Ensure 3 times daily.  He is on Lialda and azathioprine.  He has not been on any steroids over the last week but did finish a tapering dose about a week ago.  His only abdominal surgery is an umbilical hernia repair  He presents to the emergency room hypotensive.  Stress dose steroids have been ordered and fluid boluses have been given.  He was transferred from [facility] emergency room to [place] emergency room.    Review of Systems   All systems were reviewed and negative except for:  Gastrointestinal: positive for  nausea, pain and See HPI    History  Past Medical History:   Diagnosis Date   • Annual physical exam     Impression: doing well, vaccines currrent Age specific anticipatory guidance and warning signs discussed. Diet, exercise, and lifestyle modification discussed. Safety, seatbelts, and routine screening examinations discussed. Discussed self-examinations.   • Annual visit for general adult medical examination with abnormal findings     Impression: doing well, vaccines current Age specific anticipatory guidance and warning signs discussed. Diet, exercise, and lifestyle modification discussed. Safety, seatbelts, and routine screening examinations  discussed. Discussed self-examinations.   • C. 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 Consider imaging if persistent symptoms.   • History of chickenpox    • History of tobacco use    • Hyperlipidemia     Impression: Discussed diet, exercise, lifestyle modification. moderate elevation, he's working hard on diet, exercise, exercising daily improved on red yeast rice at 2400mg daily Follow up recheck ------------- Stable Compliant with meds Is getting adequate diet and exercise Goals developed at last visit were met Care management needs are self addressed.   • Onychomycosis     Impression: Topical care discussed. Call / return if persistent symptoms.   • Overweight (BMI 25.0-29.9)    • Prostatitis, acute    • Screening PSA (prostate specific antigen)     Impression: renuka with mild, symmetric enlargement psa normal   • UC (ulcerative colitis) (HCC)      No past surgical history on file.  Family History   Problem Relation Age of Onset   • Alzheimer's disease Father    • Dementia Father    • No Known Problems Mother    • No Known Problems Sister    • No Known Problems Brother    • No Known Problems Daughter    • No Known Problems Sister    • No Known Problems Brother    • No Known Problems Brother      Social History     Tobacco Use   • Smoking status: Former Smoker     Packs/day: 2.50     Years: 21.00     Pack years: 52.50     Types: Cigarettes     Start date:      Quit date:      Years since quittin.9   • Smokeless tobacco: Never Used   Vaping Use   • Vaping Use: Never used   Substance Use Topics   • Alcohol use: Yes     Comment: Drinks wine, Occasional alcohol use.   • Drug use: No     (Not in a hospital admission)    Allergies:  Patient  has no known allergies.    Objective     Vital Signs  Temp:  [97.6 °F (36.4 °C)] 97.6 °F (36.4 °C)  Heart Rate:  [] 92  Resp:  [24] 24  BP: ()/(51-68) 84/61    Physical Exam:      General Appearance:   Patient appears to be septic   Head:    Normocephalic, without obvious abnormality, atraumatic,    Eyes:            Lids and lashes normal, conjunctivae and sclerae normal, no   icterus, no pallor, corneas clear, PERRLA   Ears:    Ears appear intact with no abnormalities noted   Throat:   No oral lesions, no thrush, oral mucosa moist   Neck:   No adenopathy, supple, trachea midline, no thyromegaly, no   carotid bruit, no JVD   Back:     No kyphosis present, no scoliosis present, no skin lesions,      erythema or scars, no tenderness to percussion or                   palpation,   range of motion normal   Lungs:    Expiratory wheeze and also rales on the left side    Heart:    Regular rhythm and normal rate, normal S1 and S2, no            murmur, no gallop, no rub, no click   Chest Wall:    No abnormalities observed   Abdomen:    Tympanic distended abdomen that is diffusely tender.   Rectal:     Deferred   Extremities: No edema, good ROM   Pulses:   Pulses palpable and equal bilaterally   Skin:   No bleeding, bruising or rash   Lymph nodes:   No palpable adenopathy   Neurologic:   Cranial nerves 2 - 12 grossly intact, sensation intact, DTR       present and equal bilaterally     Lab Results (last 24 hours)     Procedure Component Value Units Date/Time    COVID PRE-OP / PRE-PROCEDURE SCREENING ORDER (NO ISOLATION) - Swab, Nasopharynx [[id number]]  (Normal) Collected: 12/18/21 0352    Specimen: Swab from Nasopharynx Updated: 12/18/21 0439    Narrative:      The following orders were created for panel order COVID PRE-OP / PRE-PROCEDURE SCREENING ORDER (NO ISOLATION) - Swab, Nasopharynx.  Procedure                               Abnormality         Status                     ---------                                -----------         ------                     COVID-19,CEPHEID/TAMMIE,CO...[[id number]]  Normal              Final result                 Please view results for these tests on the individual orders.    COVID-19,CEPHEID/TAMMIE,COR/AYAH/PAD/YVONNE IN-HOUSE(OR EMERGENT/ADD-ON),NP SWAB IN TRANSPORT MEDIA 3-4 HR TAT, RT-PCR - Swab, Nasopharynx [[id number]]  (Normal) Collected: 12/18/21 0352    Specimen: Swab from Nasopharynx Updated: 12/18/21 0439     COVID19 Not Detected    Narrative:      Fact sheet for providers: https://www.fda.gov/media/297076/download     Fact sheet for patients: https://www.fda.gov/media/219904/download  Fact sheet for providers: https://www.fda.gov/media/163834/download    Fact sheet for patients: https://www.fda.gov/media/410383/download    Test performed by PCR.    Protime-INR [[id number]] Collected: 12/18/21 0425    Specimen: Blood Updated: 12/18/21 0425    Extra Tubes [[id number]] Collected: 12/18/21 0309    Specimen: Blood, Venous Line Updated: 12/18/21 0416    Narrative:      The following orders were created for panel order Extra Tubes.  Procedure                               Abnormality         Status                     ---------                               -----------         ------                     Gold Top - SST[[id number]]                                   Final result                 Please view results for these tests on the individual orders.    Gold Top - SST [[id number]] Collected: 12/18/21 0309    Specimen: Blood Updated: 12/18/21 0416     Extra Tube Hold for add-ons.     Comment: Auto resulted.       Manual Differential [[id number]]  (Abnormal) Collected: 12/18/21 0309    Specimen: Blood Updated: 12/18/21 0354     Neutrophil % 43.0 %      Lymphocyte % 12.0 %      Monocyte % 6.0 %      Bands %  35.0 %      Metamyelocyte % 4.0 %      Neutrophils Absolute 6.94 10*3/mm3      Lymphocytes Absolute 1.07 10*3/mm3      Monocytes Absolute 0.53 10*3/mm3      nRBC 1.0 /100 WBC       Anisocytosis Slight/1+     WBC Morphology Normal     Platelet Estimate Adequate    CBC & Differential [[id number]]  (Abnormal) Collected: 12/18/21 0309    Specimen: Blood Updated: 12/18/21 0354    Narrative:      The following orders were created for panel order CBC & Differential.  Procedure                               Abnormality         Status                     ---------                               -----------         ------                     CBC Auto Differential[[id number]]        Abnormal            Final result               Scan Slide[[id number]]                                       Final result                 Please view results for these tests on the individual orders.    Scan Slide [[id number]] Collected: 12/18/21 0309    Specimen: Blood Updated: 12/18/21 0354     Scan Slide --     Comment: See Manual Differential Results       CBC Auto Differential [[id number]]  (Abnormal) Collected: 12/18/21 0309    Specimen: Blood Updated: 12/18/21 0354     WBC 8.90 10*3/mm3      RBC 3.44 10*6/mm3      Hemoglobin 10.6 g/dL      Hematocrit 32.4 %      MCV 94.4 fL      MCH 30.8 pg      MCHC 32.6 g/dL      RDW 16.3 %      RDW-SD 53.4 fl      MPV 7.8 fL      Platelets 343 10*3/mm3     Narrative:      The previously reported component NRBC is no longer being reported. Previous result was 0.5 /100 WBC (Reference Range: 0.0-0.2 /100 WBC) on 12/18/2021 at 0320 EST.    Blood Gas, Arterial - [[id number]]  (Abnormal) Collected: 12/18/21 0347    Specimen: Arterial Blood Updated: 12/18/21 0349     Site Right Radial     Tobias's Test Positive     pH, Arterial 7.239 pH units      pCO2, Arterial 20.1 mm Hg      pO2, Arterial 82.9 mm Hg      HCO3, Arterial 8.6 mmol/L      Base Excess, Arterial -17.0 mmol/L      Comment: Serial Number: [id number]:  [id number]        O2 Saturation, Arterial 94.4 %      CO2 Content 9.2 mmol/L      Barometric Pressure for Blood Gas --     Comment: N/A        Modality Cannula     FIO2 <21 %      Rate  22.0000 Breaths/minute      Hemodilution No    Troponin [[id number]]  (Abnormal) Collected: 12/18/21 0309    Specimen: Blood Updated: 12/18/21 0346     Troponin T 0.164 ng/mL     Narrative:      Troponin T Reference Range:  <= 0.03 ng/mL-   Negative for AMI  >0.03 ng/mL-     Abnormal for myocardial necrosis.  Clinicians would have to utilize clinical acumen, EKG, Troponin and serial changes to determine if it is an Acute Myocardial Infarction or myocardial injury due to an underlying chronic condition.       Results may be falsely decreased if patient taking Biotin.      Comprehensive Metabolic Panel [[id number]]  (Abnormal) Collected: 12/18/21 0309    Specimen: Blood Updated: 12/18/21 0346     Glucose 141 mg/dL      BUN 46 mg/dL      Creatinine 1.44 mg/dL      Sodium 123 mmol/L      Potassium 6.4 mmol/L      Chloride 94 mmol/L      CO2 9.0 mmol/L      Calcium 6.5 mg/dL      Total Protein 5.2 g/dL      Albumin 1.60 g/dL      ALT (SGPT) 26 U/L      AST (SGOT) 33 U/L      Alkaline Phosphatase 130 U/L      Total Bilirubin 0.4 mg/dL      eGFR Non African Amer 49 mL/min/1.73      Globulin 3.6 gm/dL      A/G Ratio 0.4 g/dL      BUN/Creatinine Ratio 31.9     Anion Gap 20.0 mmol/L     Narrative:      GFR Normal >60  Chronic Kidney Disease <60  Kidney Failure <15      POC Lactate [[id number]]  (Abnormal) Collected: 12/18/21 0316    Specimen: Blood Updated: 12/18/21 0318     Lactate 5.0 mmol/L      Comment: Serial Number: [id number]:  [id number]             Imaging Results (Last 24 Hours)     Procedure Component Value Units Date/Time    XR Chest 1 View [[id number]] Resulted: 12/18/21 0343     Updated: 12/18/21 0343          Results Review:    I reviewed the patient's new clinical results.  I reviewed the patient's new imaging results and agree with the interpretation.    Assessment/Plan   Perforated viscus  Ulcerative colitis  Sepsis    66-year-old gentleman with ulcerative colitis and now perforated viscus.  He had recent  C. difficile colitis.  He is hypotensive and tachycardic.  He is acidotic and septic.  He may not survive an operation.  I believe the ideal operation would be a subtotal colectomy but I am not sure he could endure such a longer operation like that and I may be forced to perform just a segmental resection of the affected portion of the colon with end ileostomy.  I discussed with the him and his wife.  At this time they are just hoping he survives and want what ever is best for him.  I discussed the case with Dr. Tadeo who agrees he is very ill and may not even survive induction.  Full code.    Donna De La Torre MD  12/18/21  04:57 EST

## 2021-12-18 NOTE — OP NOTE
LAPAROTOMY EXPLORATORY  Procedure Report    Patient Name:  Gabriel Sandhu  YOB: 1955    Date of Surgery:  12/18/2021     Indications: This is a 66-year-old gentleman with a history of ulcerative colitis who last month had C. difficile colitis and presented to the emergency room with abdominal pain.  CT scan demonstrated free air and it was suspected he had perforated colon.    Pre-op Diagnosis:   Perforated colon       Post-Op Diagnosis Codes:  Perforated transverse colon and ischemia of the transverse colon with feculent peritonitis    Procedure/CPT® Codes:      Procedure(s):  LAPAROTOMY EXPLORATORY, EXTENDED RIGHT HEMICOLECTOMY AND END ILEOSTOMY    Staff:  Surgeon(s):  Donna De La Torre MD         was responsible for performing the following activities: Retraction, Suction, Irrigation, Suturing, Closing, and Placing Dressing and their skilled assistance was necessary for the success of this case.   Anesthesia: General    Estimated Blood Loss: 350 mL    Implants:    Implant Name Type Inv. Item Serial No.  Lot No. LRB No. Used Action   RELOAD STPLR LNR CUT PROX 75MM JOHN [id number] - [id number] Implant RELOAD STPLR LNR CUT PROX 75MM JOHN [id number]  [employer]  AND  [id number] N/A 1 Implanted   STPLR LNR CUT PROX 75MM JOHN [id number] - [id number] Implant STPLR LNR CUT PROX 75MM JOHN [id number]  [employer]  DIV [employer]  AND  [id number] N/A 1 Implanted   SEAL HEMO ABS ANGELO AH PWDR 3GM - [id number] Implant SEAL HEMO ABS ANGELO AH PWDR 3GM  [employer] [id number] N/A 2 Implanted   KT SEAL HEMOS ABS FLOSEAL MATRX FAST/PREP 10ML - [id number] Implant KT SEAL HEMOS ABS FLOSEAL MATRX FAST/PREP 10ML  [employer] [id number] N/A 1 Implanted   RELOAD STPLR LNR CUT PROX 75MM JOHN [id number] - [id number] Implant RELOAD STPLR LNR CUT PROX 75MM JOHN [id number]  [employer]  DIV OF  AND  [id number] N/A 1 Implanted       Specimen:          Specimens     ID Source Type Tests Collected By  Collected At Frozen?    1 Peritoneum Peritoneal Fluid · ANAEROBIC CULTURE  · BODY FLUID CULTURE   Donna De La Torre MD 12/18/21 0614     A Large Intestine, Right / Ascending Colon Tissue · TISSUE PATHOLOGY EXAM   Donna De La Torre MD 12/18/21 0657     Description: abd transverse colon    B Small Intestine, Ileum Tissue · TISSUE PATHOLOGY EXAM   Donna De La Torre MD 12/18/21 0657     Description: distal ileum              Findings: There was liquid stool throughout the abdomen.  There was a large hole in the mid transverse colon.  The splenic flexure appeared ischemic.  There was a lot of inflammation of the transverse colon to the omentum which made mobilization difficult.  Was also inflammation of the splenic flexure which made mobilization difficult.    Complications: none    Description of Procedure: General and a trach anesthesia was provided.  Silva catheter was placed.  An arterial line was placed by the anesthesia staff.  The abdomen is prepped and draped in a sterile fashion.  Made a midline incision from epigastrium down to the pubis.  Incise through the abdominal tissues.  There was liquid stool throughout the abdomen.  This was suctioned away.  I inspected the entire colon and discovered a large defect of the mid transverse colon.  There was a lot of omentum adhesed to it.  I began mobilization of the cecum and terminal ileum from the retroperitoneal structures by making a cautery incision along the line of Toldt in this area.  I continued this blunt and cautery mobilization up the ascending colon and took down the hepatocolic ligament with an Enseal device.  I then stapled off the terminal ileum with a 75 TALITA stapling device.  I then used the Enseal device to divide the mesentery of the ascending colon and hepatic flexure.  As I was mobilizing the transverse colon from the duodenum and pancreas I did get into some venous bleeding and I believe it was likely the anterior superior pancreatico duodenal vein.  I did use  some 3-0 Prolene to suture this to obtained hemostasis.  There was no significant blood loss.  Throughout the case I believe only had about 300 cc of blood loss.  I continued to use the Enseal device to take the mesentery of the transverse colon and distal transverse colon.  The inflammation and also caused dense adhesions in the left upper quadrant at the splenic flexure.  I then turned my attention to the descending colon and made a cautery incision in the line of Toldt at this area and a combination of blunt dissection and cautery was used to mobilize the proximal descending colon and then I worked my way up to the splenic flexure from this point and was able to complete the mobilization of the splenic flexure.  A 75 TALITA staple device was then used to staple off the proximal descending colon.  The patient was very unstable and hypotensive and maxed out on pressors.  I chose not to pursue trying to resect the descending and sigmoid colon because I felt we needed to finish the operation as soon as possible.  The peritoneum was irrigated with normal saline.  I did spray some Jillian in the retroperitoneum of the right paracolic gutter and left upper quadrant and also put some FloSeal at the area of the pancreas that I had some bleeding previously but at the time there was no active bleeding.    I then performed an end ileostomy.  I made a circular skin incision in the right lower quadrant and divided the abdominal wall tissues and then brought out the terminal ileum and check to make sure there was no twisting.  I then placed 219 Nicholas drains one coming out the abdominal wall in the left lower quadrant and one in the right upper quadrant.  The one on the left goes up the left paracolic gutter and terminates in the area about the spleen.  The one on the right is in the right paracolic gutter and the tip is lateral to the liver.  These were secured to the skin with 2-0 silk suture.  The fascia was closed #1 PDS.  The  skin was left open and packed with gauze.  The ileum was matured with 3-0 Vicryl.      Donna De La Torre MD     Date: 12/18/2021  Time: 07:37 EST

## 2021-12-18 NOTE — ANESTHESIA PREPROCEDURE EVALUATION
Anesthesia Evaluation                  Airway   Mallampati: II  TM distance: >3 FB  Neck ROM: full  No difficulty expected  Dental - normal exam     Pulmonary - normal exam    breath sounds clear to auscultation  Cardiovascular     Rhythm: regular  Rate: abnormal    (+) hyperlipidemia,       Neuro/Psych  GI/Hepatic/Renal/Endo    (+)  GI bleeding lower active bleeding,     Musculoskeletal     Abdominal   (+) obese,     Abdomen: soft and tender.  Bowel sounds: decreased.   Substance History      OB/GYN          Other   arthritis,                    Anesthesia Plan    ASA 4 - emergent     general     intravenous induction   Postoperative Plan: Expected vent after surgery  Anesthetic plan, all risks, benefits, and alternatives have been provided, discussed and informed consent has been obtained with: patient.  Use of blood products discussed with patient  Consented to blood products.

## 2021-12-18 NOTE — ED PROVIDER NOTES
Subjective   History of Present Illness  66-year-old male presents from outside hospital after he had syncopal episode.  Family thought that he had cardiac arrest and he did have brief CPR at home.  At outside hospital patient was found to have free intra-abdominal air.  He received IV fluids antibiotics with Zosyn was started on Levophed and was transferred here for surgical evaluation.  Patient reports he started having abdominal pain yesterday.  Wife reports that his abdomen became significantly swollen.  He reports no vomiting or diarrhea.  He has had recent treatment for C. difficile colitis.  He has complained of abdominal pain at this time.  Review of Systems  Complete review of systems not obtained due to patient critical illness  Past Medical History:   Diagnosis Date   • Annual physical exam     Impression: doing well, vaccines currrent Age specific anticipatory guidance and warning signs discussed. Diet, exercise, and lifestyle modification discussed. Safety, seatbelts, and routine screening examinations discussed. Discussed self-examinations.   • Annual visit for general adult medical examination with abnormal findings     Impression: doing well, vaccines current Age specific anticipatory guidance and warning signs discussed. Diet, exercise, and lifestyle modification discussed. Safety, seatbelts, and routine screening examinations discussed. Discussed self-examinations.   • C. 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 Consider imaging if persistent symptoms.   • History of chickenpox    • History of tobacco use    • Hyperlipidemia     Impression: Discussed diet, exercise, lifestyle modification. moderate elevation, he's working hard on  diet, exercise, exercising daily improved on red yeast rice at 2400mg daily Follow up recheck ------------- Stable Compliant with meds Is getting adequate diet and exercise Goals developed at last visit were met Care management needs are self addressed.   • Onychomycosis     Impression: Topical care discussed. Call / return if persistent symptoms.   • Overweight (BMI 25.0-29.9)    • Prostatitis, acute    • Screening PSA (prostate specific antigen)     Impression: renuka with mild, symmetric enlargement psa normal   • UC (ulcerative colitis) (HCC)        No Known Allergies    No past surgical history on file.    Family History   Problem Relation Age of Onset   • Alzheimer's disease Father    • Dementia Father    • No Known Problems Mother    • No Known Problems Sister    • No Known Problems Brother    • No Known Problems Daughter    • No Known Problems Sister    • No Known Problems Brother    • No Known Problems Brother        Social History     Socioeconomic History   • Marital status:    Tobacco Use   • Smoking status: Former Smoker     Packs/day: 2.50     Years: 21.00     Pack years: 52.50     Types: Cigarettes     Start date:      Quit date:      Years since quittin.9   • Smokeless tobacco: Never Used   Vaping Use   • Vaping Use: Never used   Substance and Sexual Activity   • Alcohol use: Yes     Comment: Drinks wine, Occasional alcohol use.   • Drug use: No   • Sexual activity: Defer     Prior to Admission medications    Medication Sig Start Date End Date Taking? Authorizing Provider   azaTHIOprine (IMURAN) 75 MG tablet Take 1 tablet by mouth Daily. 21   Sonja Sanchez MD   colesevelam (Welchol) 625 MG tablet Take 1,875 mg by mouth 2 (Two) Times a Day With Meals.    ProviderIris MD   folic acid (FOLVITE) 800 MCG tablet Take 800 mcg by mouth Daily.    ProviderIris MD   gabapentin (NEURONTIN) 300 MG capsule Take 1 capsule by mouth 3 (Three) Times a Day. 21    Jose D Antunez MD   mesalamine (LIALDA) 1.2 g EC tablet Take 4,800 mg by mouth Daily.    Provider, MD Iris   omeprazole (priLOSEC) 40 MG capsule Take 1 capsule by mouth Daily. 12/6/21   Jose D Antunez MD   predniSONE (DELTASONE) 10 MG tablet Take 1 tablet by mouth Daily. 40 mg QD for 3 days, than 30 mg po once QD for 3 days, than 20 mg QD for 3 days, than 10 mg QD for 3 days. 11/28/21   Sonja Sanchez MD   saccharomyces boulardii (Florastor) 250 MG capsule Take 1 capsule by mouth 2 (Two) Times a Day. 11/28/21   Sonja Sanchez MD   vardenafil (Levitra) 20 MG tablet Take 20 mg by mouth Daily As Needed for Erectile Dysfunction. 1 hour prior to sexual activity    Provider, MD Iris           Objective   Physical Exam  66-year-old male awake alert.  He is ill tachypneic.  He is cachectic.  Pupils equal round at light.  Neck supple chest clear cardiovascular rhythm abdomen is distended with some diffuse tenderness.  Extremities without tenderness edema.  Neurologically without focal findings noted.  Central Line At Bedside    Date/Time: 12/18/2021 3:41 AM  Performed by: Mu Méndez MD  Authorized by: Mu Méndez MD     Consent:     Consent obtained:  Emergent situation    Consent given by:  Patient  Pre-procedure details:     Hand hygiene: Hand hygiene performed prior to insertion      Sterile barrier technique: All elements of maximal sterile technique followed      Skin preparation:  2% chlorhexidine    Skin preparation agent: Skin preparation agent completely dried prior to procedure    Anesthesia (see MAR for exact dosages):     Anesthesia method:  Local infiltration    Local anesthetic:  Lidocaine 1% w/o epi  Procedure details:     Location:  R internal jugular    Patient position:  Flat    Procedural supplies:  Triple lumen    Catheter size:  7.5 Fr    Landmarks identified: yes      Ultrasound guidance: yes      Number of attempts:  1    Successful placement: yes     Post-procedure details:     Post-procedure:  Dressing applied and line sutured    Assessment:  Blood return through all ports, no pneumothorax on x-ray, placement verified by x-ray and free fluid flow    Patient tolerance of procedure:  Tolerated well, no immediate complications               ED Course      Results for orders placed or performed during the hospital encounter of 12/18/21   Comprehensive Metabolic Panel    Specimen: Blood   Result Value Ref Range    Glucose 141 (H) 65 - 99 mg/dL    BUN 46 (H) 8 - 23 mg/dL    Creatinine 1.44 (H) 0.76 - 1.27 mg/dL    Sodium 123 (L) 136 - 145 mmol/L    Potassium 6.4 (C) 3.5 - 5.2 mmol/L    Chloride 94 (L) 98 - 107 mmol/L    CO2 9.0 (L) 22.0 - 29.0 mmol/L    Calcium 6.5 (L) 8.6 - 10.5 mg/dL    Total Protein 5.2 (L) 6.0 - 8.5 g/dL    Albumin 1.60 (L) 3.50 - 5.20 g/dL    ALT (SGPT) 26 1 - 41 U/L    AST (SGOT) 33 1 - 40 U/L    Alkaline Phosphatase 130 (H) 39 - 117 U/L    Total Bilirubin 0.4 0.0 - 1.2 mg/dL    eGFR Non African Amer 49 (L) >60 mL/min/1.73    Globulin 3.6 gm/dL    A/G Ratio 0.4 g/dL    BUN/Creatinine Ratio 31.9 (H) 7.0 - 25.0    Anion Gap 20.0 (H) 5.0 - 15.0 mmol/L   CBC Auto Differential    Specimen: Blood   Result Value Ref Range    WBC 8.90 3.40 - 10.80 10*3/mm3    RBC 3.44 (L) 4.14 - 5.80 10*6/mm3    Hemoglobin 10.6 (L) 13.0 - 17.7 g/dL    Hematocrit 32.4 (L) 37.5 - 51.0 %    MCV 94.4 79.0 - 97.0 fL    MCH 30.8 26.6 - 33.0 pg    MCHC 32.6 31.5 - 35.7 g/dL    RDW 16.3 (H) 12.3 - 15.4 %    RDW-SD 53.4 37.0 - 54.0 fl    MPV 7.8 6.0 - 12.0 fL    Platelets 343 140 - 450 10*3/mm3   Troponin    Specimen: Blood   Result Value Ref Range    Troponin T 0.164 (C) 0.000 - 0.030 ng/mL   Scan Slide    Specimen: Blood   Result Value Ref Range    Scan Slide     Manual Differential    Specimen: Blood   Result Value Ref Range    Neutrophil % 43.0 42.7 - 76.0 %    Lymphocyte % 12.0 (L) 19.6 - 45.3 %    Monocyte % 6.0 5.0 - 12.0 %    Bands %  35.0 (H) 0.0 - 5.0 %     "Metamyelocyte % 4.0 (H) 0.0 - 0.0 %    Neutrophils Absolute 6.94 1.70 - 7.00 10*3/mm3    Lymphocytes Absolute 1.07 0.70 - 3.10 10*3/mm3    Monocytes Absolute 0.53 0.10 - 0.90 10*3/mm3    nRBC 1.0 (H) 0.0 - 0.2 /100 WBC    Anisocytosis Slight/1+ None Seen    WBC Morphology Normal Normal    Platelet Estimate Adequate Normal   Blood Gas, Arterial -    Specimen: Arterial Blood   Result Value Ref Range    Site Right Radial     Tobias's Test Positive     pH, Arterial 7.239 (L) 7.350 - 7.450 pH units    pCO2, Arterial 20.1 (L) 35.0 - 48.0 mm Hg    pO2, Arterial 82.9 (L) 83.0 - 108.0 mm Hg    HCO3, Arterial 8.6 (L) 21.0 - 28.0 mmol/L    Base Excess, Arterial -17.0 (L) 0.0 - 3.0 mmol/L    O2 Saturation, Arterial 94.4 94.0 - 98.0 %    CO2 Content 9.2 (L) 22 - 29 mmol/L    Barometric Pressure for Blood Gas      Modality Cannula     FIO2 <21 %    Rate 22.0000 Breaths/minute    Hemodilution No    POC Lactate    Specimen: Blood   Result Value Ref Range    Lactate 5.0 (C) 0.5 - 2.0 mmol/L   ECG 12 Lead   Result Value Ref Range    QT Interval 335 ms     No radiology results for the last day  Medications   hydrocortisone sodium succinate (Solu-CORTEF) injection 50 mg (has no administration in time range)   sodium bicarbonate 8.4 % 150 mEq in dextrose (D5W) 5 % 1,000 mL infusion (greater than 75 mEq) (has no administration in time range)   lactated ringers bolus 1,000 mL (0 mL Intravenous Stopped 12/18/21 8447)   pantoprazole (PROTONIX) injection 40 mg (40 mg Intravenous Given 12/18/21 5023)     BP 91/64   Pulse 95   Temp 97.6 °F (36.4 °C) (Oral)   Resp 24   Ht 172.7 cm (68\")   Wt 59 kg (130 lb)   SpO2 100%   BMI 19.77 kg/m²                                              MDM   Chart review: Patient had been discharged on the 28th.  He had been admitted with diarrhea of infectious origin with moderate malnutrition and ulcerative colitis.  Comorbidity: As per past history  Differential: Perforated viscus septic shock  My EKG " interpretation: Sinus rhythm left atrial abnormality low voltage extremity leads compared to previous no significant change  Lab: Lactic acid 5.0 CBC white count 8.9 with hemoglobin 10.6 platelet count 343 lactic acid 5.0 arterial blood gas reveals pH 7.23 PCO2 20 PO2 of 82 troponin elevated 0.164 comprehensive metabolic panel BUN 46 creatinine 1.44 sodium 123 potassium 6.4 CO2 9 with calcium 6.5 total protein 5.2 albumin 1.6.  Radiology: I reviewed chest x-ray.  Right central internal jugular catheter in good position.  He is noted to have massive free air.  Discussion/treatment: Patient outside film reviewed.  Noted to have massive free air patient had emergent central line placement.  He received additional 1 L of IV fluids.  NG tube was ordered.  Patient was discussed with Dr. De La Torre arrangements are being made for emergent laparotomy.  Patient critical care time of 35 minutes independent of procedures.  He was given Protonix for ulcer prophylaxis.  Patient has been on steroids he was given hydrocortisone 50 mg IV due to the potential of adrenal suppression.  Patient was started on IV fluids D5W 3 Amps of bicarbonate 125 cc/h.  Patient was also discussed with the nurse practitioner for the intensivist.  Patient was noted to have improvement in his perfusion post fluid bolus.  He received 2 L prior to arrival received an additional liter here.  Patient was evaluated singappropriatePPE    SEPTIC SHOCK FOCUSED EXAM ATTESTATION    I attest that I have reassessed tissue perfusion after the fluid bolus given.    Mu Méndez MD  12/18/21  04:00 EST    Final diagnoses:   Perforated viscus   Septic shock (HCC)       ED Disposition  ED Disposition     ED Disposition Condition Comment    Decision to Admit            No follow-up provider specified.       Medication List      No changes were made to your prescriptions during this visit.          Mu Méndez MD  12/18/21 0303

## 2021-12-18 NOTE — NURSING NOTE
Norepinephrine started at [facility]  At .1354 mcg/kg/min was running on arrival to [place]

## 2021-12-18 NOTE — ANESTHESIA POSTPROCEDURE EVALUATION
Patient: Gabriel Sandhu    Procedure Summary     Date: 12/18/21 Room / Location: [facility] OR  / [facility] MAIN OR    Anesthesia Start: 0535 Anesthesia Stop: 0743    Procedure: LAPAROTOMY EXPLORATORY, EXTENDED RIGHT HEMICOLECTOMY AND END ILEOSTOMY (N/A Abdomen) Diagnosis:     Surgeons: Donna De La Torre MD Provider: Christos Diehl MD    Anesthesia Type: general ASA Status: 4 - Emergent          Anesthesia Type: general    Vitals  Vitals Value Taken Time   BP     Temp     Pulse 108 12/18/21 0743   Resp 20 12/18/21 0738   SpO2 83 % 12/18/21 0743   Vitals shown include unvalidated device data.        Post Anesthesia Care and Evaluation    Patient location during evaluation: ICU  Patient participation: complete - patient cannot participate  Level of consciousness: obtunded/minimal responses  Pain scale: See nurse's notes for pain score.  Pain management: adequate  Airway patency: patent  Anesthetic complications: No anesthetic complications  PONV Status: none  Cardiovascular status: unstable and hemodynamically unstable  Respiratory status: ventilator and acceptable  Hydration status: hypovolemic    Comments: Pt septic. Unlikely to survive next 12-24 hours.  Surgeon aware

## 2021-12-19 PROBLEM — R19.8 PERFORATED VISCUS: Status: ACTIVE | Noted: 2021-01-01

## 2021-12-19 NOTE — NURSING NOTE
Patient came back PACU to ICU. BP unstable and patient currently maxed on lea, levo, epi, vaso, and dopamine for blood pressure. Vent settings are currently at 80%. Family is at bedside and is aware of patients condition. We will continue to treat and monitor for changes.

## 2021-12-19 NOTE — NURSING NOTE
Spoke with Dr. Pinedo with hematology. Per blood bank the hospital is low on O Neg blood. It is okay to transfuse O positive.

## 2021-12-19 NOTE — OUTREACH NOTE
Medical Week 4 Survey      Responses   [facility] patient discharged from? [facility]   Does the patient have one of the following disease processes/diagnoses(primary or secondary)? Other   Week 4 attempt successful? No   Revoke Readmitted          Emiliana López RN

## 2021-12-19 NOTE — PROGRESS NOTES
ICU Daily Progress Note        Perforated viscus      Assessment/Plan   Acute respiratory failure  Septic shock  DIC, Thrombocytopenia  Anemia  Perforated transverse colon and ischemia of the transverse colon with feculent peritonitis: s/p LAPAROTOMY EXPLORATORY, EXTENDED RIGHT HEMICOLECTOMY AND END ILEOSTOMY  Metabolic acidosis  PEPE  Shock liver        Vent support and management  IVF  PRESSORS  Bicarb drip  ABX: zosyn, micafungin   DVT prophylaxis  PPI        D/w family: critically ill, ct supportive measures, pain contorl but no CPR or cardioversion   LOS: 1 day         Vital signs for last 24 hours:  Vitals:    12/19/21 1023 12/19/21 1045 12/19/21 1208 12/19/21 1213   BP: 112/52  110/47 108/47   Pulse: (!) 130 (!) 129 (!) 127 (!) 127   Resp: 28 27 27   Temp: 100.1 °F (37.8 °C)  100.1 °F (37.8 °C) 100.1 °F (37.8 °C)   TempSrc:   Oral Oral   SpO2:       Weight:       Height:           Intake/Output last 3 shifts:  I/O last 3 completed shifts:  In: 91666 [I.V.:83796; Blood:300; IV Piggyback:2150]  Out: 7060 [Urine:2825; Emesis/NG output:850; Drains:2985; Stool:50; Blood:350]  Intake/Output this shift:  I/O this shift:  In: 281.3 [Blood:281.3]  Out: 170 [Drains:170]    Vent settings for last 24 hours:  FiO2 (%):  [70 %-80 %] 70 %  S RR:  [24] 24  PEEP/CPAP (cm H2O):  [5 cm H20] 5 cm H20  MAP (cm H2O):  [8.8-14] 13    Hemodynamic parameters for last 24 hours:  CVP:  [8 mmHg] 8 mmHg    Radiology  Imaging Results (Last 24 Hours)     Procedure Component Value Units Date/Time    XR Chest 1 View [[id number]] Collected: 12/19/21 1017     Updated: 12/19/21 1023    Narrative:      DATE OF EXAM:  12/19/2021 9:30 AM     PROCEDURE:  XR CHEST 1 VW-     INDICATIONS:  hypoxic respiratory failure; R19.8-Other specified symptoms and signs  involving the digestive system and abdomen; A41.9-Sepsis, unspecified  organism; R65.21-Severe sepsis with septic shock; K56.609-Unspecified  intestinal obstruction, unspecified as to partial  versus complete  obstruction; K63.1-Perforation of intestine (nontraumatic)     COMPARISON:  12/18/2021     TECHNIQUE:   Single radiographic view of the chest was obtained.     FINDINGS:  Endotracheal tube tip is approximately 5 cm above the miles. Right IJ  catheter terminates at the cavoatrial junction. Enteric tube extends  into the left upper quadrant, tip beyond the margins of this exam.   There appears to be a surgical drain in the left upper abdomen, as  before. There is a right basilar chest tube.       There has been interval development of bibasilar opacities, right  greater than left, with right basilar consolidation and possible small  to moderate pleural effusions. No definite pneumothorax is seen. There  also appear to be new diffuse interstitial opacities. Heart size and  mediastinal contour appear unchanged.       Impression:      1.Tubes and lines appear in satisfactory positions as above.  2.Interval development of diffuse interstitial, and right greater than  left bibasilar opacities with possible small to moderate pleural  effusions. Findings could represent pulmonary edema/volume overload with  basilar atelectasis or other infiltrates.     Electronically Signed By-Bladimir Mcclain MD On:12/19/2021 10:21 AM  This report was finalized on [id number] by  Bladimir Mcclain MD.    XR Abdomen KUB [[id number]] Resulted: 12/18/21 1923     Updated: 12/18/21 1935          Labs:  Results from last 7 days   Lab Units 12/19/21  0757   WBC 10*3/mm3 21.90*   HEMOGLOBIN g/dL 5.5*   HEMATOCRIT % 17.8*   PLATELETS 10*3/mm3 24*     Results from last 7 days   Lab Units 12/19/21  0757   SODIUM mmol/L 140   POTASSIUM mmol/L 5.0   CHLORIDE mmol/L 95*   CO2 mmol/L 11.0*   BUN mg/dL 36*   CREATININE mg/dL 1.60*   CALCIUM mg/dL 5.8*   BILIRUBIN mg/dL 2.1*   ALK PHOS U/L 419*   ALT (SGPT) U/L 1,855*   AST (SGOT) U/L >7,000*   GLUCOSE mg/dL 113*     Results from last 7 days   Lab Units 12/19/21  0345   PH, ARTERIAL pH units  "7.406   PO2 ART mm Hg 176.5*   PCO2, ARTERIAL mm Hg 28.2*   HCO3 ART mmol/L 17.7*     Results from last 7 days   Lab Units 12/19/21  0757 12/19/21  0224 12/18/21  1830   ALBUMIN g/dL 2.80* 1.90* 1.80*     Results from last 7 days   Lab Units 12/19/21  0757 12/18/21  0309   TROPONIN T ng/mL 0.218* 0.164*         Results from last 7 days   Lab Units 12/19/21  0757   MAGNESIUM mg/dL 2.0     Results from last 7 days   Lab Units 12/19/21  0757 12/18/21  0846 12/18/21  0425   INR  3.53* 1.37* 1.13*   APTT seconds 109.6* 56.1*  --                Meds:   SCHEDULE  micafungin (MYCAMINE) IV, 150 mg, Intravenous, Q24H  pantoprazole, 40 mg, Intravenous, Q AM  piperacillin-tazobactam, 4.5 g, Intravenous, Q8H      Infusions  custom IV infusion builder, , Last Rate: 250 mL/hr at 12/19/21 1037  DOPamine, 2-20 mcg/kg/min, Last Rate: 20 mcg/kg/min (12/19/21 0930)  EPINEPHrine, 0.02-0.5 mcg/kg/min, Last Rate: 0.35 mcg/kg/min (12/19/21 1149)  midazolam, 1 mg/hr  norepinephrine, 0.02-0.7 mcg/kg/min, Last Rate: 0.55 mcg/kg/min (12/19/21 0650)  phenylephrine, 0.5-6 mcg/kg/min, Last Rate: 6 mcg/kg/min (12/19/21 1038)  sodium chloride, 150 mL/hr, Last Rate: 150 mL/hr (12/19/21 1118)  vasopressin, 0.03 Units/min, Last Rate: 0.03 Units/min (12/19/21 0650)      PRNs  •  acetaminophen **OR** acetaminophen  •  artificial tears  •  HYDROmorphone  •  Morphine **AND** naloxone  •  ondansetron **OR** ondansetron  •  promethazine **OR** promethazine    Physical Exam:  Physical Exam  /47   Pulse (!) 127   Temp 100.1 °F (37.8 °C) (Oral)   Resp 27   Ht 172.7 cm (68\")   Wt 59 kg (130 lb)   SpO2 90%   BMI 19.77 kg/m²     General Appearance:    sedated   Head:    Normocephalic, without obvious abnormality, atraumatic   Eyes:    PERRL,  conuctiva is edematous      Orally intubated            Neck:   Supple, symmetrical, trachea midline, no adenopathy;        thyroid:  No enlargement/tenderness/nodules; no carotid    bruit or JVD   Back:     " Symmetric, no curvature, ROM normal, no CVA tenderness   Lungs:     Crackles to auscultation bilaterally, no wheezing   Chest wall:    No tenderness or deformity   Heart:    Regular tachy and rhythm, S1 and S2 normal, no murmur, rub    or gallop   Abdomen:     Distended, no BS           Extremities:   Extremities are cold, + edema   Pulses:   weak and symmetric all extremities   Skin:   Mottled              ROS  Review of Systems  Unobtainable due to being intubated    Critical Care Time greater than: 45 Minutes  Total time spent with patient greater than: 45 Minutes

## 2021-12-19 NOTE — H&P
Patient Care Team:  Jose D Antunez MD as PCP - General (Family Medicine)  German Goodson MD as Consulting Physician (Gastroenterology)    Chief complaint respiratory failure post surgery        Assessment/Plan   Acute respiratory failure  Septic shock  DIC, Thrombocytopenia  Anemia  Perforated transverse colon and ischemia of the transverse colon with feculent peritonitis: s/p LAPAROTOMY EXPLORATORY, EXTENDED RIGHT HEMICOLECTOMY AND END ILEOSTOMY  Metabolic acidosis  PEPE  Shock liver           Vent support and management  IVF  PRESSORS  Bicarb drip  ABX: zosyn, micafungin   DVT prophylaxis  PPI         D/w family: critically ill, ct supportive measures, pain       History    66-year-old male presents from outside hospital after he had syncopal episode.  Family thought that he had cardiac arrest and he did have brief CPR at home.  At outside hospital patient was found to have free intra-abdominal air.  He received IV fluids antibiotics with Zosyn was started on Levophed and was transferred here for surgical evaluation.  Patient reports he started having abdominal pain yesterday.  Wife reports that his abdomen became significantly swollen.  He reports no vomiting or diarrhea.  He has had recent treatment for C. difficile colitis  Past Medical History:   Diagnosis Date   • Annual physical exam     Impression: doing well, vaccines currrent Age specific anticipatory guidance and warning signs discussed. Diet, exercise, and lifestyle modification discussed. Safety, seatbelts, and routine screening examinations discussed. Discussed self-examinations.   • Annual visit for general adult medical examination with abnormal findings     Impression: doing well, vaccines current Age specific anticipatory guidance and warning signs discussed. Diet, exercise, and lifestyle modification discussed. Safety, seatbelts, and routine screening examinations discussed. Discussed self-examinations.   • C. difficile diarrhea  2019   • Chronic ulcerative proctitis without complications (HCC)    • Colon cancer screening     Impression: colonoscopy current followed by GI   • ED (erectile dysfunction)    • Generalized osteoarthritis     Impression: ice 20 minutes bid nsaids Rehabilitation exercises discussed. Consider imaging if persistent symptoms.   • History of chickenpox    • History of tobacco use    • Hyperlipidemia     Impression: Discussed diet, exercise, lifestyle modification. moderate elevation, he's working hard on diet, exercise, exercising daily improved on red yeast rice at 2400mg daily Follow up recheck ------------- Stable Compliant with meds Is getting adequate diet and exercise Goals developed at last visit were met Care management needs are self addressed.   • Onychomycosis     Impression: Topical care discussed. Call / return if persistent symptoms.   • Overweight (BMI 25.0-29.9)    • Prostatitis, acute    • Screening PSA (prostate specific antigen)     Impression: renuka with mild, symmetric enlargement psa normal   • UC (ulcerative colitis) (HCC)      History reviewed. No pertinent surgical history.  Family History   Problem Relation Age of Onset   • Alzheimer's disease Father    • Dementia Father    • No Known Problems Mother    • No Known Problems Sister    • No Known Problems Brother    • No Known Problems Daughter    • No Known Problems Sister    • No Known Problems Brother    • No Known Problems Brother      Social History     Tobacco Use   • Smoking status: Former Smoker     Packs/day: 2.50     Years: 21.00     Pack years: 52.50     Types: Cigarettes     Start date:      Quit date:      Years since quittin.9   • Smokeless tobacco: Never Used   Vaping Use   • Vaping Use: Never used   Substance Use Topics   • Alcohol use: Yes     Comment: Drinks wine, Occasional alcohol use.   • Drug use: No     Medications Prior to Admission   Medication Sig Dispense Refill Last Dose   • azaTHIOprine (IMURAN) 75 MG  tablet Take 1 tablet by mouth Daily. 30 tablet 5    • colesevelam (Welchol) 625 MG tablet Take 1,875 mg by mouth 2 (Two) Times a Day With Meals.      • folic acid (FOLVITE) 800 MCG tablet Take 800 mcg by mouth Daily.      • gabapentin (NEURONTIN) 300 MG capsule Take 1 capsule by mouth 3 (Three) Times a Day. 90 capsule 2    • mesalamine (LIALDA) 1.2 g EC tablet Take 4,800 mg by mouth Daily.      • omeprazole (priLOSEC) 40 MG capsule Take 1 capsule by mouth Daily. 30 capsule 2    • predniSONE (DELTASONE) 10 MG tablet Take 1 tablet by mouth Daily. 40 mg QD for 3 days, than 30 mg po once QD for 3 days, than 20 mg QD for 3 days, than 10 mg QD for 3 days. 30 tablet 0    • saccharomyces boulardii (Florastor) 250 MG capsule Take 1 capsule by mouth 2 (Two) Times a Day. 28 capsule 0    • vardenafil (Levitra) 20 MG tablet Take 20 mg by mouth Daily As Needed for Erectile Dysfunction. 1 hour prior to sexual activity        Allergies:  Patient has no known allergies.      Perforated viscus      Past Medical History:   Diagnosis Date   • Annual physical exam     Impression: doing well, vaccines currrent Age specific anticipatory guidance and warning signs discussed. Diet, exercise, and lifestyle modification discussed. Safety, seatbelts, and routine screening examinations discussed. Discussed self-examinations.   • Annual visit for general adult medical examination with abnormal findings     Impression: doing well, vaccines current Age specific anticipatory guidance and warning signs discussed. Diet, exercise, and lifestyle modification discussed. Safety, seatbelts, and routine screening examinations discussed. Discussed self-examinations.   • C. difficile diarrhea 12/18/2019   • Chronic ulcerative proctitis without complications (HCC)    • Colon cancer screening     Impression: colonoscopy current followed by GI   • ED (erectile dysfunction)    • Generalized osteoarthritis     Impression: ice 20 minutes bid nsaids Rehabilitation  exercises discussed. Consider imaging if persistent symptoms.   • History of chickenpox    • History of tobacco use    • Hyperlipidemia     Impression: Discussed diet, exercise, lifestyle modification. moderate elevation, he's working hard on diet, exercise, exercising daily improved on red yeast rice at 2400mg daily Follow up recheck ------------- Stable Compliant with meds Is getting adequate diet and exercise Goals developed at last visit were met Care management needs are self addressed.   • Onychomycosis     Impression: Topical care discussed. Call / return if persistent symptoms.   • Overweight (BMI 25.0-29.9)    • Prostatitis, acute    • Screening PSA (prostate specific antigen)     Impression: renuka with mild, symmetric enlargement psa normal   • UC (ulcerative colitis) (HCC)        History reviewed. No pertinent surgical history.    Family History   Problem Relation Age of Onset   • Alzheimer's disease Father    • Dementia Father    • No Known Problems Mother    • No Known Problems Sister    • No Known Problems Brother    • No Known Problems Daughter    • No Known Problems Sister    • No Known Problems Brother    • No Known Problems Brother        Social History     Socioeconomic History   • Marital status:    Tobacco Use   • Smoking status: Former Smoker     Packs/day: 2.50     Years: 21.00     Pack years: 52.50     Types: Cigarettes     Start date:      Quit date:      Years since quittin.9   • Smokeless tobacco: Never Used   Vaping Use   • Vaping Use: Never used   Substance and Sexual Activity   • Alcohol use: Yes     Comment: Drinks wine, Occasional alcohol use.   • Drug use: No   • Sexual activity: Defer       Review of Systems  Review of Systems     Vital Signs  Temp:  [96.4 °F (35.8 °C)-100.1 °F (37.8 °C)] 100.1 °F (37.8 °C)  Heart Rate:  [119-133] 127  Resp:  [25-31] 27  BP: (100-112)/(47-54) 108/47  Arterial Line BP: ()/(39-68) 109/60  FiO2 (%):  [70 %-80 %] 70 %    Physical  Exam:  Physical Exam  Sedated, intubated  Heart: sinus tache, no murmur  Lungs: few crackles  Abd: stiff, no BS, distended  Ext: cold and mottled  Radiology  Imaging Results (Last 24 Hours)     Procedure Component Value Units Date/Time    XR Chest 1 View [[id number]] Collected: 12/19/21 1017     Updated: 12/19/21 1023    Narrative:      DATE OF EXAM:  12/19/2021 9:30 AM     PROCEDURE:  XR CHEST 1 VW-     INDICATIONS:  hypoxic respiratory failure; R19.8-Other specified symptoms and signs  involving the digestive system and abdomen; A41.9-Sepsis, unspecified  organism; R65.21-Severe sepsis with septic shock; K56.609-Unspecified  intestinal obstruction, unspecified as to partial versus complete  obstruction; K63.1-Perforation of intestine (nontraumatic)     COMPARISON:  12/18/2021     TECHNIQUE:   Single radiographic view of the chest was obtained.     FINDINGS:  Endotracheal tube tip is approximately 5 cm above the miles. Right IJ  catheter terminates at the cavoatrial junction. Enteric tube extends  into the left upper quadrant, tip beyond the margins of this exam.   There appears to be a surgical drain in the left upper abdomen, as  before. There is a right basilar chest tube.       There has been interval development of bibasilar opacities, right  greater than left, with right basilar consolidation and possible small  to moderate pleural effusions. No definite pneumothorax is seen. There  also appear to be new diffuse interstitial opacities. Heart size and  mediastinal contour appear unchanged.       Impression:      1.Tubes and lines appear in satisfactory positions as above.  2.Interval development of diffuse interstitial, and right greater than  left bibasilar opacities with possible small to moderate pleural  effusions. Findings could represent pulmonary edema/volume overload with  basilar atelectasis or other infiltrates.     Electronically Signed By-Bladimir Mcclain MD On:12/19/2021 10:21 AM  This report was  finalized on [id number] by  Bladimir Mcclain MD.    XR Abdomen KUB [[id number]] Resulted: 12/18/21 1923     Updated: 12/18/21 1935          Labs:  Results from last 7 days   Lab Units 12/19/21  0757   WBC 10*3/mm3 21.90*   HEMOGLOBIN g/dL 5.5*   HEMATOCRIT % 17.8*   PLATELETS 10*3/mm3 24*     Results from last 7 days   Lab Units 12/19/21  0757   SODIUM mmol/L 140   POTASSIUM mmol/L 5.0   CHLORIDE mmol/L 95*   CO2 mmol/L 11.0*   BUN mg/dL 36*   CREATININE mg/dL 1.60*   CALCIUM mg/dL 5.8*   BILIRUBIN mg/dL 2.1*   ALK PHOS U/L 419*   ALT (SGPT) U/L 1,855*   AST (SGOT) U/L >7,000*   GLUCOSE mg/dL 113*     Results from last 7 days   Lab Units 12/19/21  0345   PH, ARTERIAL pH units 7.406   PO2 ART mm Hg 176.5*   PCO2, ARTERIAL mm Hg 28.2*   HCO3 ART mmol/L 17.7*     Results from last 7 days   Lab Units 12/19/21  0757 12/19/21  0224 12/18/21  1830   ALBUMIN g/dL 2.80* 1.90* 1.80*     Results from last 7 days   Lab Units 12/19/21  1302 12/19/21  0757 12/18/21  0309   TROPONIN T ng/mL 0.322* 0.218* 0.164*         Results from last 7 days   Lab Units 12/19/21  0757   MAGNESIUM mg/dL 2.0     Results from last 7 days   Lab Units 12/19/21  0757 12/18/21  0846 12/18/21  0425   INR  3.53* 1.37* 1.13*   APTT seconds 109.6* 56.1*  --                Meds:   SCHEDULE  micafungin (MYCAMINE) IV, 150 mg, Intravenous, Q24H  pantoprazole, 40 mg, Intravenous, Q AM  piperacillin-tazobactam, 4.5 g, Intravenous, Q8H      Infusions  custom IV infusion builder, , Last Rate: 250 mL/hr at 12/19/21 1037  dextrose 5 % and sodium chloride 0.9 %, 150 mL/hr, Last Rate: 150 mL/hr (12/19/21 1400)  DOPamine, 2-20 mcg/kg/min, Last Rate: 20 mcg/kg/min (12/19/21 0930)  EPINEPHrine, 0.02-0.5 mcg/kg/min, Last Rate: 0.35 mcg/kg/min (12/19/21 1149)  midazolam, 1 mg/hr, Last Rate: 1 mg/hr (12/19/21 1402)  norepinephrine, 0.02-0.7 mcg/kg/min, Last Rate: 0.55 mcg/kg/min (12/19/21 0650)  phenylephrine, 0.5-6 mcg/kg/min, Last Rate: 6 mcg/kg/min (12/19/21  1038)  vasopressin, 0.03 Units/min, Last Rate: 0.03 Units/min (12/19/21 0650)      PRNs  •  acetaminophen **OR** acetaminophen  •  artificial tears  •  HYDROmorphone  •  Morphine **AND** naloxone  •  ondansetron **OR** ondansetron  •  promethazine **OR** promethazine      I discussed the patients findings and my recommendations with family and nursing staff.     Toy Em MD  12/19/21  14:06 EST    Time: Critical care 45 min

## 2021-12-19 NOTE — CONSULTS
Hematology/Oncology Inpatient Consultation    Patient name: Gabriel Sandhu  : 1955  MRN: [id number]  Referring Provider: Toy Em MD  Reason for Consultation: Disseminated intravascular coagulation    Chief complaint: Perforated viscus    History of present illness:    Gabriel Sandhu is a 66 y.o. male who presented to [facility] on 2021 with sepsis, intra-abdominal air needing emergent surgical intervention.    Patient has history of ulcerative colitis, recent history of C. difficile colitis(discharged on 2021_. Patient presented on 2021 with findings of peritonitis.  X-ray abdomen with air under the diaphragm.    2021 -exploratory laparotomy, extended right hemicolectomy and end ileostomy. Patient remained on ventilator with maximum pressor support.    21  Hematology/Oncology was consulted with possibility of DIC. CBC with WBC 21.9, hemoglobin 5.5, hematocrit 17.8, platelet 24. This was postop. D-dimer 10.28, thrombin time 35.7, APTT 109.6, fibrinogen 70    He/She  has a past medical history of Annual physical exam, Annual visit for general adult medical examination with abnormal findings, C. difficile diarrhea (2019), Chronic ulcerative proctitis without complications (HCC), Colon cancer screening, ED (erectile dysfunction), Generalized osteoarthritis, History of chickenpox, History of tobacco use, Hyperlipidemia, Onychomycosis, Overweight (BMI 25.0-29.9), Prostatitis, acute, Screening PSA (prostate specific antigen), and UC (ulcerative colitis) (HCC).    PCP: Jose D Antunez MD    History:  Past Medical History:   Diagnosis Date   • Annual physical exam     Impression: doing well, vaccines currrent Age specific anticipatory guidance and warning signs discussed. Diet, exercise, and lifestyle modification discussed. Safety, seatbelts, and routine screening examinations discussed. Discussed self-examinations.   • Annual visit for general adult medical  examination with abnormal findings     Impression: doing well, vaccines current Age specific anticipatory guidance and warning signs discussed. Diet, exercise, and lifestyle modification discussed. Safety, seatbelts, and routine screening examinations discussed. Discussed self-examinations.   • C. 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 Consider imaging if persistent symptoms.   • History of chickenpox    • History of tobacco use    • Hyperlipidemia     Impression: Discussed diet, exercise, lifestyle modification. moderate elevation, he's working hard on diet, exercise, exercising daily improved on red yeast rice at 2400mg daily Follow up recheck ------------- Stable Compliant with meds Is getting adequate diet and exercise Goals developed at last visit were met Care management needs are self addressed.   • Onychomycosis     Impression: Topical care discussed. Call / return if persistent symptoms.   • Overweight (BMI 25.0-29.9)    • Prostatitis, acute    • Screening PSA (prostate specific antigen)     Impression: renuka with mild, symmetric enlargement psa normal   • UC (ulcerative colitis) (HCC)    , History reviewed. No pertinent surgical history.,   Family History   Problem Relation Age of Onset   • Alzheimer's disease Father    • Dementia Father    • No Known Problems Mother    • No Known Problems Sister    • No Known Problems Brother    • No Known Problems Daughter    • No Known Problems Sister    • No Known Problems Brother    • No Known Problems Brother    ,   Social History     Tobacco Use   • Smoking status: Former Smoker     Packs/day: 2.50     Years: 21.00     Pack years: 52.50     Types: Cigarettes     Start date: 1969     Quit date: 1990      Years since quittin.9   • Smokeless tobacco: Never Used   Vaping Use   • Vaping Use: Never used   Substance Use Topics   • Alcohol use: Yes     Comment: Drinks wine, Occasional alcohol use.   • Drug use: No   ,   Medications Prior to Admission   Medication Sig Dispense Refill Last Dose   • azaTHIOprine (IMURAN) 75 MG tablet Take 1 tablet by mouth Daily. 30 tablet 5    • colesevelam (Welchol) 625 MG tablet Take 1,875 mg by mouth 2 (Two) Times a Day With Meals.      • folic acid (FOLVITE) 800 MCG tablet Take 800 mcg by mouth Daily.      • gabapentin (NEURONTIN) 300 MG capsule Take 1 capsule by mouth 3 (Three) Times a Day. 90 capsule 2    • mesalamine (LIALDA) 1.2 g EC tablet Take 4,800 mg by mouth Daily.      • omeprazole (priLOSEC) 40 MG capsule Take 1 capsule by mouth Daily. 30 capsule 2    • predniSONE (DELTASONE) 10 MG tablet Take 1 tablet by mouth Daily. 40 mg QD for 3 days, than 30 mg po once QD for 3 days, than 20 mg QD for 3 days, than 10 mg QD for 3 days. 30 tablet 0    • saccharomyces boulardii (Florastor) 250 MG capsule Take 1 capsule by mouth 2 (Two) Times a Day. 28 capsule 0    • vardenafil (Levitra) 20 MG tablet Take 20 mg by mouth Daily As Needed for Erectile Dysfunction. 1 hour prior to sexual activity      , Scheduled Meds:  micafungin (MYCAMINE) IV, 150 mg, Intravenous, Q24H  pantoprazole, 40 mg, Intravenous, Q AM  piperacillin-tazobactam, 4.5 g, Intravenous, Q8H    , Continuous Infusions:  custom IV infusion builder, , Last Rate: 250 mL/hr at 21 1037  DOPamine, 2-20 mcg/kg/min, Last Rate: 20 mcg/kg/min (21 0930)  EPINEPHrine, 0.02-0.5 mcg/kg/min, Last Rate: 0.35 mcg/kg/min (21 1149)  norepinephrine, 0.02-0.7 mcg/kg/min, Last Rate: 0.55 mcg/kg/min (21 0650)  phenylephrine, 0.5-6 mcg/kg/min, Last Rate: 6 mcg/kg/min (21 1038)  sodium chloride, 150 mL/hr, Last Rate: 150 mL/hr (21 1118)  vasopressin, 0.03 Units/min, Last Rate: 0.03 Units/min (21 0650)    ,  "PRN Meds:  •  acetaminophen **OR** acetaminophen  •  artificial tears  •  HYDROmorphone  •  Morphine **AND** naloxone  •  ondansetron **OR** ondansetron  •  promethazine **OR** promethazine   Allergies:  Patient has no known allergies.    Subjective     ROS:  Review of Systems   Unable to perform ROS: Intubated        Objective   Vital Signs:   /47   Pulse (!) 127   Temp 100.1 °F (37.8 °C) (Oral)   Resp 27   Ht 172.7 cm (68\")   Wt 59 kg (130 lb)   SpO2 90%   BMI 19.77 kg/m²     Physical Exam: (performed by MD)  Physical Exam  Constitutional:       Comments: Intubated on ventilator  Acral cyanosis   HENT:      Head: Normocephalic and atraumatic.   Cardiovascular:      Rate and Rhythm: Regular rhythm. Tachycardia present.      Heart sounds: Normal heart sounds. No murmur heard.      Pulmonary:      Comments: Intubated on ventilator  Abdominal:      General: There is no distension.      Palpations: Abdomen is soft. There is no mass.      Tenderness: There is no abdominal tenderness.   Skin:     Findings: No bruising or rash.      Comments: Cold clammy   Neurological:      Comments: Sedated         Results Review:  Lab Results   Lab 12/19/21  0757 12/19/21  0942   INR 3.53*  --    APTT 109.6*  --    FIBRINOGEN  --  70*   PLATELETS 24*  --    D DIMER QUANT  --  10.28*           Imaging Reviewed:   XR Chest 1 View    Result Date: 12/19/2021  1.Tubes and lines appear in satisfactory positions as above. 2.Interval development of diffuse interstitial, and right greater than left bibasilar opacities with possible small to moderate pleural effusions. Findings could represent pulmonary edema/volume overload with basilar atelectasis or other infiltrates.  Electronically Signed By-Bladimir Mcclain MD On:12/19/2021 10:21 AM This report was finalized on [id number] by  Bladimir Mcclain MD.    XR Chest 1 View    Result Date: 12/18/2021   1. Large abnormal amount of free intraperitoneal gas lying between the right lobe the " liver in the right hemidiaphragm of uncertain etiology might be caused by recent surgical procedure or perforated hollow viscus. CT the abdomen pelvis reveal the further evaluate this finding as well as clinical correlation physical examination. 2. Proximal tip of the right internal jugular central venous line catheter projects over the junction superior vena cava and right atrium. No evidence of pneumothorax or pleural effusion or mediastinal widening. No acute disease in the chest.  Electronically Signed By-Jaime Garcia MD On:12/18/2021 8:32 AM This report was finalized on [id number] by  Jaime Garcia MD.    XR Chest 1 View    Result Date: 12/18/2021   1. Large abnormal amount of free intraperitoneal gas lying between the right lobe the liver in the right hemidiaphragm of uncertain etiology might be caused by recent surgical procedure or perforated hollow viscus. CT the abdomen pelvis reveal the further evaluate this finding as well as clinical correlation physical examination. 2. Proximal tip of the right internal jugular central venous line catheter projects over the junction superior vena cava and right atrium. No evidence of pneumothorax or pleural effusion or mediastinal widening. No acute disease in the chest.  Electronically Signed By-Jaime Garcia MD On:12/18/2021 8:32 AM This report was finalized on [id number] by  Jaime Garcia MD.    XR Abdomen KUB    Result Date: 12/18/2021   1. Large amount of free intraperitoneal gas in the right upper quadrant the abdomen consistent with abdominal surgery today. 2. Nasogastric tube courses down the esophagus and stomach below diaphragm.  Electronically Signed By-Jaime Garcia MD On:12/18/2021 8:35 AM This report was finalized on [id number] by  Jaime Garcia MD.           Assessment/Plan     Patient is a six 6-year-[age] male with ulcerative colitis, C. difficile colitis no admitted with acute abdomen, emergent  exploratory laparotomy with peritonitis, colon resection unable to be extubated postop with septic shock, severe DIC    Disseminated intravascular coagulation  Patient has elevated PT PTT, elevated D-dimer low fibrinogen of 70 this goes along with DIC.  With fibrinogen being 70 I would recommend getting cryoprecipitate to target above 100.  We will give 1 unit now recheck fibrinogen possibly give more unit overnight. He will need 3 to 4 units to bring his levels to the required range.  Patient has a DIC can be procoagulant as well hence will be careful with transfusion to prevent venous thromboembolism.    Thrombocytopenia  With recent surgery recommend platelet goal of 50 and above  Will be given 2 units today repeat CBC after. Transfuse as needed.    Severe anemia  Due to blood loss with recent surgery.  Transfuse for target hemoglobin above 7.    Septic shock  Related to peritonitis  Patient on 4 pressors   Multiorgan failure with shock liver, creatinine at 1.6 developing renal failure.  Poor prognosis discussed with family.  Family requests DNR with full treatment.      Electronically signed by Ivon Pinedo MD, 12/19/21, 12:44 PM EST.  Thank you for this consult. We will be happy to follow along with you.

## 2021-12-20 NOTE — PLAN OF CARE
Goal Outcome Evaluation:              Outcome Summary: Patient remains intubated, on five pressors, sodium bicarb gtt, along with a dextrose gtt. Patient is not responsive. Family at bedside. Patient made a DNR. Will continue to monitor.

## 2021-12-20 NOTE — NURSING NOTE
Pt is maxed on 5 pressors - Epi, Levo, Bruce, Vaso, and Dopamine; also on a Bicard and D5 NS drip. Pt received 1 bag of cryo and 1 unit of platelets. Pt still unresponsive: no cough or gag reflex, pupils are pinpoint and non responsive; [facility]RA was contacted, wife discussed multiple times that she wants to speak to someone today about outcomes of treatment and possible comfort care.

## 2021-12-20 NOTE — PROGRESS NOTES
"PULMONARY CRITICAL CARE Progress  NOTE      PATIENT IDENTIFICATION:  Name: Gabriel Sandhu  MRN: [id number]  :  1955     Age: 66 y.o.  Sex: male    DATE OF Note:  2021   Referring Physician: Toy Em MD                  Subjective:   On vent no response no gag    No vomiting,  Out put on colostomy      Objective:  tMax 24 hrs: Temp (24hrs), Av °F (38.3 °C), Min:100.1 °F (37.8 °C), Max:102.1 °F (38.9 °C)      Vitals Ranges:   Temp:  [100.1 °F (37.8 °C)-102.1 °F (38.9 °C)] 100.2 °F (37.9 °C)  Heart Rate:  [125-130] 127  Resp:  [20-31] 24  BP: ()/(35-52) 84/35  Arterial Line BP: ()/(32-52) 74/32  FiO2 (%):  [70 %-100 %] 100 %    Intake and Output Last 3 Shifts:   I/O last 3 completed shifts:  In: 03291.5 [I.V.:75633; Blood:1448.5; IV Piggyback:50]  Out: 4695 [Urine:385; Emesis/NG output:1370; Drains:2640; Stool:300]    Exam:  BP (!) 84/35   Pulse (!) 127   Temp 100.2 °F (37.9 °C) (Axillary)   Resp 24   Ht 172.7 cm (68\")   Wt 59 kg (130 lb)   SpO2 (!) 89%   BMI 19.77 kg/m²     General Appearance:   On vent unresponsive  HEENT:  Normocephalic, without obvious abnormality, Conjunctiva/corneas clear,.  Normal external ear canals, Nares normal, no drainage     Neck:  Supple, symmetrical, trachea midline. No JVD.  Lungs /Chest wall:   Bilateral basal rhonchi, respirations unlabored symmetrical wall movement.     Heart:  Regular rate and rhythm, systolic murmur PMI left sternal border  Abdomen: Soft, non-tender, no masses, no organomegaly. Drainage in place, packed wound   , colostomy bag  Extremities: ++ edema no clubbing. extremities Cyanosis        Medications:    Current Facility-Administered Medications:   •  acetaminophen (TYLENOL) tablet 650 mg, 650 mg, Oral, Q4H PRN **OR** acetaminophen (TYLENOL) suppository 650 mg, 650 mg, Rectal, Q4H PRN, Donna De La Torre MD  •  artificial tears ophthalmic ointment, , Both Eyes, Q1H PRN, Elba Marinelli APRN, Given at 21 0324  •  " dextrose 5 % 850 mL with sodium bicarbonate 8.4 % 150 mEq infusion, , Intravenous, Continuous, Day, Leila, APRN, Last Rate: 250 mL/hr at 12/20/21 0836, New Bag at 12/20/21 0836  •  dextrose 5 % and sodium chloride 0.9 % infusion, 150 mL/hr, Intravenous, Continuous, Toy Em MD, Last Rate: 150 mL/hr at 12/20/21 0311, 150 mL/hr at 12/20/21 0311  •  DOPamine 400 mg in 250 mL D5W infusion, 2-20 mcg/kg/min, Intravenous, Titrated, Day, Leila, APRN, Last Rate: 44.3 mL/hr at 12/20/21 0803, 20 mcg/kg/min at 12/20/21 0803  •  EPINEPHrine (ADRENALIN) 10 mg in sodium chloride 0.9 % 250 mL infusion, 0.02-0.5 mcg/kg/min, Intravenous, Titrated, Day, Leila, APRN, Last Rate: 44.3 mL/hr at 12/20/21 0832, 0.5 mcg/kg/min at 12/20/21 0832  •  HYDROmorphone (DILAUDID) injection 2 mg, 2 mg, Intravenous, Q1H PRN, Day, Leila, APRN  •  micafungin sodium (MYCAMINE) 150 mg in sodium chloride 0.9 % 100 mL IVPB, 150 mg, Intravenous, Q24H, Day, Leila, APRN, 150 mg at 12/19/21 0921  •  midazolam (VERSED) 100 mg in 100mL NS infusion, 1 mg/hr, Intravenous, Titrated, Day, Leila, APRN, Stopped at 12/20/21 0717  •  Morphine sulfate (PF) injection 4 mg, 4 mg, Intravenous, Q1H PRN, 4 mg at 12/19/21 1253 **AND** naloxone (NARCAN) injection 0.4 mg, 0.4 mg, Intravenous, Q5 Min PRN, Elba Marinelli, APRRODO  •  norepinephrine (LEVOPHED) 16 mg in 250 mL NS infusion, 0.02-0.7 mcg/kg/min, Intravenous, Titrated, Day, Leila, APRN, Last Rate: 38.7 mL/hr at 12/20/21 0849, 0.7 mcg/kg/min at 12/20/21 0849  •  ondansetron (ZOFRAN) tablet 4 mg, 4 mg, Oral, Q6H PRN **OR** ondansetron (ZOFRAN) injection 4 mg, 4 mg, Intravenous, Q6H PRN, Donna De La Torre MD  •  pantoprazole (PROTONIX) injection 40 mg, 40 mg, Intravenous, Q AM, Day, Leila, APRN, 40 mg at 12/20/21 0530  •  phenylephrine (FILI-SYNEPHRINE) 100 mg in 250 mL NS infusion, 0.5-6 mcg/kg/min, Intravenous, Titrated, Day, Leila, APRN, Last Rate: 53.1 mL/hr at 12/20/21 0726, 6 mcg/kg/min at 12/20/21 0726  •   piperacillin-tazobactam (ZOSYN) IVPB 4.5 g in 100 mL NS (CD), 4.5 g, Intravenous, Q8H, Toy Em MD, Last Rate: 25 mL/hr at 12/20/21 0327, 4.5 g at 12/20/21 0327  •  promethazine (PHENERGAN) tablet 12.5 mg, 12.5 mg, Oral, Q6H PRN **OR** promethazine (PHENERGAN) suppository 12.5 mg, 12.5 mg, Rectal, Q6H PRN, Donna De La Torre MD  •  Vasopressin (PITRESSIN) 20 Units in sodium chloride 0.9 % 100 mL infusion, 0.03 Units/min, Intravenous, Continuous, Christos Diehl MD, Last Rate: 9 mL/hr at 12/20/21 0830, 0.03 Units/min at 12/20/21 0830    Data Review:  All labs (24hrs):   Recent Results (from the past 24 hour(s))   Fibrinogen    Collection Time: 12/19/21  9:42 AM    Specimen: Blood   Result Value Ref Range    Fibrinogen 70 (C) 210 - 450 mg/dL   D-dimer, Quantitative    Collection Time: 12/19/21  9:42 AM    Specimen: Blood   Result Value Ref Range    D-Dimer, Quantitative 10.28 (H) 0.00 - 0.59 mg/L (FEU)   Peripheral Blood Smear    Collection Time: 12/19/21  9:42 AM    Specimen: Blood   Result Value Ref Range    Pathology Review Yes    Lactate Dehydrogenase    Collection Time: 12/19/21  9:42 AM    Specimen: Blood   Result Value Ref Range    LDH >2,500 (H) 135 - 225 U/L   Lactic Acid, Plasma    Collection Time: 12/19/21  1:02 PM    Specimen: Blood   Result Value Ref Range    Lactate 20.7 (C) 0.5 - 2.0 mmol/L   Troponin    Collection Time: 12/19/21  1:02 PM    Specimen: Blood   Result Value Ref Range    Troponin T 0.322 (C) 0.000 - 0.030 ng/mL   POC Glucose Once    Collection Time: 12/19/21  1:04 PM    Specimen: Blood   Result Value Ref Range    Glucose 77 70 - 105 mg/dL   Prepare Cryoprecipitate 5 Pack, 4 Units    Collection Time: 12/19/21  2:55 PM   Result Value Ref Range    Product Code Y3596N42     Unit Number [id number]     UNIT  ABO O     UNIT  RH POS     Dispense Status IS     Blood Expiration Date 473995196866     Blood Type Barcode 5100    Lactic Acid, Plasma    Collection Time: 12/19/21  5:39 PM     Specimen: Blood   Result Value Ref Range    Lactate 20.1 (C) 0.5 - 2.0 mmol/L   Troponin    Collection Time: 12/19/21  5:39 PM    Specimen: Blood   Result Value Ref Range    Troponin T 0.337 (C) 0.000 - 0.030 ng/mL   Fibrinogen    Collection Time: 12/19/21  5:39 PM    Specimen: Blood   Result Value Ref Range    Fibrinogen 96 (L) 210 - 450 mg/dL   POC Glucose Once    Collection Time: 12/19/21  5:42 PM    Specimen: Blood   Result Value Ref Range    Glucose 109 (H) 70 - 105 mg/dL   Prepare Cryoprecipitate 5 Pack, 1 Units    Collection Time: 12/19/21  9:17 PM   Result Value Ref Range    Product Code R2458D13     Unit Number [id number]     UNIT  ABO O     UNIT  RH POS     Dispense Status IS     Blood Expiration Date 202112200057     Blood Type Barcode 5100    Lactic Acid, Plasma    Collection Time: 12/19/21 11:13 PM    Specimen: Blood   Result Value Ref Range    Lactate 19.5 (C) 0.5 - 2.0 mmol/L   Troponin    Collection Time: 12/19/21 11:13 PM    Specimen: Blood   Result Value Ref Range    Troponin T 0.318 (C) 0.000 - 0.030 ng/mL   CBC Auto Differential    Collection Time: 12/19/21 11:13 PM    Specimen: Blood   Result Value Ref Range    WBC 38.30 (C) 3.40 - 10.80 10*3/mm3    RBC 2.69 (L) 4.14 - 5.80 10*6/mm3    Hemoglobin 7.7 (L) 13.0 - 17.7 g/dL    Hematocrit 23.9 (L) 37.5 - 51.0 %    MCV 89.0 79.0 - 97.0 fL    MCH 28.6 26.6 - 33.0 pg    MCHC 32.1 31.5 - 35.7 g/dL    RDW 16.5 (H) 12.3 - 15.4 %    RDW-SD 52.1 37.0 - 54.0 fl    MPV 9.3 6.0 - 12.0 fL    Platelets 23 (C) 140 - 450 10*3/mm3   Scan Slide    Collection Time: 12/19/21 11:13 PM    Specimen: Blood   Result Value Ref Range    Scan Slide     Manual Differential    Collection Time: 12/19/21 11:13 PM    Specimen: Blood   Result Value Ref Range    Neutrophil % 44.0 42.7 - 76.0 %    Lymphocyte % 7.0 (L) 19.6 - 45.3 %    Bands %  43.0 (H) 0.0 - 5.0 %    Metamyelocyte % 3.0 (H) 0.0 - 0.0 %    Myelocyte % 3.0 (H) 0.0 - 0.0 %    Neutrophils Absolute 33.32 (H) 1.70 -  7.00 10*3/mm3    Lymphocytes Absolute 2.68 0.70 - 3.10 10*3/mm3    nRBC 10.0 (H) 0.0 - 0.2 /100 WBC    Anisocytosis Slight/1+ None Seen    [place] Cells Slight/1+ None Seen    Toxic Granulation Mod/2+ None Seen    Vacuolated Neutrophils Slight/1+ None Seen    Platelet Estimate Decreased Normal   POC Glucose Once    Collection Time: 12/20/21 12:01 AM    Specimen: Blood   Result Value Ref Range    Glucose 132 (H) 70 - 105 mg/dL   Prepare RBC, 1 Units    Collection Time: 12/20/21  2:00 AM   Result Value Ref Range    Product Code B0226R88     Unit Number [id number]     UNIT  ABO O     UNIT  RH NEG     Crossmatch Interpretation Compatible     Dispense Status PT     Blood Expiration Date 202112302359     Blood Type Barcode 9500    Prepare RBC, 2 Units    Collection Time: 12/20/21  2:00 AM   Result Value Ref Range    Product Code F6042G59     Unit Number [id number]     UNIT  ABO O     UNIT  RH POS     Crossmatch Interpretation Compatible     Dispense Status PT     Blood Expiration Date 202201042359     Blood Type Barcode 5100     Product Code G1621H41     Unit Number [id number]     UNIT  ABO O     UNIT  RH POS     Crossmatch Interpretation Compatible     Dispense Status IS     Blood Expiration Date 202201042359     Blood Type Barcode 5100    Prepare Platelet Pheresis, 1 Units    Collection Time: 12/20/21  2:00 AM   Result Value Ref Range    Product Code R8700W92     Unit Number [id number]     UNIT  ABO A     UNIT  RH POS     Dispense Status PT     Blood Expiration Date 202112202359     Blood Type Barcode 6200    Prepare Platelet Pheresis, 1 Units    Collection Time: 12/20/21  2:10 AM   Result Value Ref Range    Product Code A4262G91     Unit Number [id number]     UNIT  ABO A     UNIT  RH POS     Dispense Status IS     Blood Expiration Date 202112222359     Blood Type Barcode 6200    Blood Gas, Arterial -    Collection Time: 12/20/21  4:10 AM    Specimen: Arterial Blood   Result Value Ref Range    Site  Arterial Line     Tobias's Test N/A     pH, Arterial 7.335 (L) 7.350 - 7.450 pH units    pCO2, Arterial 32.0 (L) 35.0 - 48.0 mm Hg    pO2, Arterial 140.4 (H) 83.0 - 108.0 mm Hg    HCO3, Arterial 17.1 (L) 21.0 - 28.0 mmol/L    Base Excess, Arterial -8.0 (L) 0.0 - 3.0 mmol/L    O2 Saturation, Arterial 99.0 (H) 94.0 - 98.0 %    CO2 Content 18.1 (L) 22 - 29 mmol/L    Barometric Pressure for Blood Gas      Modality Adult Vent     FIO2 100 %    Ventilator Mode ;AC     Set Tidal Volume 500     PEEP 5     Hemodilution No     Respiratory Rate 24    Comprehensive Metabolic Panel    Collection Time: 12/20/21  4:54 AM    Specimen: Blood   Result Value Ref Range    Glucose 138 (H) 65 - 99 mg/dL    BUN 37 (H) 8 - 23 mg/dL    Creatinine 2.30 (H) 0.76 - 1.27 mg/dL    Sodium 140 136 - 145 mmol/L    Potassium 4.8 3.5 - 5.2 mmol/L    Chloride 94 (L) 98 - 107 mmol/L    CO2 16.0 (L) 22.0 - 29.0 mmol/L    Calcium 4.8 (C) 8.6 - 10.5 mg/dL    Total Protein 2.5 (L) 6.0 - 8.5 g/dL    Albumin 1.70 (L) 3.50 - 5.20 g/dL    ALT (SGPT) 1,305 (H) 1 - 41 U/L    AST (SGOT) 6,065 (H) 1 - 40 U/L    Alkaline Phosphatase 617 (H) 39 - 117 U/L    Total Bilirubin 2.9 (H) 0.0 - 1.2 mg/dL    eGFR Non African Amer 29 (L) >60 mL/min/1.73    Globulin 0.8 gm/dL    A/G Ratio 2.1 g/dL    BUN/Creatinine Ratio 16.1 7.0 - 25.0    Anion Gap 30.0 (H) 5.0 - 15.0 mmol/L   Lactic Acid, Plasma    Collection Time: 12/20/21  4:54 AM    Specimen: Blood   Result Value Ref Range    Lactate 19.9 (C) 0.5 - 2.0 mmol/L   Calcium, Ionized    Collection Time: 12/20/21  4:54 AM    Specimen: Blood   Result Value Ref Range    Ionized Calcium 0.66 (L) 1.20 - 1.30 mmol/L   Fibrinogen    Collection Time: 12/20/21  4:54 AM    Specimen: Blood   Result Value Ref Range    Fibrinogen 113 (L) 210 - 450 mg/dL   CBC Auto Differential    Collection Time: 12/20/21  4:54 AM    Specimen: Blood   Result Value Ref Range    WBC 38.90 (C) 3.40 - 10.80 10*3/mm3    RBC 2.58 (L) 4.14 - 5.80 10*6/mm3     Hemoglobin 7.4 (L) 13.0 - 17.7 g/dL    Hematocrit 23.0 (L) 37.5 - 51.0 %    MCV 89.1 79.0 - 97.0 fL    MCH 28.8 26.6 - 33.0 pg    MCHC 32.3 31.5 - 35.7 g/dL    RDW 16.7 (H) 12.3 - 15.4 %    RDW-SD 52.1 37.0 - 54.0 fl    MPV 6.4 6.0 - 12.0 fL    Platelets 38 (C) 140 - 450 10*3/mm3   Scan Slide    Collection Time: 12/20/21  4:54 AM    Specimen: Blood   Result Value Ref Range    Scan Slide     Manual Differential    Collection Time: 12/20/21  4:54 AM    Specimen: Blood   Result Value Ref Range    Neutrophil % 26.0 (L) 42.7 - 76.0 %    Lymphocyte % 12.0 (L) 19.6 - 45.3 %    Monocyte % 2.0 (L) 5.0 - 12.0 %    Bands %  58.0 (H) 0.0 - 5.0 %    Metamyelocyte % 2.0 (H) 0.0 - 0.0 %    Neutrophils Absolute 32.68 (H) 1.70 - 7.00 10*3/mm3    Lymphocytes Absolute 4.67 (H) 0.70 - 3.10 10*3/mm3    Monocytes Absolute 0.78 0.10 - 0.90 10*3/mm3    nRBC 8.0 (H) 0.0 - 0.2 /100 WBC    Poikilocytes Slight/1+ None Seen    [place]le Bodies Present None Seen    Toxic Granulation Mod/2+ None Seen    Vacuolated Neutrophils Slight/1+ None Seen    Platelet Morphology Normal Normal        Imaging:  XR Abdomen KUB  Narrative: DATE OF EXAM:  12/18/2021 7:23 PM     PROCEDURE:  XR ABDOMEN KUB-     INDICATIONS:  NGT placement; R19.8-Other specified symptoms and signs involving the  digestive system and abdomen; A41.9-Sepsis, unspecified organism;  R65.21-Severe sepsis with septic shock; K56.609-Unspecified intestinal  obstruction, unspecified as to partial versus complete obstruction;  K63.1-Perforation of intestine (nontraumatic)     COMPARISON:  KUB 12/18/2021 at 4:51 AM.     TECHNIQUE:   Single radiographic view of the abdomen was obtained.        FINDINGS:  Transcutaneous pacer pads partially obscure the left upper quadrant of  the abdomen and the left medial lower thorax.     Enteric tube extends into the level of the proximal gastric body.  Nonspecific but nonobstructive bowel gas pattern. Right IJ central line  extends to the level of the lower  SVC.     Previously described large quantity free intraperitoneal air in the  abdomen on today's earlier KUB is inconspicuous on the current  examination.      Impression:    1. NG tube placement to the level of the proximal gastric body.  2. The previously described free intraperitoneal air on today's earlier  KUB is inconspicuous on the current KUB study.     Electronically Signed By-Katrin Rogers MD On:12/20/2021 8:18 AM  This report was finalized on [id number] by  Katrin Rogers MD.       ASSESSMENT:  Acute respiratory failure  Perforated viscus  Septic shock   Ulcerative colitis  C diff  DIC  Liver failure     PLAN:  Vent amangement  Hemodynamic support pressorrs IV bicarbonate   Sedation off  IV antibiotics  Bronchodilator  Inhaled corticosteroids  Electrolytes/ glycemic control  DVT and GI prophylaxis.    Total Critical care time in direct medical management (   ) minutes  Silvia Sky MD. D, ABSM.     12/20/2021  09:36 EST

## 2021-12-20 NOTE — TELEPHONE ENCOUNTER
Provider: DR BRINK  Caller: [facility]  Reason for Call: [facility] CALLED TO INFORM DR BRINK THAT THE PATIENT PASSED AWAY AT 12:33 PM TODAY

## 2021-12-20 NOTE — DISCHARGE SUMMARY
PULMONARY/CRITICAL CARE DEATH SUMMARY    PATIENT NAME:     Gabriel Sandhu  :     1955  MRN:     [id number]    ADMISSION DATE:  2021      DATE/TIME OF DEATH:     21 at  1233    SMOKING STATUS:  Former Smoker;  - ; Smoked an average of 2.5 packs/day for 21 years; Smoked: Cigarettes       CAUSE OF DEATH  Septic shock secondary to catastrophic intraabdominal infection with feculent peritonitis following large spontaneous perforation of the transverse colon   Acute respiratory failure associated with surgery in addition to septic shock  Disseminated intravascular coagulation    FINAL DIAGNOSES  Septic shock secondary to catastrophic intraabdominal infection with feculent peritonitis following large spontaneous perforation of the transverse colon   Acute respiratory failure associated with surgery in addition to septic shock  Disseminated intravascular coagulation  Acute kidney injury secondary to septic shock  Acute liver injury secondary to septic shock  Lactic acidemia  Ulcerative colitis  Recent infection with C. difficile colitis  Hyperlipidemia  Tobacco abuse in remission      HOSPITAL COURSE  Mr. Sandhu was a 66-year-old gentleman with a history of ulcerative colitis and a recent history of C. difficile colitis who initially presented to the emergency department on 2021 for evaluation of syncope.  The family thought that the patient had a cardiac arrest at home and delivered CPR briefly.  The patient was transported to an outlying facility where diagnostic imaging revealed free intra-abdominal air.  The patient was also noted to be hypotensive.  He received broad-spectrum empiric antibiotics in addition to IV fluid resuscitation.  He was transferred to [facility] for higher level acuity and emergent availability of general surgery.  The patient's wife reported that the patient had began having abdominal pain and increased liquid stool 2 days prior to presentation.  She also  reported that he had had recent, significant, unintentional weight loss.  General surgery evaluated in the emergency department where the patient continued to be hypotensive, tachycardic, acidotic, and septic.  The patient was deemed to be high high risk surgical candidate however after conversation with the patient and his wife it was decided that surgery was his best option for possible survival.  On 2021 the patient underwent an exploratory laparotomy with right hemicolectomy and end ileostomy.  The surgery revealed perforated transverse colon and ischemia of the transverse colon with feculent peritonitis.  Postoperatively the patient was admitted to the ICU on mechanical ventilation.  He remained hemodynamically unstable requiring maximum vasopressor support.  Later that day the patient did awaken briefly and was responsive although remained critically ill.  He developed multiorgan failure with shock liver and acute kidney injury.  He also developed DIC as a result of septic shock.  Despite intense resuscitative measures the patient became unresponsive with pupils fixed and dilated.  He developed asynchrony/agonal respirations with the ventilator.  The patient's family opted for DNR at that time.  The patient failed to make any significant recovery and indeed had a very grim prognosis and likely moribund.  Ultimately the patient's family decided to make the patient comfort care.  Appropriate medications were administered and all pressor support was stopped and mechanical ventilation support discontinued.  The patient  quickly with his family at bedside      PROCEDURES PERFORMED  Procedure(s):  LAPAROTOMY EXPLORATORY, EXTENDED RIGHT HEMICOLECTOMY AND END ILEOSTOMY       CONSULTING PHYSICIANS  Consults       Date and Time Order Name Status Description    2021  9:02 AM Hematology & Oncology Inpatient Consult Completed     2021  3:20 PM Inpatient Gastroenterology Consult Completed              RESULTS REVIEW  LABS  Lab Results (last 24 hours)       Procedure Component Value Units Date/Time    Lactic Acid, Plasma [[id number]]  (Abnormal) Collected: 12/19/21 1739    Specimen: Blood Updated: 12/19/21 1836     Lactate 20.1 mmol/L     Troponin [[id number]]  (Abnormal) Collected: 12/19/21 1739    Specimen: Blood Updated: 12/19/21 1836     Troponin T 0.337 ng/mL     Narrative:      Troponin T Reference Range:  <= 0.03 ng/mL-   Negative for AMI  >0.03 ng/mL-     Abnormal for myocardial necrosis.  Clinicians would have to utilize clinical acumen, EKG, Troponin and serial changes to determine if it is an Acute Myocardial Infarction or myocardial injury due to an underlying chronic condition.       Results may be falsely decreased if patient taking Biotin.      Fibrinogen [[id number]]  (Abnormal) Collected: 12/19/21 1739    Specimen: Blood Updated: 12/19/21 1805     Fibrinogen 96 mg/dL     POC Glucose Once [[id number]]  (Abnormal) Collected: 12/19/21 1742    Specimen: Blood Updated: 12/19/21 1743     Glucose 109 mg/dL      Comment: Serial Number: [id number]:  [id number]       Lactic Acid, Plasma [[id number]]  (Abnormal) Collected: 12/19/21 2313    Specimen: Blood Updated: 12/19/21 2353     Lactate 19.5 mmol/L     Troponin [[id number]]  (Abnormal) Collected: 12/19/21 2313    Specimen: Blood Updated: 12/19/21 2341     Troponin T 0.318 ng/mL     Narrative:      Troponin T Reference Range:  <= 0.03 ng/mL-   Negative for AMI  >0.03 ng/mL-     Abnormal for myocardial necrosis.  Clinicians would have to utilize clinical acumen, EKG, Troponin and serial changes to determine if it is an Acute Myocardial Infarction or myocardial injury due to an underlying chronic condition.       Results may be falsely decreased if patient taking Biotin.      CBC & Differential [[id number]]  (Abnormal) Collected: 12/19/21 2313    Specimen: Blood Updated: 12/20/21 0001    Narrative:      The following orders were created for panel order  CBC & Differential.  Procedure                               Abnormality         Status                     ---------                               -----------         ------                     CBC Auto Differential[[id number]]        Abnormal            Final result               Scan Slide[[id number]]                                       Final result                 Please view results for these tests on the individual orders.    CBC Auto Differential [[id number]]  (Abnormal) Collected: 12/19/21 2313    Specimen: Blood Updated: 12/20/21 0001     WBC 38.30 10*3/mm3      RBC 2.69 10*6/mm3      Hemoglobin 7.7 g/dL      Comment: Result checked         Hematocrit 23.9 %      MCV 89.0 fL      MCH 28.6 pg      MCHC 32.1 g/dL      RDW 16.5 %      RDW-SD 52.1 fl      MPV 9.3 fL      Platelets 23 10*3/mm3     Narrative:      The previously reported component NRBC is no longer being reported. Previous result was 3.7 /100 WBC (Reference Range: 0.0-0.2 /100 WBC) on 12/19/2021 at 2335 EST.    Scan Slide [[id number]] Collected: 12/19/21 2313    Specimen: Blood Updated: 12/20/21 0001     Scan Slide --     Comment: See Manual Differential Results       Manual Differential [[id number]]  (Abnormal) Collected: 12/19/21 2313    Specimen: Blood Updated: 12/20/21 0001     Neutrophil % 44.0 %      Lymphocyte % 7.0 %      Bands %  43.0 %      Metamyelocyte % 3.0 %      Myelocyte % 3.0 %      Neutrophils Absolute 33.32 10*3/mm3      Lymphocytes Absolute 2.68 10*3/mm3      nRBC 10.0 /100 WBC      Anisocytosis Slight/1+     Paton Cells Slight/1+     Toxic Granulation Mod/2+     Vacuolated Neutrophils Slight/1+     Platelet Estimate Decreased    POC Glucose Once [[id number]]  (Abnormal) Collected: 12/20/21 0001    Specimen: Blood Updated: 12/20/21 0003     Glucose 132 mg/dL      Comment: Serial Number: [id number]:  [id number]       Blood Gas, Arterial - [[id number]]  (Abnormal) Collected: 12/20/21 0410    Specimen: Arterial Blood Updated:  12/20/21 0415     Site Arterial Line     Tobias's Test N/A     pH, Arterial 7.335 pH units      pCO2, Arterial 32.0 mm Hg      pO2, Arterial 140.4 mm Hg      HCO3, Arterial 17.1 mmol/L      Base Excess, Arterial -8.0 mmol/L      Comment: Serial Number: [id number]:  [id number]        O2 Saturation, Arterial 99.0 %      CO2 Content 18.1 mmol/L      Barometric Pressure for Blood Gas --     Comment: N/A        Modality Adult Vent     FIO2 100 %      Ventilator Mode ;AC     Set Tidal Volume 500     PEEP 5     Hemodilution No     Respiratory Rate 24    Comprehensive Metabolic Panel [[id number]]  (Abnormal) Collected: 12/20/21 0454    Specimen: Blood Updated: 12/20/21 0544     Glucose 138 mg/dL      BUN 37 mg/dL      Creatinine 2.30 mg/dL      Sodium 140 mmol/L      Potassium 4.8 mmol/L      Chloride 94 mmol/L      CO2 16.0 mmol/L      Calcium 4.8 mg/dL      Total Protein 2.5 g/dL      Albumin 1.70 g/dL      ALT (SGPT) 1,305 U/L      AST (SGOT) 6,065 U/L      Alkaline Phosphatase 617 U/L      Total Bilirubin 2.9 mg/dL      eGFR Non African Amer 29 mL/min/1.73      Globulin 0.8 gm/dL      A/G Ratio 2.1 g/dL      BUN/Creatinine Ratio 16.1     Anion Gap 30.0 mmol/L     Narrative:      GFR Normal >60  Chronic Kidney Disease <60  Kidney Failure <15      Lactic Acid, Plasma [[id number]]  (Abnormal) Collected: 12/20/21 0454    Specimen: Blood Updated: 12/20/21 0542     Lactate 19.9 mmol/L     Calcium, Ionized [[id number]]  (Abnormal) Collected: 12/20/21 0454    Specimen: Blood Updated: 12/20/21 0512     Ionized Calcium 0.66 mmol/L     Fibrinogen [[id number]]  (Abnormal) Collected: 12/20/21 0454    Specimen: Blood Updated: 12/20/21 0522     Fibrinogen 113 mg/dL     CBC & Differential [[id number]]  (Abnormal) Collected: 12/20/21 0454    Specimen: Blood Updated: 12/20/21 0620    Narrative:      The following orders were created for panel order CBC & Differential.  Procedure                               Abnormality         Status                      ---------                               -----------         ------                     CBC Auto Differential[[id number]]        Abnormal            Final result               Scan Slide[[id number]]                                       Final result                 Please view results for these tests on the individual orders.    CBC Auto Differential [[id number]]  (Abnormal) Collected: 12/20/21 0454    Specimen: Blood Updated: 12/20/21 0620     WBC 38.90 10*3/mm3      RBC 2.58 10*6/mm3      Hemoglobin 7.4 g/dL      Hematocrit 23.0 %      MCV 89.1 fL      MCH 28.8 pg      MCHC 32.3 g/dL      RDW 16.7 %      RDW-SD 52.1 fl      MPV 6.4 fL      Platelets 38 10*3/mm3     Narrative:      The previously reported component NRBC is no longer being reported. Previous result was 3.6 /100 WBC (Reference Range: 0.0-0.2 /100 WBC) on 12/20/2021 at 0509 EST.    Scan Slide [[id number]] Collected: 12/20/21 0454    Specimen: Blood Updated: 12/20/21 0620     Scan Slide --     Comment: See Manual Differential Results       Manual Differential [[id number]]  (Abnormal) Collected: 12/20/21 0454    Specimen: Blood Updated: 12/20/21 0620     Neutrophil % 26.0 %      Lymphocyte % 12.0 %      Monocyte % 2.0 %      Bands %  58.0 %      Metamyelocyte % 2.0 %      Neutrophils Absolute 32.68 10*3/mm3      Lymphocytes Absolute 4.67 10*3/mm3      Monocytes Absolute 0.78 10*3/mm3      nRBC 8.0 /100 WBC      Poikilocytes Slight/1+     Dohle Bodies Present     Toxic Granulation Mod/2+     Vacuolated Neutrophils Slight/1+     Platelet Morphology Normal    Narrative:      Reviewed by Pathologist within the past 30 days on 12/18/2021 .            MICRO:  All cultures reviewed and negative, or pending with no growth unless otherwise designated in chart below.  Microbiology Results Abnormal       Procedure Component Value - Date/Time    COVID PRE-OP / PRE-PROCEDURE SCREENING ORDER (NO ISOLATION) - Swab, Nasopharynx [[id number]]  (Normal) Collected:  12/18/21 0352    Lab Status: Final result Specimen: Swab from Nasopharynx Updated: 12/18/21 0439    Narrative:      The following orders were created for panel order COVID PRE-OP / PRE-PROCEDURE SCREENING ORDER (NO ISOLATION) - Swab, Nasopharynx.  Procedure                               Abnormality         Status                     ---------                               -----------         ------                     COVID-19,CEPHEID/TAMMIE,CO...[[id number]]  Normal              Final result                 Please view results for these tests on the individual orders.    COVID-19,CEPHEID/TAMMIE,COR/AYAH/PAD/YVONNE IN-HOUSE(OR EMERGENT/ADD-ON),NP SWAB IN TRANSPORT MEDIA 3-4 HR TAT, RT-PCR - Swab, Nasopharynx [[id number]]  (Normal) Collected: 12/18/21 0352    Lab Status: Final result Specimen: Swab from Nasopharynx Updated: 12/18/21 0439     COVID19 Not Detected    Narrative:      Fact sheet for providers: https://www.fda.gov/media/994756/download     Fact sheet for patients: https://www.fda.gov/media/717947/download  Fact sheet for providers: https://www.fda.gov/media/438827/download    Fact sheet for patients: https://www.fda.gov/media/574720/download    Test performed by PCR.               RADIOLOGY:  XR Chest 1 View    Result Date: 12/19/2021  1.Tubes and lines appear in satisfactory positions as above. 2.Interval development of diffuse interstitial, and right greater than left bibasilar opacities with possible small to moderate pleural effusions. Findings could represent pulmonary edema/volume overload with basilar atelectasis or other infiltrates.  Electronically Signed By-Bladimir Mcclain MD On:12/19/2021 10:21 AM This report was finalized on [id number] by  Bladimir Mcclain MD.    XR Chest 1 View    Result Date: 12/18/2021   1. Large abnormal amount of free intraperitoneal gas lying between the right lobe the liver in the right hemidiaphragm of uncertain etiology might be caused by recent surgical procedure or perforated  hollow viscus. CT the abdomen pelvis reveal the further evaluate this finding as well as clinical correlation physical examination. 2. Proximal tip of the right internal jugular central venous line catheter projects over the junction superior vena cava and right atrium. No evidence of pneumothorax or pleural effusion or mediastinal widening. No acute disease in the chest.  Electronically Signed By-Jaime Garcia MD On:12/18/2021 8:32 AM This report was finalized on [id number] by  Jaime Garcia MD.    XR Chest 1 View    Result Date: 12/18/2021   1. Large abnormal amount of free intraperitoneal gas lying between the right lobe the liver in the right hemidiaphragm of uncertain etiology might be caused by recent surgical procedure or perforated hollow viscus. CT the abdomen pelvis reveal the further evaluate this finding as well as clinical correlation physical examination. 2. Proximal tip of the right internal jugular central venous line catheter projects over the junction superior vena cava and right atrium. No evidence of pneumothorax or pleural effusion or mediastinal widening. No acute disease in the chest.  Electronically Signed By-Jaime Garcia MD On:12/18/2021 8:32 AM This report was finalized on [id number] by  Jaime Garcia MD.    XR Abdomen KUB    Result Date: 12/20/2021   1. NG tube placement to the level of the proximal gastric body. 2. The previously described free intraperitoneal air on today's earlier KUB is inconspicuous on the current KUB study.  Electronically Signed By-Katrin Rogers MD On:12/20/2021 8:18 AM This report was finalized on [id number] by  Katrin Rogers MD.    XR Abdomen KUB    Result Date: 12/18/2021   1. Large amount of free intraperitoneal gas in the right upper quadrant the abdomen consistent with abdominal surgery today. 2. Nasogastric tube courses down the esophagus and stomach below diaphragm.  Electronically Signed By-Jaime Garcia MD  On:12/18/2021 8:35 AM This report was finalized on [id number] by  Jaime Garcia MD.      For more specific information regarding this patient’s hospital stay at [facility], please refer to patient’s full medical record.    Electronically signed by KULDEEP Powell, 12/20/21 at 13:17 EST.

## 2021-12-20 NOTE — SIGNIFICANT NOTE
Notified Dr. De La Torre of patient death at 1233; spoke with answering service.   12/20/21 9746   Consult Physician Information   Does the patient have physician consults? Yes   Consult Physicians Notified by Balbina ARMSTRONG   Consult Physician #1 Ashish De La Torre

## 2021-12-20 NOTE — PROGRESS NOTES
Hematology/Oncology Inpatient Progress Note    PATIENT NAME: Gabriel Sandhu  : 1955  MRN: [id number]    Chief complaint: Perforated viscus     History of present illness:    Gabriel Sandhu is a 66 y.o. male who presented to [facility] on 2021 with sepsis, intra-abdominal air needing emergent surgical intervention.     Patient has history of ulcerative colitis, recent history of C. difficile colitis(discharged on 2021_. Patient presented on 2021 with findings of peritonitis.  X-ray abdomen with air under the diaphragm.     2021 -exploratory laparotomy, extended right hemicolectomy and end ileostomy. Patient remained on ventilator with maximum pressor support.     21  Hematology/Oncology was consulted with possibility of DIC. CBC with WBC 21.9, hemoglobin 5.5, hematocrit 17.8, platelet 24. This was postop. D-dimer 10.28, thrombin time 35.7, APTT 109.6, fibrinogen 70    2021 - maxed on 5 pressors, received 10 units cryo, 2 Uplatelets,2 U pRBC since yesterday CBC this morning with Hb 7.8, Plt 38, fibrinogen 113     He/She  has a past medical history of Annual physical exam, Annual visit for general adult medical examination with abnormal findings, C. difficile diarrhea (2019), Chronic ulcerative proctitis without complications (HCC), Colon cancer screening, ED (erectile dysfunction), Generalized osteoarthritis, History of chickenpox, History of tobacco use, Hyperlipidemia, Onychomycosis, Overweight (BMI 25.0-29.9), Prostatitis, acute, Screening PSA (prostate specific antigen), and UC (ulcerative colitis) (Formerly [facility]).     PCP: Jose D Antunez MD       Subjective   Continues to be intubated, maxed on 5 pressors,     ROS:  Review of Systems   Unable to perform ROS: Intubated        MEDICATIONS:    Scheduled Meds:  micafungin (MYCAMINE) IV, 150 mg, Intravenous, Q24H  pantoprazole, 40 mg, Intravenous, Q AM  piperacillin-tazobactam, 4.5 g, Intravenous, Q8H      "  Continuous Infusions:  custom IV infusion builder, , Last Rate: 250 mL/hr at 12/20/21 0836  dextrose 5 % and sodium chloride 0.9 %, 150 mL/hr, Last Rate: 150 mL/hr (12/20/21 1033)  DOPamine, 2-20 mcg/kg/min, Last Rate: 20 mcg/kg/min (12/20/21 0803)  EPINEPHrine, 0.02-0.5 mcg/kg/min, Last Rate: 0.5 mcg/kg/min (12/20/21 0832)  midazolam, 1 mg/hr, Last Rate: Stopped (12/20/21 0717)  norepinephrine, 0.02-0.7 mcg/kg/min, Last Rate: 0.7 mcg/kg/min (12/20/21 1033)  phenylephrine, 0.5-6 mcg/kg/min, Last Rate: 6 mcg/kg/min (12/20/21 0726)  vasopressin, 0.03 Units/min, Last Rate: 0.03 Units/min (12/20/21 0830)       PRN Meds:  •  acetaminophen **OR** acetaminophen  •  artificial tears  •  HYDROmorphone  •  Morphine **AND** naloxone  •  ondansetron **OR** ondansetron  •  promethazine **OR** promethazine     ALLERGIES:  No Known Allergies    Objective    VITALS:   BP (!) 84/35   Pulse (!) 130   Temp 100.2 °F (37.9 °C) (Axillary)   Resp 24   Ht 172.7 cm (68\")   Wt 59 kg (130 lb)   SpO2 91%   BMI 19.77 kg/m²     PHYSICAL EXAM: (performed by MD)  Physical Exam  Constitutional:       Comments: Intubated, ventilator  Generalized anasarca   Abdominal:      Palpations: Abdomen is soft.   Musculoskeletal:      Right lower leg: Edema present.      Left lower leg: Edema present.   Skin:     Comments: Acral cyanosis           RECENT LABS:  Lab Results (last 24 hours)     Procedure Component Value Units Date/Time    Pathology Consultation [[id number]] Collected: 12/19/21 0942    Specimen: Blood, Venous Line Updated: 12/20/21 1002     Final Diagnosis --     Leukocytosis with left shifted granulocytes  Anemia  Thrombocytopenia  No blasts identified       Case Report --     Surgical Pathology Report                         Case: [id number]                                  Authorizing Provider:  Leila Ackerman APRN            Collected:           12/19/2021 09:42 AM          Ordering Location:     [facility]       Received:       "      12/20/2021 07:53 AM                                 INTENSIVE [facility] UN[facility]                                                          Pathologist:           German Sotelo MD                                                            Specimen:    Blood, Venous Line                                                                         Body Fluid Culture - Peritoneal Fluid, Peritoneum [[id number]]  (Abnormal) Collected: 12/18/21 0614    Specimen: Peritoneal Fluid from Peritoneum Updated: 12/20/21 0924     Body Fluid Culture Heavy growth (4+) Mixed Gram Negative Charissa     Gram Stain Many (4+) Gram negative bacilli      Many (4+) WBCs per low power field      Many (4+) Gram positive cocci      Rare (1+) Gram positive bacilli    Tissue Pathology Exam [[id number]] Collected: 12/18/21 0657    Specimen: Tissue from Large Intestine, Right / Ascending Colon; Tissue from Small Intestine, Ileum Updated: 12/20/21 0838    Manual Differential [[id number]]  (Abnormal) Collected: 12/20/21 0454    Specimen: Blood Updated: 12/20/21 0620     Neutrophil % 26.0 %      Lymphocyte % 12.0 %      Monocyte % 2.0 %      Bands %  58.0 %      Metamyelocyte % 2.0 %      Neutrophils Absolute 32.68 10*3/mm3      Lymphocytes Absolute 4.67 10*3/mm3      Monocytes Absolute 0.78 10*3/mm3      nRBC 8.0 /100 WBC      Poikilocytes Slight/1+     Dohle Bodies Present     Toxic Granulation Mod/2+     Vacuolated Neutrophils Slight/1+     Platelet Morphology Normal    Narrative:      Reviewed by Pathologist within the past 30 days on 12/18/2021 .    CBC & Differential [[id number]]  (Abnormal) Collected: 12/20/21 0454    Specimen: Blood Updated: 12/20/21 0620    Narrative:      The following orders were created for panel order CBC & Differential.  Procedure                               Abnormality         Status                     ---------                               -----------         ------                     CBC Auto Differential[[id number]]         Abnormal            Final result               Scan Slide[[id number]]                                       Final result                 Please view results for these tests on the individual orders.    Scan Slide [[id number]] Collected: 12/20/21 0454    Specimen: Blood Updated: 12/20/21 0620     Scan Slide --     Comment: See Manual Differential Results       CBC Auto Differential [[id number]]  (Abnormal) Collected: 12/20/21 0454    Specimen: Blood Updated: 12/20/21 0620     WBC 38.90 10*3/mm3      RBC 2.58 10*6/mm3      Hemoglobin 7.4 g/dL      Hematocrit 23.0 %      MCV 89.1 fL      MCH 28.8 pg      MCHC 32.3 g/dL      RDW 16.7 %      RDW-SD 52.1 fl      MPV 6.4 fL      Platelets 38 10*3/mm3     Narrative:      The previously reported component NRBC is no longer being reported. Previous result was 3.6 /100 WBC (Reference Range: 0.0-0.2 /100 WBC) on 12/20/2021 at 0509 EST.    Comprehensive Metabolic Panel [[id number]]  (Abnormal) Collected: 12/20/21 0454    Specimen: Blood Updated: 12/20/21 0544     Glucose 138 mg/dL      BUN 37 mg/dL      Creatinine 2.30 mg/dL      Sodium 140 mmol/L      Potassium 4.8 mmol/L      Chloride 94 mmol/L      CO2 16.0 mmol/L      Calcium 4.8 mg/dL      Total Protein 2.5 g/dL      Albumin 1.70 g/dL      ALT (SGPT) 1,305 U/L      AST (SGOT) 6,065 U/L      Alkaline Phosphatase 617 U/L      Total Bilirubin 2.9 mg/dL      eGFR Non African Amer 29 mL/min/1.73      Globulin 0.8 gm/dL      A/G Ratio 2.1 g/dL      BUN/Creatinine Ratio 16.1     Anion Gap 30.0 mmol/L     Narrative:      GFR Normal >60  Chronic Kidney Disease <60  Kidney Failure <15      Lactic Acid, Plasma [[id number]]  (Abnormal) Collected: 12/20/21 0454    Specimen: Blood Updated: 12/20/21 0542     Lactate 19.9 mmol/L     Fibrinogen [[id number]]  (Abnormal) Collected: 12/20/21 0454    Specimen: Blood Updated: 12/20/21 0522     Fibrinogen 113 mg/dL     Calcium, Ionized [[id number]]  (Abnormal) Collected: 12/20/21 0454    Specimen: Blood  Updated: 12/20/21 0512     Ionized Calcium 0.66 mmol/L     Blood Gas, Arterial - [[id number]]  (Abnormal) Collected: 12/20/21 0410    Specimen: Arterial Blood Updated: 12/20/21 0415     Site Arterial Line     Tobias's Test N/A     pH, Arterial 7.335 pH units      pCO2, Arterial 32.0 mm Hg      pO2, Arterial 140.4 mm Hg      HCO3, Arterial 17.1 mmol/L      Base Excess, Arterial -8.0 mmol/L      Comment: Serial Number: [id number]:  [id number]        O2 Saturation, Arterial 99.0 %      CO2 Content 18.1 mmol/L      Barometric Pressure for Blood Gas --     Comment: N/A        Modality Adult Vent     FIO2 100 %      Ventilator Mode ;AC     Set Tidal Volume 500     PEEP 5     Hemodilution No     Respiratory Rate 24    POC Glucose Once [[id number]]  (Abnormal) Collected: 12/20/21 0001    Specimen: Blood Updated: 12/20/21 0003     Glucose 132 mg/dL      Comment: Serial Number: [id number]:  [id number]       Manual Differential [[id number]]  (Abnormal) Collected: 12/19/21 2313    Specimen: Blood Updated: 12/20/21 0001     Neutrophil % 44.0 %      Lymphocyte % 7.0 %      Bands %  43.0 %      Metamyelocyte % 3.0 %      Myelocyte % 3.0 %      Neutrophils Absolute 33.32 10*3/mm3      Lymphocytes Absolute 2.68 10*3/mm3      nRBC 10.0 /100 WBC      Anisocytosis Slight/1+     Diana Cells Slight/1+     Toxic Granulation Mod/2+     Vacuolated Neutrophils Slight/1+     Platelet Estimate Decreased    CBC & Differential [[id number]]  (Abnormal) Collected: 12/19/21 2313    Specimen: Blood Updated: 12/20/21 0001    Narrative:      The following orders were created for panel order CBC & Differential.  Procedure                               Abnormality         Status                     ---------                               -----------         ------                     CBC Auto Differential[[id number]]        Abnormal            Final result               Scan Slide[[id number]]                                       Final result                  Please view results for these tests on the individual orders.    Scan Slide [[id number]] Collected: 12/19/21 2313    Specimen: Blood Updated: 12/20/21 0001     Scan Slide --     Comment: See Manual Differential Results       CBC Auto Differential [[id number]]  (Abnormal) Collected: 12/19/21 2313    Specimen: Blood Updated: 12/20/21 0001     WBC 38.30 10*3/mm3      RBC 2.69 10*6/mm3      Hemoglobin 7.7 g/dL      Comment: Result checked         Hematocrit 23.9 %      MCV 89.0 fL      MCH 28.6 pg      MCHC 32.1 g/dL      RDW 16.5 %      RDW-SD 52.1 fl      MPV 9.3 fL      Platelets 23 10*3/mm3     Narrative:      The previously reported component NRBC is no longer being reported. Previous result was 3.7 /100 WBC (Reference Range: 0.0-0.2 /100 WBC) on 12/19/2021 at 2335 EST.    Lactic Acid, Plasma [[id number]]  (Abnormal) Collected: 12/19/21 2313    Specimen: Blood Updated: 12/19/21 2353     Lactate 19.5 mmol/L     Troponin [[id number]]  (Abnormal) Collected: 12/19/21 2313    Specimen: Blood Updated: 12/19/21 2341     Troponin T 0.318 ng/mL     Narrative:      Troponin T Reference Range:  <= 0.03 ng/mL-   Negative for AMI  >0.03 ng/mL-     Abnormal for myocardial necrosis.  Clinicians would have to utilize clinical acumen, EKG, Troponin and serial changes to determine if it is an Acute Myocardial Infarction or myocardial injury due to an underlying chronic condition.       Results may be falsely decreased if patient taking Biotin.      Lactic Acid, Plasma [[id number]]  (Abnormal) Collected: 12/19/21 1739    Specimen: Blood Updated: 12/19/21 1836     Lactate 20.1 mmol/L     Troponin [[id number]]  (Abnormal) Collected: 12/19/21 1739    Specimen: Blood Updated: 12/19/21 1836     Troponin T 0.337 ng/mL     Narrative:      Troponin T Reference Range:  <= 0.03 ng/mL-   Negative for AMI  >0.03 ng/mL-     Abnormal for myocardial necrosis.  Clinicians would have to utilize clinical acumen, EKG, Troponin and serial changes to  determine if it is an Acute Myocardial Infarction or myocardial injury due to an underlying chronic condition.       Results may be falsely decreased if patient taking Biotin.      Fibrinogen [[id number]]  (Abnormal) Collected: 12/19/21 1739    Specimen: Blood Updated: 12/19/21 1805     Fibrinogen 96 mg/dL     POC Glucose Once [[id number]]  (Abnormal) Collected: 12/19/21 1742    Specimen: Blood Updated: 12/19/21 1743     Glucose 109 mg/dL      Comment: Serial Number: [id number]:  [id number]       POC Glucose Once [[id number]]  (Normal) Collected: 12/19/21 1304    Specimen: Blood Updated: 12/19/21 1742     Glucose 77 mg/dL      Comment: Serial Number: [id number]:  [id number]       Lactic Acid, Plasma [[id number]]  (Abnormal) Collected: 12/19/21 1302    Specimen: Blood Updated: 12/19/21 1348     Lactate 20.7 mmol/L     Troponin [[id number]]  (Abnormal) Collected: 12/19/21 1302    Specimen: Blood Updated: 12/19/21 1334     Troponin T 0.322 ng/mL     Narrative:      Troponin T Reference Range:  <= 0.03 ng/mL-   Negative for AMI  >0.03 ng/mL-     Abnormal for myocardial necrosis.  Clinicians would have to utilize clinical acumen, EKG, Troponin and serial changes to determine if it is an Acute Myocardial Infarction or myocardial injury due to an underlying chronic condition.       Results may be falsely decreased if patient taking Biotin.            IMAGING REVIEWED:  XR Chest 1 View    Result Date: 12/19/2021  1.Tubes and lines appear in satisfactory positions as above. 2.Interval development of diffuse interstitial, and right greater than left bibasilar opacities with possible small to moderate pleural effusions. Findings could represent pulmonary edema/volume overload with basilar atelectasis or other infiltrates.  Electronically Signed By-Bladimir Mcclain MD On:12/19/2021 10:21 AM This report was finalized on [id number] by  Bladimir Mcclain MD.    XR Abdomen KUB    Result Date: 12/20/2021   1. NG tube placement  to the level of the proximal gastric body. 2. The previously described free intraperitoneal air on today's earlier KUB is inconspicuous on the current KUB study.  Electronically Signed By-Katrin Rogers MD On:12/20/2021 8:18 AM This report was finalized on [id number] by  Katrin Rogers MD.      Assessment/Plan       Patient is a 66-year-old male with ulcerative colitis, C. difficile colitis no admitted with acute abdomen, emergent exploratory laparotomy with peritonitis, colon resection unable to be extubated postop with septic shock, severe DIC     Disseminated intravascular coagulation  Patient has elevated PT PTT, elevated D-dimer low fibrinogen of 70 this goes along with DIC.  With fibrinogen being 70 I recommended getting cryoprecipitate to target above 100.  Was given 10 units overnight with fibrinogen 113 this morning.    Thrombocytopenia  With recent surgery recommend platelet goal of 50 and above  S/p 2 units overnight with plt 38 this morning, no bleeding.     Severe anemia  Due to blood loss with recent surgery.  Transfuse for target hemoglobin above 7. Currently 7.8     Septic shock  Related to peritonitis  Patient on 5 pressors   Multiorgan failure with shock liver, creatinine up to 2.3  Worsening multi organ failure with no urine output.  Poor prognosis discussed with family.  Plan for comfort oriented care only.    Hematology will sign off. Please call if needed.

## 2021-12-20 NOTE — PROGRESS NOTES
Patient has been nonresponsive now for 24 hours.  His labs continue to worsen.  I do not believe there is any chance of meaningful recovery.      I told the family I recommend withdrawal of care.  He would like some time for other family members to arrive but the wife did say that she would be willing to do this around 11:30 AM today.

## 2021-12-20 NOTE — CASE MANAGEMENT/SOCIAL WORK
Discharge Planning Assessment  [facility]     Patient Name: Gabriel Sandhu  MRN: [id number]  Today's Date: 12/20/2021    Admit Date: 12/18/2021     Discharge Needs Assessment     Row Name 12/20/21 1232       Living Environment    Lives With spouse    Name(s) of Who Lives With Patient Wife-Radha    Current Living Arrangements home/apartment/condo    Primary Care Provided by self    Provides Primary Care For no one    Family Caregiver if Needed spouse    Quality of Family Relationships helpful; involved; supportive    Able to Return to Prior Arrangements yes       Resource/Environmental Concerns    Resource/Environmental Concerns none    Transportation Concerns car, none       Transition Planning    Patient/Family Anticipates Transition to home with family    Patient/Family Anticipated Services at Transition none    Transportation Anticipated family or friend will provide; health plan transportation       Discharge Needs Assessment    Readmission Within the Last 30 Days other (see comments)  hospitalized 11/25-11/28 d/t hyponatremia, diarrhea    Equipment Currently Used at Home none    Concerns to be Addressed no discharge needs identified    Anticipated Changes Related to Illness none    Equipment Needed After Discharge none    Provided Post Acute Provider List? N/A    Provided Post Acute Provider Quality & Resource List? N/A               Discharge Plan     Row Name 12/20/21 1248       Plan    Plan DC plan: comfort measures.    Plan Comments CM assessment by chart review. Pt is comfort measures.               Demographic Summary     Row Name 12/20/21 1231       General Information    Admission Type inpatient    Arrived From emergency department    Referral Source admission list    Reason for Consult discharge planning; care coordination/care conference    Preferred Language English     Used During This Interaction no       Contact Information    Permission Granted to Share Info With                 Functional Status     Row Name 12/20/21 1231       Functional Status    Usual Activity Tolerance fair    Current Activity Tolerance poor       Functional Status, IADL    Medications assistive person    Meal Preparation assistive person    Housekeeping assistive person    Laundry assistive person    Shopping assistive person              Phone communication or documentation only - no physical contact with patient or family.      Megan Naegele, RN      Office Phone: [phone]  Office Cell: [phone]

## 2021-12-21 LAB
BH BB BLOOD EXPIRATION DATE: NORMAL
BH BB BLOOD TYPE BARCODE: 5100
BH BB BLOOD TYPE BARCODE: 6200
BH BB BLOOD TYPE BARCODE: 6200
BH BB DISPENSE STATUS: NORMAL
BH BB PRODUCT CODE: NORMAL
BH BB UNIT NUMBER: NORMAL
CROSSMATCH INTERPRETATION: NORMAL
CROSSMATCH INTERPRETATION: NORMAL
LAB AP CASE REPORT: NORMAL
PATH REPORT.FINAL DX SPEC: NORMAL
PATH REPORT.GROSS SPEC: NORMAL
UNIT  ABO: NORMAL
UNIT  RH: NORMAL

## 2021-12-21 NOTE — CASE MANAGEMENT/SOCIAL WORK
Case Management Discharge Note           Provided Post Acute Provider List?: N/A  Provided Post Acute Provider Quality & Resource List?: N/A    Selected Continued Care - Discharged on 2021 Admission date: 2021 - Discharge disposition:                                Final Discharge Disposition Code: 41 -  in medical facility

## 2021-12-22 LAB
AT III ACT/NOR PPP CHRO: <18 % (ref 75–135)
AT III AG ACT/NOR PPP IA: <16 % (ref 72–124)
BACTERIA SPEC ANAEROBE CULT: NORMAL
FSP PPP-MCNC: 10 UG/ML
THROMBIN TIME: 49.3 SEC (ref 0–23)

## 2021-12-23 LAB — PF4 HEPARIN CMPLX IGG SERPL IA: 0.09 OD (ref 0–0.4)

## 2022-09-11 NOTE — PLAN OF CARE
Problem: Adult Inpatient Plan of Care  Goal: Plan of Care Review  Outcome: Ongoing, Progressing  Flowsheets (Taken 11/8/2021 1407)  Plan of Care Reviewed With: patient   Goal Outcome Evaluation:  Plan of Care Reviewed With: patient         Patient was admitted from the ER today with C-diff colitis. Stool sample was sent to lab and contact spore precaution was initiated. Blood Cx are pending. Patient is on room air. Will continue to observe.       Pre-Hospital Care Report (PCR)

## 2025-07-11 NOTE — PROGRESS NOTES
"Subjective:   He still maxed out on 4 pressors, but blood pressure has improved.  Large ANTONIO drain output.  Objective:      /52   Pulse (!) 129   Temp 100.1 °F (37.8 °C)   Resp 28   Ht 172.7 cm (68\")   Wt 59 kg (130 lb)   SpO2 90%   BMI 19.77 kg/m²   Intake/Output                             12/17/21 0701 - 12/18/21 0700 (Not Admitted) 12/18/21 0701 - 12/19/21 0700 12/19/21 0701 - 12/20/21 0700     [id number] Total [id number] 1901-0700 Total [id number] 1901-0700 Total                    Intake    I.V.  --  1625 1625  1800  56322 16290  --  -- --    Blood  --  -- --  --  300 300  --  -- --    Volume (Transfuse RBC Infuse Each Unit Over: 3.5H) -- -- -- -- 300 300 -- -- --    IV Piggyback  --  2000 2000  100  50 150  --  -- --    Total Intake -- 3625 3625 1900 10132 52722 -- -- --       Output    Urine  --  850 850  1725  250 1975  --  -- --    Emesis/NG output  --  -- --  450  400 850  --  -- --    Drains  --  150 150  1445  1390 2835  170  -- 170    Stool  --  -- --  50  -- 50  --  -- --    Blood  --  -- --  350  -- 350  --  -- --    Total Output -- 1000 1000 4020 2040 6060 170 -- 170          General:  On ventilator, agonal breathing, nonresponsive   Abdomen:  Nondistended, colostomy viable, ANTONIO drain serosanguineous   Incision:   no erythema, no hernia, no seroma, no swelling, skin is open fashion well approximated tissue clean tissue viable   Heart: Regular rate tachycardic   Lungs: Clear to auscultation bilaterally     Labs reviewed        Assessment:   66-year-old gentleman with a history of ulcerative colitis and recent C. difficile colitis who presented on 12/18/2021 with sepsis and colon perforation.  -Exploratory laparotomy on 12/15/2021 with extended right hemicolectomy and resection of splenic flexure due to large perforation of the transverse colon with feculent peritonitis.  Colostomy performed.  -Patient in severe sepsis and shock liver and becoming coagulopathic     Plan:   Discussed " PROGRESS NOTE      HISTORY OF PRESENT ILLNESS:       The patient presents for a follow-up of diabetes and      Diabetes-He reports that his blood sugar levels have been well-controlled, although he does not monitor them frequently. He has lost weight through diet and exercise. He is currently taking metformin without any side effects such as diarrhea or muscle pain .    He experienced sharp pain due to a dislocated rib, which was identified by his chiropractor and required several sessions to alleviate the discomfort. X-rays did not show anything. He is not sure how he dislocated it. He is not experiencing any numbness or tingling in his legs or feet. He continues to take gabapentin for arthritis pain, with no changes in dosage or side effects.    He has established care with a rheumatologist and has an upcoming appointment in January 2026. He is currently on hydroxychloroquine and has had an eye exam on Tuesday.    He is scheduled for a dental procedure at the beginning of next month and will need a refill of amoxicillin. He has another dental procedure scheduled in 6 months.    HTN-He has had issues with blood pressure in the past, but now with the heart medication carvedilol, his blood pressure has reduced. He has enough carvedilol for this month. He is on carvedilol 6.25 mg bid  and Dyazide .     He experiences motion sickness and nausea when exposed to the smell of marijuana, whether it is burnt or not. This has been going on for probably 6 years.  He is wondering what he can do about it .         Vitals:    07/11/25 1330   BP: 112/78   Pulse: 69        General: Alert.    Respiratory: Normal respiratory effort.    Musculoskeletal: Gait: normal.  Psychiatric: Mood is normal and affect is normal.    Labs:  Lab Services on 07/09/2025   Component Date Value Ref Range Status    Hemoglobin A1C 07/09/2025 6.3 (H)  4.5 - 5.6 % Final    Sodium 07/09/2025 139  135 - 145 mmol/L Final    Potassium 07/09/2025 4.0  3.4 - 5.1  mmol/L Final    Chloride 07/09/2025 107  97 - 110 mmol/L Final    Carbon Dioxide 07/09/2025 24  21 - 32 mmol/L Final    Anion Gap 07/09/2025 12  7 - 19 mmol/L Final    Glucose 07/09/2025 113 (H)  70 - 99 mg/dL Final    BUN 07/09/2025 16  6 - 20 mg/dL Final    Creatinine 07/09/2025 0.68  0.67 - 1.17 mg/dL Final    Glomerular Filtration Rate 07/09/2025 >90  >=60 Final    BUN/Cr 07/09/2025 24  7 - 25 Final    Calcium 07/09/2025 9.5  8.4 - 10.2 mg/dL Final    Bilirubin, Total 07/09/2025 0.9  0.2 - 1.0 mg/dL Final    GOT/AST 07/09/2025 20  <=37 Units/L Final    GPT/ALT 07/09/2025 32  <64 Units/L Final    Alkaline Phosphatase 07/09/2025 69  45 - 117 Units/L Final    Albumin 07/09/2025 4.4  3.4 - 5.0 g/dL Final    Protein, Total 07/09/2025 7.1  6.4 - 8.2 g/dL Final    Globulin 07/09/2025 2.7  2.0 - 4.0 g/dL Final    A/G Ratio 07/09/2025 1.6  1.0 - 2.4 Final    AMPHETAMINE, LCMS 07/09/2025 Negative  Cutoff: 50 ng/mL Final    METHAMPHETAMINE, LCMS 07/09/2025 Negative  Cutoff: 50 ng/mL Final    MDA, LCMS 07/09/2025 Negative  Cutoff: 50 ng/mL Final    MDMA, LCMS 07/09/2025 Negative  Cutoff: 50 ng/mL Final    METHYLPHENIDATE, LCMS 07/09/2025 Detected (A)  Cutoff: 20 ng/mL Final    METHYLPHENIDATE, LCMS, VALUE 07/09/2025 704  ng/mL Final    PHENTERMINE, LCMS 07/09/2025 Negative  Cutoff: 50 ng/mL Final    ALPHA-PVP, LCMS 07/09/2025 Negative  Cutoff: 10 ng/mL Final    GABAPENTIN, LCMS 07/09/2025 Detected (A)  Cutoff: 1000 ng/mL Final    GABAPENTIN, LCMS, VALUE 07/09/2025 >10,000  ng/mL Final    PREGABALIN, MS 07/09/2025 Negative  Cutoff: 1000 ng/mL Final    AMITRIPTYLINE, MS 07/09/2025 Negative  Cutoff: 40 ng/mL Final    NORTRIPTYLINE, MS 07/09/2025 Negative  Cutoff: 40 ng/mL Final    BUPROPION, MS 07/09/2025 Negative  Cutoff: 40 ng/mL Final    CITALOPRAM, MS 07/09/2025 Negative  Cutoff: 40 ng/mL Final    DESMETHYLDOXEPIN, MS 07/09/2025 Negative  Cutoff: 40 ng/mL Final    DULOXETINE, MS 07/09/2025 Negative  Cutoff:  with intensivist nurse practitioner about transfusions and consulting hematology for further recommendations regarding hematological abnormalities related to sepsis    Spoke with family that he is in very critical condition, very poor prognosis.  They are now considering comfort care.     40 ng/mL Final    FLUOXETINE, [facility] 07/09/2025 Negative  Cutoff: 40 ng/mL Final    MIRTAZAPINE, [facility] 07/09/2025 Negative  Cutoff: 20 ng/mL Final    PAROXETINE, [facility] 07/09/2025 Negative  Cutoff: 40 ng/mL Final    SERTRALINE, [facility] 07/09/2025 Negative  Cutoff: 40 ng/mL Final    VENLAFAXINE, [facility] 07/09/2025 Negative  Cutoff: 40 ng/mL Final    ALPRAZOLAM, [facility] 07/09/2025 Negative  Cutoff: 40 ng/mL Final    ALPHA-HYDROXY ALPRAZOLAM, [facility] 07/09/2025 Negative  Cutoff: 40 ng/mL Final    7-AMINO CLONAZEPAM, [facility] 07/09/2025 Negative  Cutoff: 40 ng/mL Final    LORAZEPAM, [facility] 07/09/2025 Negative  Cutoff: 40 ng/mL Final    NORDIAZEPAM, [facility] 07/09/2025 Negative  Cutoff: 40 ng/mL Final    OXAZEPAM, [facility] 07/09/2025 Negative  Cutoff: 40 ng/mL Final    TEMAZEPAM, [facility] 07/09/2025 Negative  Cutoff: 40 ng/mL Final    CODEINE, [facility] 07/09/2025 Negative  Cutoff: 50 ng/mL Final    MORPHINE, [facility] 07/09/2025 Negative  Cutoff: 50 ng/mL Final    HYDROCODONE, [facility] 07/09/2025 Negative  Cutoff: 50 ng/mL Final    NORHYDROCODONE, [facility] 07/09/2025 Negative  Cutoff: 50 ng/mL Final    HYDROMORPHONE, [facility] 07/09/2025 Negative  Cutoff: 50 ng/mL Final    OXYCODONE, [facility] 07/09/2025 Negative  Cutoff: 50 ng/mL Final    NOROXYCODONE, [facility] 07/09/2025 Negative  Cutoff: 50 ng/mL Final    OXYMORPHONE, [facility] 07/09/2025 Negative  Cutoff: 50 ng/mL Final    BUPRENORPHINE, [facility] 07/09/2025 Negative  Cutoff: 10 ng/mL Final    NORBUPRENORPHINE, [facility] 07/09/2025 Negative  Cutoff: 10 ng/mL Final    FENTANYL, [facility] 07/09/2025 Negative  Cutoff: 5 ng/mL Final    NORFENTANYL, [facility] 07/09/2025 Negative  Cutoff: 10 ng/mL Final    MEPERIDINE, [facility] 07/09/2025 Negative  Cutoff: 40 ng/mL Final    NORMEPERIDINE, LCMS 07/09/2025 Negative  Cutoff: 40 ng/mL Final    METHADONE, LCMS 07/09/2025 Negative  Cutoff: 50 ng/mL Final    METHADONE METABOLITE (EDDP), LCMS 07/09/2025 Negative  Cutoff: 50 ng/mL Final    NALTREXONE, LCMS 07/09/2025 Negative  Cutoff: 10 ng/mL Final    NALOXONE, LCMS  07/09/2025 Negative  Cutoff: 20 ng/mL Final    PROPOXYPHENE, MS 07/09/2025 Negative  Cutoff: 50 ng/mL Final    TAPENTADOL, [facility] 07/09/2025 Negative  Cutoff: 40 ng/mL Final    TRAMADOL, [facility] 07/09/2025 Negative  Cutoff: 50 ng/mL Final    O-DESMETHYL-CIS-TRAMADOL, [facility] 07/09/2025 Negative  Cutoff: 50 ng/mL Final    CARISOPRODOL-SOMA, [facility] 07/09/2025 Negative  Cutoff: 50 ng/mL Final    CYCLOBENZAPRINE, [facility] 07/09/2025 Negative  Cutoff: 40 ng/mL Final    MEPROBAMATE, [facility] 07/09/2025 Negative  Cutoff: 50 ng/mL Final    ZOLPIDEM, [facility] 07/09/2025 Negative  Cutoff: 10 ng/mL Final    KETAMINE, [facility] 07/09/2025 Negative  Cutoff: 10 ng/mL Final    HEROIN METABOLITE (6MAM), [facility] 07/09/2025 Negative  Cutoff: 5 ng/mL Final    COCAINE METABOLITE (BENZOYLECGONIN* 07/09/2025 Negative  Cutoff: 40 ng/mL Final    PHENCYCLIDINE, [facility] 07/09/2025 Negative  Cutoff: 10 ng/mL Final    THC METABOLITE (THC-11-NOR-DELTA-9* 07/09/2025 Negative  Cutoff: 25 ng/mL Final    Creatinine, Urine 07/09/2025 60.1  >=20.0 mg/dL Final    pH 07/09/2025 6.5  4.5 - 9.0 Final    Oxidant 07/09/2025    Final    Barbiturates, Urine 07/09/2025 Negative  Negative Final    Ethanol, Urine 07/09/2025 Negative  Negative mg/dL Final          ASSESSMENT/PLAN:  1. Type 2 diabetes mellitus without complication, without long-term current use of insulin (CMD)    2. Screening for prostate cancer    3. High risk medication use    4. Essential hypertension            Type 2 Diabetes Mellitus without complications without long term current use of insulin  Blood glucose levels are well-controlled, averaging around 113, with an A1c of 6.3. Kidney and liver functions are within normal limits. Currently on metformin without any side effects. Potential benefits and side effects of Jardiance were discussed, but no changes will be made to his current regimen at this time. A B12 test will be ordered during his next blood work due to the potential for metformin to lower B12  levels.      High risk medication  Will order B12    Screening for prostate cancer  Future PSA ordered       HTN   Blood pressure is well-controlled with carvedilol 1 and dyazide, continue current medications.  Continue for low-salt diet and exercise    Nausea.  Experiences nausea when exposed to the smell of marijuana, ongoing for six years. Likely due to heightened sensitivity. Advised to avoid exposure to marijuana smoke. No quick fix available for this issue.      Results reviewed with the patient in the clinic today'      Orders Placed This Encounter    CBC with Automated Differential    Comprehensive Metabolic Panel    Glycohemoglobin    Lipid Panel With Reflex    ALBUMIN, RANDOM URINE WITH CREATININE    PSA    Vitamin B12    albuterol 108 (90 Base) MCG/ACT inhaler    amoxicillin (AMOXIL) 500 MG capsule         FOLLOW-UP:  Return in about 6 months (around 1/11/2026) for physical, with fasting labs 3 days prior to appointment..

## (undated) DEVICE — TOWEL,OR,DSP,ST,WHITE,DLX,4/PK,20PK/CS: Brand: MEDLINE

## (undated) DEVICE — CVR HNDL LT CAM LB54

## (undated) DEVICE — SYR LL TP 10ML STRL

## (undated) DEVICE — SUT PDS 3/0 SH 27IN DYED Z316H

## (undated) DEVICE — BLAKE SILICONE DRAIN, 19 FR ROUND, HUBLESS WITH 1/4" BENDABLE TROCAR: Brand: BLAKE

## (undated) DEVICE — COVER,MAYO STAND,STERILE: Brand: MEDLINE

## (undated) DEVICE — SUT VIC 2/0 SH 27IN

## (undated) DEVICE — DECANTER: Brand: UNBRANDED

## (undated) DEVICE — PK MAJ LAPAROTOMY 50

## (undated) DEVICE — ESWAB LQ AMIES  REG. NYLON FLOCKED  APPLICATOR: Brand: ESWAB™

## (undated) DEVICE — UNDRGLV SURG BIOGEL PIMICROINDICATOR SYNTH SZ8 LF STRL

## (undated) DEVICE — DRN WND EVAC BULB 100CC

## (undated) DEVICE — 2-PIECE OSTOMY SKIN BARRIER, FORMAFLEX: Brand: NEW IMAGE

## (undated) DEVICE — SUT SILK 3/0 SH CR8 30IN C017D

## (undated) DEVICE — SUT SILK 2/0 SH CR8 18IN CR8 C012D

## (undated) DEVICE — SUT SILK 3/0 TIES 18IN A184H

## (undated) DEVICE — SUT PDS 1 TP1 48IN Z880G BX/12

## (undated) DEVICE — SUT VIC 3/0 SH CR8 18IN J864D

## (undated) DEVICE — GLV SURG SIGNATURE ESSENTIAL PF LTX SZ7.5

## (undated) DEVICE — TUBING, SUCTION, 1/4" X 12', STRAIGHT: Brand: MEDLINE

## (undated) DEVICE — SLV SCD CALF HEMOFORCE DVT THERP REPROC MD

## (undated) DEVICE — SUT PDS 0 CT 36IN DYED Z358T

## (undated) DEVICE — SPNG LAP PREWSH SFTPK 18X18IN STRL PK/5

## (undated) DEVICE — SUT SILK 2/0 FS BLK 18IN 685G

## (undated) DEVICE — KT SURG TURNOVER 050

## (undated) DEVICE — PENCL HND ROCKRSWTCH HOLSTR EZ CLEAN TP CRD 10FT

## (undated) DEVICE — CVR HNDL LT SURG ACCSSRY BLU STRL

## (undated) DEVICE — SUT PROLN 3/0 SH 48IN BLU 8534H

## (undated) DEVICE — SOLUTION,WATER,IRRIGATION,1000ML,STERILE: Brand: MEDLINE

## (undated) DEVICE — GOWN,REINFORCE,POLY,SIRUS,BREATH SLV,XLG: Brand: MEDLINE

## (undated) DEVICE — TP SXN YANKR BULB STRL

## (undated) DEVICE — TOWEL,OR,DSP,ST,WHITE,DLX,XR,4/PK,20PK/C: Brand: MEDLINE